# Patient Record
Sex: FEMALE | Race: WHITE | NOT HISPANIC OR LATINO | Employment: UNEMPLOYED | RURAL
[De-identification: names, ages, dates, MRNs, and addresses within clinical notes are randomized per-mention and may not be internally consistent; named-entity substitution may affect disease eponyms.]

---

## 2021-05-07 ENCOUNTER — HOSPITAL ENCOUNTER (EMERGENCY)
Facility: HOSPITAL | Age: 51
Discharge: HOME | End: 2021-05-07
Payer: COMMERCIAL

## 2021-05-07 VITALS
RESPIRATION RATE: 16 BRPM | TEMPERATURE: 98 F | BODY MASS INDEX: 28.52 KG/M2 | HEART RATE: 64 BPM | OXYGEN SATURATION: 97 % | WEIGHT: 155 LBS | HEIGHT: 62 IN | SYSTOLIC BLOOD PRESSURE: 120 MMHG | DIASTOLIC BLOOD PRESSURE: 87 MMHG

## 2021-05-07 DIAGNOSIS — K58.9 IRRITABLE BOWEL SYNDROME, UNSPECIFIED TYPE: Primary | ICD-10-CM

## 2021-05-07 LAB
BILIRUB UR QL STRIP: NEGATIVE
CLARITY UR: CLEAR
COLOR UR: YELLOW
GLUCOSE UR STRIP-MCNC: NEGATIVE MG/DL
KETONES UR STRIP-SCNC: NEGATIVE MG/DL
LEUKOCYTE ESTERASE UR QL STRIP: NEGATIVE
NITRITE UR QL STRIP: NEGATIVE
PH UR STRIP: 6 PH UNITS
PROT UR QL STRIP: NEGATIVE
RBC # UR STRIP: NEGATIVE /UL
SP GR UR STRIP: 1.02
UROBILINOGEN UR STRIP-ACNC: 0.2 MG/DL

## 2021-05-07 PROCEDURE — 99284 PR EMERGENCY DEPT VISIT,LEVEL IV: ICD-10-PCS | Mod: ,,, | Performed by: NURSE PRACTITIONER

## 2021-05-07 PROCEDURE — 99284 EMERGENCY DEPT VISIT MOD MDM: CPT | Mod: ,,, | Performed by: NURSE PRACTITIONER

## 2021-05-07 PROCEDURE — 81003 URINALYSIS AUTO W/O SCOPE: CPT | Performed by: NURSE PRACTITIONER

## 2021-05-07 PROCEDURE — 99284 EMERGENCY DEPT VISIT MOD MDM: CPT | Mod: 25

## 2021-05-07 RX ORDER — DICYCLOMINE HYDROCHLORIDE 20 MG/1
20 TABLET ORAL 2 TIMES DAILY
Qty: 20 TABLET | Refills: 0 | Status: SHIPPED | OUTPATIENT
Start: 2021-05-07 | End: 2021-06-06

## 2021-05-07 RX ORDER — FLUOXETINE HYDROCHLORIDE 40 MG/1
40 CAPSULE ORAL DAILY
COMMUNITY
End: 2021-06-02 | Stop reason: SDUPTHER

## 2021-05-07 RX ORDER — ALPRAZOLAM 1 MG/1
1 TABLET ORAL 3 TIMES DAILY PRN
COMMUNITY
End: 2021-08-05 | Stop reason: SDUPTHER

## 2021-06-02 ENCOUNTER — OFFICE VISIT (OUTPATIENT)
Dept: FAMILY MEDICINE | Facility: CLINIC | Age: 51
End: 2021-06-02
Payer: COMMERCIAL

## 2021-06-02 VITALS
HEIGHT: 62 IN | HEART RATE: 69 BPM | WEIGHT: 158 LBS | SYSTOLIC BLOOD PRESSURE: 132 MMHG | TEMPERATURE: 98 F | BODY MASS INDEX: 29.08 KG/M2 | DIASTOLIC BLOOD PRESSURE: 102 MMHG

## 2021-06-02 DIAGNOSIS — K59.04 CHRONIC IDIOPATHIC CONSTIPATION: Chronic | ICD-10-CM

## 2021-06-02 DIAGNOSIS — R19.5 POSITIVE COLORECTAL CANCER SCREENING USING COLOGUARD TEST: Primary | ICD-10-CM

## 2021-06-02 DIAGNOSIS — F33.9 RECURRENT MAJOR DEPRESSIVE DISORDER, REMISSION STATUS UNSPECIFIED: Chronic | ICD-10-CM

## 2021-06-02 PROCEDURE — 99214 OFFICE O/P EST MOD 30 MIN: CPT | Mod: ,,, | Performed by: FAMILY MEDICINE

## 2021-06-02 PROCEDURE — 99214 PR OFFICE/OUTPT VISIT, EST, LEVL IV, 30-39 MIN: ICD-10-PCS | Mod: ,,, | Performed by: FAMILY MEDICINE

## 2021-06-02 RX ORDER — FLUOXETINE HYDROCHLORIDE 40 MG/1
40 CAPSULE ORAL DAILY
Qty: 90 CAPSULE | Refills: 1 | Status: SHIPPED | OUTPATIENT
Start: 2021-06-02 | End: 2022-02-17 | Stop reason: SDUPTHER

## 2021-06-07 DIAGNOSIS — Z11.59 SPECIAL SCREENING EXAMINATION FOR VIRAL DISEASE: Primary | ICD-10-CM

## 2021-06-07 DIAGNOSIS — R19.5 POSITIVE COLORECTAL CANCER SCREENING USING COLOGUARD TEST: Primary | ICD-10-CM

## 2021-06-25 ENCOUNTER — HOSPITAL ENCOUNTER (OUTPATIENT)
Dept: GASTROENTEROLOGY | Facility: HOSPITAL | Age: 51
Discharge: HOME | End: 2021-06-25
Attending: INTERNAL MEDICINE
Payer: COMMERCIAL

## 2021-06-25 ENCOUNTER — ANESTHESIA EVENT (OUTPATIENT)
Dept: GASTROENTEROLOGY | Facility: HOSPITAL | Age: 51
End: 2021-06-25
Payer: COMMERCIAL

## 2021-06-25 ENCOUNTER — ANESTHESIA (OUTPATIENT)
Dept: GASTROENTEROLOGY | Facility: HOSPITAL | Age: 51
End: 2021-06-25
Payer: COMMERCIAL

## 2021-06-25 VITALS
DIASTOLIC BLOOD PRESSURE: 86 MMHG | BODY MASS INDEX: 27.46 KG/M2 | OXYGEN SATURATION: 98 % | TEMPERATURE: 97 F | RESPIRATION RATE: 19 BRPM | WEIGHT: 155 LBS | HEART RATE: 75 BPM | HEIGHT: 63 IN | SYSTOLIC BLOOD PRESSURE: 115 MMHG

## 2021-06-25 DIAGNOSIS — K63.5 POLYP OF DESCENDING COLON, UNSPECIFIED TYPE: ICD-10-CM

## 2021-06-25 DIAGNOSIS — K63.5 POLYP OF TRANSVERSE COLON, UNSPECIFIED TYPE: ICD-10-CM

## 2021-06-25 DIAGNOSIS — R19.5 POSITIVE COLORECTAL CANCER SCREENING USING COLOGUARD TEST: ICD-10-CM

## 2021-06-25 DIAGNOSIS — K57.30 DIVERTICULA, COLON: ICD-10-CM

## 2021-06-25 DIAGNOSIS — Z83.719 FAMILY HISTORY OF COLONIC POLYPS: ICD-10-CM

## 2021-06-25 PROCEDURE — 88305 TISSUE EXAM BY PATHOLOGIST: CPT | Mod: 26,,, | Performed by: PATHOLOGY

## 2021-06-25 PROCEDURE — D9220A PRA ANESTHESIA: Mod: ,,, | Performed by: NURSE ANESTHETIST, CERTIFIED REGISTERED

## 2021-06-25 PROCEDURE — 25000003 PHARM REV CODE 250: Performed by: NURSE ANESTHETIST, CERTIFIED REGISTERED

## 2021-06-25 PROCEDURE — 63600175 PHARM REV CODE 636 W HCPCS: Performed by: NURSE ANESTHETIST, CERTIFIED REGISTERED

## 2021-06-25 PROCEDURE — C1889 IMPLANT/INSERT DEVICE, NOC: HCPCS

## 2021-06-25 PROCEDURE — 37000009 HC ANESTHESIA EA ADD 15 MINS

## 2021-06-25 PROCEDURE — 27000284 HC CANNULA NASAL: Performed by: NURSE ANESTHETIST, CERTIFIED REGISTERED

## 2021-06-25 PROCEDURE — 37000008 HC ANESTHESIA 1ST 15 MINUTES

## 2021-06-25 PROCEDURE — D9220A PRA ANESTHESIA: ICD-10-PCS | Mod: ,,, | Performed by: NURSE ANESTHETIST, CERTIFIED REGISTERED

## 2021-06-25 PROCEDURE — 27201423 OPTIME MED/SURG SUP & DEVICES STERILE SUPPLY

## 2021-06-25 PROCEDURE — 45380 COLONOSCOPY AND BIOPSY: CPT

## 2021-06-25 PROCEDURE — 88305 SURGICAL PATHOLOGY: ICD-10-PCS | Mod: 26,,, | Performed by: PATHOLOGY

## 2021-06-25 PROCEDURE — 88305 TISSUE EXAM BY PATHOLOGIST: CPT | Mod: SUR | Performed by: INTERNAL MEDICINE

## 2021-06-25 RX ORDER — LIDOCAINE HYDROCHLORIDE 20 MG/ML
INJECTION, SOLUTION EPIDURAL; INFILTRATION; INTRACAUDAL; PERINEURAL
Status: DISCONTINUED | OUTPATIENT
Start: 2021-06-25 | End: 2021-06-25

## 2021-06-25 RX ORDER — GLYCOPYRROLATE 0.2 MG/ML
INJECTION INTRAMUSCULAR; INTRAVENOUS
Status: DISCONTINUED | OUTPATIENT
Start: 2021-06-25 | End: 2021-06-25

## 2021-06-25 RX ORDER — PHENYLEPHRINE HYDROCHLORIDE 10 MG/ML
INJECTION INTRAVENOUS
Status: DISCONTINUED | OUTPATIENT
Start: 2021-06-25 | End: 2021-06-25

## 2021-06-25 RX ORDER — PROPOFOL 10 MG/ML
INJECTION, EMULSION INTRAVENOUS
Status: DISCONTINUED | OUTPATIENT
Start: 2021-06-25 | End: 2021-06-25

## 2021-06-25 RX ORDER — SODIUM CHLORIDE 0.9 % (FLUSH) 0.9 %
10 SYRINGE (ML) INJECTION
Status: DISCONTINUED | OUTPATIENT
Start: 2021-06-25 | End: 2021-06-26 | Stop reason: HOSPADM

## 2021-06-25 RX ADMIN — SODIUM CHLORIDE: 9 INJECTION, SOLUTION INTRAVENOUS at 11:06

## 2021-06-25 RX ADMIN — GLYCOPYRROLATE 0.2 MG: 0.2 INJECTION INTRAMUSCULAR; INTRAVENOUS at 12:06

## 2021-06-25 RX ADMIN — PHENYLEPHRINE HYDROCHLORIDE 100 MCG: 10 INJECTION INTRAVENOUS at 12:06

## 2021-06-25 RX ADMIN — PROPOFOL 50 MG: 10 INJECTION, EMULSION INTRAVENOUS at 11:06

## 2021-06-25 RX ADMIN — LIDOCAINE HYDROCHLORIDE 100 MG: 20 INJECTION, SOLUTION INTRAVENOUS at 11:06

## 2021-06-28 LAB
ESTROGEN SERPL-MCNC: NORMAL PG/ML
LAB AP GROSS DESCRIPTION: NORMAL
LAB AP LABORATORY NOTES: NORMAL
T3RU NFR SERPL: NORMAL %

## 2021-06-29 ENCOUNTER — TELEPHONE (OUTPATIENT)
Dept: GASTROENTEROLOGY | Facility: CLINIC | Age: 51
End: 2021-06-29

## 2021-08-05 ENCOUNTER — OFFICE VISIT (OUTPATIENT)
Dept: FAMILY MEDICINE | Facility: CLINIC | Age: 51
End: 2021-08-05
Payer: COMMERCIAL

## 2021-08-05 VITALS
BODY MASS INDEX: 27.46 KG/M2 | DIASTOLIC BLOOD PRESSURE: 74 MMHG | SYSTOLIC BLOOD PRESSURE: 97 MMHG | WEIGHT: 155 LBS | HEART RATE: 74 BPM | HEIGHT: 63 IN

## 2021-08-05 DIAGNOSIS — Z76.0 ISSUE OF REPEAT PRESCRIPTION: ICD-10-CM

## 2021-08-05 DIAGNOSIS — Z28.21 COVID-19 VIRUS VACCINATION DECLINED: ICD-10-CM

## 2021-08-05 DIAGNOSIS — F41.1 GAD (GENERALIZED ANXIETY DISORDER): Primary | Chronic | ICD-10-CM

## 2021-08-05 DIAGNOSIS — F33.41 RECURRENT MAJOR DEPRESSIVE DISORDER, IN PARTIAL REMISSION: Chronic | ICD-10-CM

## 2021-08-05 PROCEDURE — 99214 OFFICE O/P EST MOD 30 MIN: CPT | Mod: ,,, | Performed by: FAMILY MEDICINE

## 2021-08-05 PROCEDURE — 99214 PR OFFICE/OUTPT VISIT, EST, LEVL IV, 30-39 MIN: ICD-10-PCS | Mod: ,,, | Performed by: FAMILY MEDICINE

## 2021-08-05 RX ORDER — ALPRAZOLAM 1 MG/1
1 TABLET ORAL 3 TIMES DAILY PRN
Qty: 90 TABLET | Refills: 2 | Status: SHIPPED | OUTPATIENT
Start: 2021-08-05 | End: 2021-10-05

## 2021-08-18 ENCOUNTER — OFFICE VISIT (OUTPATIENT)
Dept: FAMILY MEDICINE | Facility: CLINIC | Age: 51
End: 2021-08-18
Payer: COMMERCIAL

## 2021-08-18 DIAGNOSIS — Z20.822 CLOSE EXPOSURE TO COVID-19 VIRUS: ICD-10-CM

## 2021-08-18 DIAGNOSIS — R05.9 COUGH: ICD-10-CM

## 2021-08-18 DIAGNOSIS — U07.1 COVID-19: Primary | ICD-10-CM

## 2021-08-18 DIAGNOSIS — R53.83 FATIGUE, UNSPECIFIED TYPE: ICD-10-CM

## 2021-08-18 LAB
CTP QC/QA: YES
FLUAV AG NPH QL: NEGATIVE
FLUBV AG NPH QL: NEGATIVE
SARS-COV-2 AG RESP QL IA.RAPID: POSITIVE

## 2021-08-18 PROCEDURE — 99213 PR OFFICE/OUTPT VISIT, EST, LEVL III, 20-29 MIN: ICD-10-PCS | Mod: ,,, | Performed by: FAMILY MEDICINE

## 2021-08-18 PROCEDURE — 99213 OFFICE O/P EST LOW 20 MIN: CPT | Mod: ,,, | Performed by: FAMILY MEDICINE

## 2021-08-18 PROCEDURE — 87428 POCT SARS-COV2 (COVID) WITH FLU ANTIGEN: ICD-10-PCS | Mod: QW,,, | Performed by: FAMILY MEDICINE

## 2021-08-18 PROCEDURE — 87428 SARSCOV & INF VIR A&B AG IA: CPT | Mod: QW,,, | Performed by: FAMILY MEDICINE

## 2021-08-26 ENCOUNTER — HOSPITAL ENCOUNTER (INPATIENT)
Facility: HOSPITAL | Age: 51
LOS: 7 days | Discharge: HOME | DRG: 177 | End: 2021-09-03
Attending: EMERGENCY MEDICINE | Admitting: EMERGENCY MEDICINE
Payer: COMMERCIAL

## 2021-08-26 DIAGNOSIS — U07.1 COVID-19 VIRUS INFECTION: ICD-10-CM

## 2021-08-26 DIAGNOSIS — R09.02 HYPOXIA: ICD-10-CM

## 2021-08-26 DIAGNOSIS — Z20.822 SUSPECTED COVID-19 VIRUS INFECTION: Primary | ICD-10-CM

## 2021-08-26 DIAGNOSIS — R11.2 NAUSEA AND VOMITING, INTRACTABILITY OF VOMITING NOT SPECIFIED, UNSPECIFIED VOMITING TYPE: ICD-10-CM

## 2021-08-26 LAB
ALBUMIN SERPL BCP-MCNC: 3.9 G/DL (ref 3.5–5)
ALBUMIN/GLOB SERPL: 1.1 {RATIO}
ALP SERPL-CCNC: 98 U/L (ref 41–108)
ALT SERPL W P-5'-P-CCNC: 141 U/L (ref 13–56)
ANION GAP SERPL CALCULATED.3IONS-SCNC: 12 MMOL/L (ref 7–16)
AST SERPL W P-5'-P-CCNC: 151 U/L (ref 15–37)
BACTERIA #/AREA URNS HPF: ABNORMAL /HPF
BASOPHILS # BLD AUTO: 0 K/UL (ref 0–0.2)
BASOPHILS NFR BLD AUTO: 0 % (ref 0–1)
BILIRUB SERPL-MCNC: 0.3 MG/DL (ref 0–1.2)
BILIRUB UR QL STRIP: NEGATIVE
BUN SERPL-MCNC: 12 MG/DL (ref 7–18)
BUN/CREAT SERPL: 13 (ref 6–20)
CALCIUM SERPL-MCNC: 8.4 MG/DL (ref 8.5–10.1)
CHLORIDE SERPL-SCNC: 101 MMOL/L (ref 98–107)
CLARITY UR: CLEAR
CO2 SERPL-SCNC: 27 MMOL/L (ref 21–32)
COLOR UR: YELLOW
CREAT SERPL-MCNC: 0.96 MG/DL (ref 0.55–1.02)
D DIMER PPP FEU-MCNC: 0.64 ΜG/ML (ref 0–0.47)
DIFFERENTIAL METHOD BLD: ABNORMAL
EOSINOPHIL # BLD AUTO: 0.01 K/UL (ref 0–0.5)
EOSINOPHIL NFR BLD AUTO: 0.2 % (ref 1–4)
ERYTHROCYTE [DISTWIDTH] IN BLOOD BY AUTOMATED COUNT: 14.3 % (ref 11.5–14.5)
GLOBULIN SER-MCNC: 3.6 G/DL (ref 2–4)
GLUCOSE SERPL-MCNC: 108 MG/DL (ref 74–106)
GLUCOSE UR STRIP-MCNC: NEGATIVE MG/DL
HCT VFR BLD AUTO: 46.2 % (ref 38–47)
HGB BLD-MCNC: 15.5 G/DL (ref 12–16)
KETONES UR STRIP-SCNC: ABNORMAL MG/DL
LACTATE SERPL-SCNC: 0.8 MMOL/L (ref 0.4–2)
LEUKOCYTE ESTERASE UR QL STRIP: NEGATIVE
LYMPHOCYTES # BLD AUTO: 1 K/UL (ref 1–4.8)
LYMPHOCYTES NFR BLD AUTO: 24.6 % (ref 27–41)
MCH RBC QN AUTO: 29.9 PG (ref 27–31)
MCHC RBC AUTO-ENTMCNC: 33.5 G/DL (ref 32–36)
MCV RBC AUTO: 89 FL (ref 80–96)
MONOCYTES # BLD AUTO: 0.35 K/UL (ref 0–0.8)
MONOCYTES NFR BLD AUTO: 8.6 % (ref 2–6)
MPC BLD CALC-MCNC: 10.6 FL (ref 9.4–12.4)
MUCOUS THREADS #/AREA URNS HPF: ABNORMAL /HPF
NEUTROPHILS # BLD AUTO: 2.71 K/UL (ref 1.8–7.7)
NEUTROPHILS NFR BLD AUTO: 66.6 % (ref 53–65)
NITRITE UR QL STRIP: NEGATIVE
PH UR STRIP: 5.5 PH UNITS
PLATELET # BLD AUTO: 158 K/UL (ref 150–400)
POTASSIUM SERPL-SCNC: 3.5 MMOL/L (ref 3.5–5.1)
PROT SERPL-MCNC: 7.5 G/DL (ref 6.4–8.2)
PROT UR QL STRIP: 30
RBC # BLD AUTO: 5.19 M/UL (ref 4.2–5.4)
RBC # UR STRIP: ABNORMAL /UL
RBC #/AREA URNS HPF: ABNORMAL /HPF
SODIUM SERPL-SCNC: 136 MMOL/L (ref 136–145)
SP GR UR STRIP: 1.02
SQUAMOUS #/AREA URNS LPF: ABNORMAL /LPF
TROPONIN I SERPL-MCNC: <0.017 NG/ML
UROBILINOGEN UR STRIP-ACNC: 0.2 MG/DL
WBC # BLD AUTO: 4.07 K/UL (ref 4.5–11)
WBC #/AREA URNS HPF: ABNORMAL /HPF

## 2021-08-26 PROCEDURE — 96365 THER/PROPH/DIAG IV INF INIT: CPT

## 2021-08-26 PROCEDURE — 80053 COMPREHEN METABOLIC PANEL: CPT | Performed by: NURSE PRACTITIONER

## 2021-08-26 PROCEDURE — 81001 URINALYSIS AUTO W/SCOPE: CPT | Performed by: NURSE PRACTITIONER

## 2021-08-26 PROCEDURE — 96361 HYDRATE IV INFUSION ADD-ON: CPT

## 2021-08-26 PROCEDURE — 63600175 PHARM REV CODE 636 W HCPCS: Performed by: NURSE PRACTITIONER

## 2021-08-26 PROCEDURE — 87040 BLOOD CULTURE FOR BACTERIA: CPT | Performed by: NURSE PRACTITIONER

## 2021-08-26 PROCEDURE — 36415 COLL VENOUS BLD VENIPUNCTURE: CPT | Performed by: NURSE PRACTITIONER

## 2021-08-26 PROCEDURE — 85378 FIBRIN DEGRADE SEMIQUANT: CPT | Performed by: NURSE PRACTITIONER

## 2021-08-26 PROCEDURE — 25500020 PHARM REV CODE 255: Performed by: NURSE PRACTITIONER

## 2021-08-26 PROCEDURE — 99285 EMERGENCY DEPT VISIT HI MDM: CPT | Mod: 25

## 2021-08-26 PROCEDURE — 99284 PR EMERGENCY DEPT VISIT,LEVEL IV: ICD-10-PCS | Mod: ,,, | Performed by: NURSE PRACTITIONER

## 2021-08-26 PROCEDURE — 93005 ELECTROCARDIOGRAM TRACING: CPT

## 2021-08-26 PROCEDURE — 93010 ELECTROCARDIOGRAM REPORT: CPT | Mod: ,,, | Performed by: HOSPITALIST

## 2021-08-26 PROCEDURE — 25000003 PHARM REV CODE 250: Performed by: NURSE PRACTITIONER

## 2021-08-26 PROCEDURE — 99284 EMERGENCY DEPT VISIT MOD MDM: CPT | Mod: ,,, | Performed by: NURSE PRACTITIONER

## 2021-08-26 PROCEDURE — 96375 TX/PRO/DX INJ NEW DRUG ADDON: CPT

## 2021-08-26 PROCEDURE — 85025 COMPLETE CBC W/AUTO DIFF WBC: CPT | Performed by: NURSE PRACTITIONER

## 2021-08-26 PROCEDURE — 93010 EKG 12-LEAD: ICD-10-PCS | Mod: ,,, | Performed by: HOSPITALIST

## 2021-08-26 PROCEDURE — 84484 ASSAY OF TROPONIN QUANT: CPT | Performed by: NURSE PRACTITIONER

## 2021-08-26 PROCEDURE — 83605 ASSAY OF LACTIC ACID: CPT | Performed by: NURSE PRACTITIONER

## 2021-08-26 PROCEDURE — 81003 URINALYSIS AUTO W/O SCOPE: CPT | Performed by: NURSE PRACTITIONER

## 2021-08-26 RX ORDER — DIPHENHYDRAMINE HYDROCHLORIDE 50 MG/ML
50 INJECTION INTRAMUSCULAR; INTRAVENOUS
Status: COMPLETED | OUTPATIENT
Start: 2021-08-26 | End: 2021-08-26

## 2021-08-26 RX ORDER — ONDANSETRON 2 MG/ML
4 INJECTION INTRAMUSCULAR; INTRAVENOUS
Status: COMPLETED | OUTPATIENT
Start: 2021-08-26 | End: 2021-08-26

## 2021-08-26 RX ORDER — FAMOTIDINE 10 MG/ML
20 INJECTION INTRAVENOUS
Status: COMPLETED | OUTPATIENT
Start: 2021-08-26 | End: 2021-08-26

## 2021-08-26 RX ORDER — DEXAMETHASONE SODIUM PHOSPHATE 4 MG/ML
8 INJECTION, SOLUTION INTRA-ARTICULAR; INTRALESIONAL; INTRAMUSCULAR; INTRAVENOUS; SOFT TISSUE
Status: COMPLETED | OUTPATIENT
Start: 2021-08-26 | End: 2021-08-26

## 2021-08-26 RX ADMIN — SODIUM CHLORIDE 1000 ML: 9 INJECTION, SOLUTION INTRAVENOUS at 09:08

## 2021-08-26 RX ADMIN — IOPAMIDOL 100 ML: 755 INJECTION, SOLUTION INTRAVENOUS at 11:08

## 2021-08-26 RX ADMIN — ONDANSETRON 4 MG: 2 INJECTION INTRAMUSCULAR; INTRAVENOUS at 09:08

## 2021-08-26 RX ADMIN — CEFTRIAXONE 1 G: 1 INJECTION, POWDER, FOR SOLUTION INTRAMUSCULAR; INTRAVENOUS at 10:08

## 2021-08-26 RX ADMIN — FAMOTIDINE 20 MG: 10 INJECTION, SOLUTION INTRAVENOUS at 10:08

## 2021-08-26 RX ADMIN — DEXAMETHASONE SODIUM PHOSPHATE 8 MG: 4 INJECTION, SOLUTION INTRAMUSCULAR; INTRAVENOUS at 10:08

## 2021-08-26 RX ADMIN — DIPHENHYDRAMINE HYDROCHLORIDE 50 MG: 50 INJECTION, SOLUTION INTRAMUSCULAR; INTRAVENOUS at 10:08

## 2021-08-27 PROBLEM — F17.200 CURRENT EVERY DAY SMOKER: Status: ACTIVE | Noted: 2021-08-27

## 2021-08-27 PROBLEM — F41.9 ANXIETY: Status: ACTIVE | Noted: 2021-08-27

## 2021-08-27 PROBLEM — Z79.899 DVT PROPHYLAXIS: Status: ACTIVE | Noted: 2021-08-27

## 2021-08-27 PROBLEM — U07.1 COVID-19 VIRUS INFECTION: Status: ACTIVE | Noted: 2021-08-27

## 2021-08-27 LAB
ALBUMIN SERPL BCP-MCNC: 3.3 G/DL (ref 3.5–5)
ALBUMIN/GLOB SERPL: 1 {RATIO}
ALP SERPL-CCNC: 87 U/L (ref 41–108)
ALT SERPL W P-5'-P-CCNC: 120 U/L (ref 13–56)
ANION GAP SERPL CALCULATED.3IONS-SCNC: 9 MMOL/L (ref 7–16)
AST SERPL W P-5'-P-CCNC: 118 U/L (ref 15–37)
BASOPHILS # BLD AUTO: 0 K/UL (ref 0–0.2)
BASOPHILS NFR BLD AUTO: 0 % (ref 0–1)
BILIRUB SERPL-MCNC: 0.2 MG/DL (ref 0–1.2)
BUN SERPL-MCNC: 9 MG/DL (ref 7–18)
BUN/CREAT SERPL: 10 (ref 6–20)
CALCIUM SERPL-MCNC: 8.1 MG/DL (ref 8.5–10.1)
CHLORIDE SERPL-SCNC: 103 MMOL/L (ref 98–107)
CO2 SERPL-SCNC: 29 MMOL/L (ref 21–32)
CREAT SERPL-MCNC: 0.86 MG/DL (ref 0.55–1.02)
DIFFERENTIAL METHOD BLD: ABNORMAL
EOSINOPHIL # BLD AUTO: 0 K/UL (ref 0–0.5)
EOSINOPHIL NFR BLD AUTO: 0 % (ref 1–4)
ERYTHROCYTE [DISTWIDTH] IN BLOOD BY AUTOMATED COUNT: 14.3 % (ref 11.5–14.5)
FLUAV AG UPPER RESP QL IA.RAPID: NEGATIVE
FLUBV AG UPPER RESP QL IA.RAPID: NEGATIVE
GLOBULIN SER-MCNC: 3.4 G/DL (ref 2–4)
GLUCOSE SERPL-MCNC: 189 MG/DL (ref 74–106)
HCT VFR BLD AUTO: 43.1 % (ref 38–47)
HGB BLD-MCNC: 14.4 G/DL (ref 12–16)
LYMPHOCYTES # BLD AUTO: 0.46 K/UL (ref 1–4.8)
LYMPHOCYTES NFR BLD AUTO: 21.4 % (ref 27–41)
LYMPHOCYTES NFR BLD MANUAL: 20 % (ref 27–41)
MCH RBC QN AUTO: 30.2 PG (ref 27–31)
MCHC RBC AUTO-ENTMCNC: 33.4 G/DL (ref 32–36)
MCV RBC AUTO: 90.4 FL (ref 80–96)
MONOCYTES # BLD AUTO: 0.1 K/UL (ref 0–0.8)
MONOCYTES NFR BLD AUTO: 4.7 % (ref 2–6)
MONOCYTES NFR BLD MANUAL: 5 % (ref 2–6)
MPC BLD CALC-MCNC: 10.6 FL (ref 9.4–12.4)
NEUTROPHILS # BLD AUTO: 1.59 K/UL (ref 1.8–7.7)
NEUTROPHILS NFR BLD AUTO: 73.9 % (ref 53–65)
NEUTS BAND NFR BLD MANUAL: 2 % (ref 1–5)
NEUTS SEG NFR BLD MANUAL: 73 % (ref 50–62)
NRBC BLD MANUAL-RTO: ABNORMAL %
PLATELET # BLD AUTO: 143 K/UL (ref 150–400)
PLATELET MORPHOLOGY: ABNORMAL
POTASSIUM SERPL-SCNC: 3.9 MMOL/L (ref 3.5–5.1)
PROT SERPL-MCNC: 6.7 G/DL (ref 6.4–8.2)
RBC # BLD AUTO: 4.77 M/UL (ref 4.2–5.4)
RBC MORPH BLD: NORMAL
SARS-COV+SARS-COV-2 AG RESP QL IA.RAPID: POSITIVE
SODIUM SERPL-SCNC: 137 MMOL/L (ref 136–145)
WBC # BLD AUTO: 2.15 K/UL (ref 4.5–11)

## 2021-08-27 PROCEDURE — 99222 1ST HOSP IP/OBS MODERATE 55: CPT | Mod: ,,, | Performed by: NURSE PRACTITIONER

## 2021-08-27 PROCEDURE — 99900035 HC TECH TIME PER 15 MIN (STAT)

## 2021-08-27 PROCEDURE — 87506 IADNA-DNA/RNA PROBE TQ 6-11: CPT | Performed by: EMERGENCY MEDICINE

## 2021-08-27 PROCEDURE — 27000221 HC OXYGEN, UP TO 24 HOURS

## 2021-08-27 PROCEDURE — 87428 SARSCOV & INF VIR A&B AG IA: CPT | Performed by: NURSE PRACTITIONER

## 2021-08-27 PROCEDURE — 94761 N-INVAS EAR/PLS OXIMETRY MLT: CPT

## 2021-08-27 PROCEDURE — 63600175 PHARM REV CODE 636 W HCPCS: Performed by: NURSE PRACTITIONER

## 2021-08-27 PROCEDURE — 80053 COMPREHEN METABOLIC PANEL: CPT | Performed by: NURSE PRACTITIONER

## 2021-08-27 PROCEDURE — 99222 PR INITIAL HOSPITAL CARE,LEVL II: ICD-10-PCS | Mod: ,,, | Performed by: NURSE PRACTITIONER

## 2021-08-27 PROCEDURE — 87040 BLOOD CULTURE FOR BACTERIA: CPT | Performed by: NURSE PRACTITIONER

## 2021-08-27 PROCEDURE — 25000003 PHARM REV CODE 250: Performed by: EMERGENCY MEDICINE

## 2021-08-27 PROCEDURE — 87493 C DIFF AMPLIFIED PROBE: CPT | Performed by: EMERGENCY MEDICINE

## 2021-08-27 PROCEDURE — 25000003 PHARM REV CODE 250: Performed by: NURSE PRACTITIONER

## 2021-08-27 PROCEDURE — 94640 AIRWAY INHALATION TREATMENT: CPT

## 2021-08-27 PROCEDURE — 36415 COLL VENOUS BLD VENIPUNCTURE: CPT | Performed by: NURSE PRACTITIONER

## 2021-08-27 PROCEDURE — 85025 COMPLETE CBC W/AUTO DIFF WBC: CPT | Performed by: NURSE PRACTITIONER

## 2021-08-27 PROCEDURE — 25000242 PHARM REV CODE 250 ALT 637 W/ HCPCS: Performed by: NURSE PRACTITIONER

## 2021-08-27 PROCEDURE — 11000001 HC ACUTE MED/SURG PRIVATE ROOM

## 2021-08-27 PROCEDURE — 27100098 HC SPACER

## 2021-08-27 RX ORDER — ALBUTEROL SULFATE 90 UG/1
2 AEROSOL, METERED RESPIRATORY (INHALATION) EVERY 6 HOURS
Status: DISCONTINUED | OUTPATIENT
Start: 2021-08-27 | End: 2021-09-03 | Stop reason: HOSPADM

## 2021-08-27 RX ORDER — ONDANSETRON 2 MG/ML
4 INJECTION INTRAMUSCULAR; INTRAVENOUS EVERY 8 HOURS PRN
Status: DISCONTINUED | OUTPATIENT
Start: 2021-08-27 | End: 2021-09-03 | Stop reason: HOSPADM

## 2021-08-27 RX ORDER — FLUOXETINE 10 MG/1
40 CAPSULE ORAL DAILY
Status: DISCONTINUED | OUTPATIENT
Start: 2021-08-27 | End: 2021-09-03 | Stop reason: HOSPADM

## 2021-08-27 RX ORDER — ACETAMINOPHEN 325 MG/1
650 TABLET ORAL EVERY 6 HOURS PRN
Status: DISCONTINUED | OUTPATIENT
Start: 2021-08-27 | End: 2021-09-03 | Stop reason: HOSPADM

## 2021-08-27 RX ORDER — ASCORBIC ACID 500 MG
1000 TABLET ORAL DAILY
Status: DISCONTINUED | OUTPATIENT
Start: 2021-08-27 | End: 2021-09-03 | Stop reason: HOSPADM

## 2021-08-27 RX ORDER — POLYETHYLENE GLYCOL 3350 17 G/17G
17 POWDER, FOR SOLUTION ORAL DAILY PRN
Status: DISCONTINUED | OUTPATIENT
Start: 2021-08-27 | End: 2021-09-03 | Stop reason: HOSPADM

## 2021-08-27 RX ORDER — ZINC SULFATE 50(220)MG
220 CAPSULE ORAL DAILY
Status: DISCONTINUED | OUTPATIENT
Start: 2021-08-27 | End: 2021-09-03 | Stop reason: HOSPADM

## 2021-08-27 RX ORDER — TALC
6 POWDER (GRAM) TOPICAL NIGHTLY PRN
Status: DISCONTINUED | OUTPATIENT
Start: 2021-08-27 | End: 2021-09-03 | Stop reason: HOSPADM

## 2021-08-27 RX ORDER — CHOLECALCIFEROL (VITAMIN D3) 25 MCG
1000 TABLET ORAL DAILY
Status: DISCONTINUED | OUTPATIENT
Start: 2021-08-27 | End: 2021-09-03 | Stop reason: HOSPADM

## 2021-08-27 RX ORDER — SODIUM CHLORIDE 0.9 % (FLUSH) 0.9 %
10 SYRINGE (ML) INJECTION
Status: DISCONTINUED | OUTPATIENT
Start: 2021-08-27 | End: 2021-09-03 | Stop reason: HOSPADM

## 2021-08-27 RX ORDER — SODIUM CHLORIDE 9 MG/ML
INJECTION, SOLUTION INTRAVENOUS CONTINUOUS
Status: DISCONTINUED | OUTPATIENT
Start: 2021-08-27 | End: 2021-09-03 | Stop reason: HOSPADM

## 2021-08-27 RX ORDER — ALPRAZOLAM 0.25 MG/1
1 TABLET ORAL 3 TIMES DAILY PRN
Status: DISCONTINUED | OUTPATIENT
Start: 2021-08-27 | End: 2021-09-03 | Stop reason: HOSPADM

## 2021-08-27 RX ORDER — ALBUTEROL SULFATE 90 UG/1
2 AEROSOL, METERED RESPIRATORY (INHALATION) EVERY 6 HOURS
Status: DISCONTINUED | OUTPATIENT
Start: 2021-08-27 | End: 2021-08-27

## 2021-08-27 RX ORDER — FAMOTIDINE 20 MG/1
20 TABLET, FILM COATED ORAL 2 TIMES DAILY
Status: DISCONTINUED | OUTPATIENT
Start: 2021-08-27 | End: 2021-09-03 | Stop reason: HOSPADM

## 2021-08-27 RX ORDER — DEXAMETHASONE SODIUM PHOSPHATE 4 MG/ML
6 INJECTION, SOLUTION INTRA-ARTICULAR; INTRALESIONAL; INTRAMUSCULAR; INTRAVENOUS; SOFT TISSUE EVERY 24 HOURS
Status: DISCONTINUED | OUTPATIENT
Start: 2021-08-27 | End: 2021-09-03 | Stop reason: HOSPADM

## 2021-08-27 RX ADMIN — FAMOTIDINE 20 MG: 20 TABLET ORAL at 01:08

## 2021-08-27 RX ADMIN — FAMOTIDINE 20 MG: 20 TABLET ORAL at 08:08

## 2021-08-27 RX ADMIN — ALBUTEROL SULFATE 2 PUFF: 108 INHALANT RESPIRATORY (INHALATION) at 07:08

## 2021-08-27 RX ADMIN — ZINC SULFATE 220 MG (50 MG) CAPSULE 220 MG: CAPSULE at 08:08

## 2021-08-27 RX ADMIN — SODIUM CHLORIDE: 9 INJECTION, SOLUTION INTRAVENOUS at 01:08

## 2021-08-27 RX ADMIN — ACETAMINOPHEN 650 MG: 325 TABLET ORAL at 08:08

## 2021-08-27 RX ADMIN — SODIUM CHLORIDE: 9 INJECTION, SOLUTION INTRAVENOUS at 08:08

## 2021-08-27 RX ADMIN — APIXABAN 5 MG: 2.5 TABLET, FILM COATED ORAL at 08:08

## 2021-08-27 RX ADMIN — ALBUTEROL SULFATE 2 PUFF: 90 AEROSOL, METERED RESPIRATORY (INHALATION) at 06:08

## 2021-08-27 RX ADMIN — ALBUTEROL SULFATE 2 PUFF: 108 INHALANT RESPIRATORY (INHALATION) at 01:08

## 2021-08-27 RX ADMIN — Medication 1000 UNITS: at 08:08

## 2021-08-27 RX ADMIN — AZITHROMYCIN MONOHYDRATE 500 MG: 500 INJECTION, POWDER, LYOPHILIZED, FOR SOLUTION INTRAVENOUS at 01:08

## 2021-08-27 RX ADMIN — CEFTRIAXONE 1 G: 1 INJECTION, POWDER, FOR SOLUTION INTRAMUSCULAR; INTRAVENOUS at 09:08

## 2021-08-27 RX ADMIN — FLUOXETINE 40 MG: 10 CAPSULE ORAL at 08:08

## 2021-08-27 RX ADMIN — REMDESIVIR 200 MG: 100 INJECTION, POWDER, LYOPHILIZED, FOR SOLUTION INTRAVENOUS at 02:08

## 2021-08-27 RX ADMIN — DEXAMETHASONE SODIUM PHOSPHATE 6 MG: 4 INJECTION, SOLUTION INTRAMUSCULAR; INTRAVENOUS at 08:08

## 2021-08-27 RX ADMIN — ACETAMINOPHEN 650 MG: 325 TABLET ORAL at 09:08

## 2021-08-27 RX ADMIN — OXYCODONE HYDROCHLORIDE AND ACETAMINOPHEN 1000 MG: 500 TABLET ORAL at 08:08

## 2021-08-28 LAB
ALBUMIN SERPL BCP-MCNC: 3.6 G/DL (ref 3.5–5)
ALBUMIN/GLOB SERPL: 1.1 {RATIO}
ALP SERPL-CCNC: 86 U/L (ref 41–108)
ALT SERPL W P-5'-P-CCNC: 93 U/L (ref 13–56)
ANION GAP SERPL CALCULATED.3IONS-SCNC: 10 MMOL/L (ref 7–16)
AST SERPL W P-5'-P-CCNC: 66 U/L (ref 15–37)
BASOPHILS # BLD AUTO: 0 K/UL (ref 0–0.2)
BASOPHILS NFR BLD AUTO: 0 % (ref 0–1)
BILIRUB SERPL-MCNC: 0.2 MG/DL (ref 0–1.2)
BUN SERPL-MCNC: 8 MG/DL (ref 7–18)
BUN/CREAT SERPL: 9 (ref 6–20)
C COLI+JEJ+UPSA DNA STL QL NAA+NON-PROBE: NEGATIVE
C DIFF TOX A+B STL IA-ACNC: NEGATIVE
CALCIUM SERPL-MCNC: 8.3 MG/DL (ref 8.5–10.1)
CHLORIDE SERPL-SCNC: 107 MMOL/L (ref 98–107)
CO2 SERPL-SCNC: 30 MMOL/L (ref 21–32)
CREAT SERPL-MCNC: 0.91 MG/DL (ref 0.55–1.02)
DIFFERENTIAL METHOD BLD: ABNORMAL
E COLI SXT1 STL QL IA: NEGATIVE
E COLI SXT2 STL QL IA: NEGATIVE
EOSINOPHIL # BLD AUTO: 0 K/UL (ref 0–0.5)
EOSINOPHIL NFR BLD AUTO: 0 % (ref 1–4)
ERYTHROCYTE [DISTWIDTH] IN BLOOD BY AUTOMATED COUNT: 14.5 % (ref 11.5–14.5)
GLOBULIN SER-MCNC: 3.3 G/DL (ref 2–4)
GLUCOSE SERPL-MCNC: 99 MG/DL (ref 74–106)
HCT VFR BLD AUTO: 44.3 % (ref 38–47)
HGB BLD-MCNC: 14.7 G/DL (ref 12–16)
LYMPHOCYTES # BLD AUTO: 1.17 K/UL (ref 1–4.8)
LYMPHOCYTES NFR BLD AUTO: 15.9 % (ref 27–41)
MCH RBC QN AUTO: 29.9 PG (ref 27–31)
MCHC RBC AUTO-ENTMCNC: 33.2 G/DL (ref 32–36)
MCV RBC AUTO: 90.2 FL (ref 80–96)
MONOCYTES # BLD AUTO: 0.55 K/UL (ref 0–0.8)
MONOCYTES NFR BLD AUTO: 7.5 % (ref 2–6)
MPC BLD CALC-MCNC: 10.3 FL (ref 9.4–12.4)
NEUTROPHILS # BLD AUTO: 5.63 K/UL (ref 1.8–7.7)
NEUTROPHILS NFR BLD AUTO: 76.6 % (ref 53–65)
NOROVIRUS GI+II RNA STL QL NAA+NON-PROBE: NEGATIVE
PLATELET # BLD AUTO: 161 K/UL (ref 150–400)
POTASSIUM SERPL-SCNC: 3.3 MMOL/L (ref 3.5–5.1)
PROT SERPL-MCNC: 6.9 G/DL (ref 6.4–8.2)
RBC # BLD AUTO: 4.91 M/UL (ref 4.2–5.4)
RVA RNA STL QL NAA+NON-PROBE: NEGATIVE
S ENT+BONG DNA STL QL NAA+NON-PROBE: NEGATIVE
SHIGELLA SPECIES NAT: NEGATIVE
SODIUM SERPL-SCNC: 144 MMOL/L (ref 136–145)
V CHOL+PARA+VUL DNA STL QL NAA+NON-PROBE: NEGATIVE
WBC # BLD AUTO: 7.35 K/UL (ref 4.5–11)
Y ENTEROCOL DNA STL QL NAA+NON-PROBE: NEGATIVE

## 2021-08-28 PROCEDURE — 27000221 HC OXYGEN, UP TO 24 HOURS

## 2021-08-28 PROCEDURE — 99233 SBSQ HOSP IP/OBS HIGH 50: CPT | Mod: 95,,, | Performed by: EMERGENCY MEDICINE

## 2021-08-28 PROCEDURE — 63600175 PHARM REV CODE 636 W HCPCS: Performed by: NURSE PRACTITIONER

## 2021-08-28 PROCEDURE — 25000003 PHARM REV CODE 250: Performed by: EMERGENCY MEDICINE

## 2021-08-28 PROCEDURE — 99233 PR SUBSEQUENT HOSPITAL CARE,LEVL III: ICD-10-PCS | Mod: 95,,, | Performed by: EMERGENCY MEDICINE

## 2021-08-28 PROCEDURE — 11000001 HC ACUTE MED/SURG PRIVATE ROOM

## 2021-08-28 PROCEDURE — 36415 COLL VENOUS BLD VENIPUNCTURE: CPT | Performed by: EMERGENCY MEDICINE

## 2021-08-28 PROCEDURE — 80053 COMPREHEN METABOLIC PANEL: CPT | Performed by: EMERGENCY MEDICINE

## 2021-08-28 PROCEDURE — 27000944

## 2021-08-28 PROCEDURE — 94640 AIRWAY INHALATION TREATMENT: CPT

## 2021-08-28 PROCEDURE — 85025 COMPLETE CBC W/AUTO DIFF WBC: CPT | Performed by: NURSE PRACTITIONER

## 2021-08-28 PROCEDURE — 99900035 HC TECH TIME PER 15 MIN (STAT)

## 2021-08-28 PROCEDURE — 63600175 PHARM REV CODE 636 W HCPCS: Performed by: EMERGENCY MEDICINE

## 2021-08-28 PROCEDURE — 94761 N-INVAS EAR/PLS OXIMETRY MLT: CPT

## 2021-08-28 PROCEDURE — 25000003 PHARM REV CODE 250: Performed by: NURSE PRACTITIONER

## 2021-08-28 RX ADMIN — REMDESIVIR 100 MG: 100 INJECTION, POWDER, LYOPHILIZED, FOR SOLUTION INTRAVENOUS at 08:08

## 2021-08-28 RX ADMIN — APIXABAN 5 MG: 2.5 TABLET, FILM COATED ORAL at 08:08

## 2021-08-28 RX ADMIN — OXYCODONE HYDROCHLORIDE AND ACETAMINOPHEN 1000 MG: 500 TABLET ORAL at 08:08

## 2021-08-28 RX ADMIN — ALBUTEROL SULFATE 2 PUFF: 90 AEROSOL, METERED RESPIRATORY (INHALATION) at 12:08

## 2021-08-28 RX ADMIN — ZINC SULFATE 220 MG (50 MG) CAPSULE 220 MG: CAPSULE at 08:08

## 2021-08-28 RX ADMIN — FAMOTIDINE 20 MG: 20 TABLET ORAL at 08:08

## 2021-08-28 RX ADMIN — ALBUTEROL SULFATE 2 PUFF: 90 AEROSOL, METERED RESPIRATORY (INHALATION) at 07:08

## 2021-08-28 RX ADMIN — CEFTRIAXONE 1 G: 1 INJECTION, POWDER, FOR SOLUTION INTRAMUSCULAR; INTRAVENOUS at 09:08

## 2021-08-28 RX ADMIN — AZITHROMYCIN MONOHYDRATE 500 MG: 500 INJECTION, POWDER, LYOPHILIZED, FOR SOLUTION INTRAVENOUS at 12:08

## 2021-08-28 RX ADMIN — ACETAMINOPHEN 650 MG: 325 TABLET ORAL at 12:08

## 2021-08-28 RX ADMIN — FLUOXETINE 40 MG: 10 CAPSULE ORAL at 08:08

## 2021-08-28 RX ADMIN — Medication 1000 UNITS: at 08:08

## 2021-08-28 RX ADMIN — SODIUM CHLORIDE: 9 INJECTION, SOLUTION INTRAVENOUS at 12:08

## 2021-08-28 RX ADMIN — ACETAMINOPHEN 650 MG: 325 TABLET ORAL at 09:08

## 2021-08-28 RX ADMIN — ONDANSETRON 4 MG: 2 INJECTION INTRAMUSCULAR; INTRAVENOUS at 09:08

## 2021-08-28 RX ADMIN — DEXAMETHASONE SODIUM PHOSPHATE 6 MG: 4 INJECTION, SOLUTION INTRAMUSCULAR; INTRAVENOUS at 08:08

## 2021-08-29 LAB
ALBUMIN SERPL BCP-MCNC: 2.9 G/DL (ref 3.5–5)
ALBUMIN/GLOB SERPL: 1 {RATIO}
ALP SERPL-CCNC: 66 U/L (ref 41–108)
ALT SERPL W P-5'-P-CCNC: 57 U/L (ref 13–56)
ANION GAP SERPL CALCULATED.3IONS-SCNC: 9 MMOL/L (ref 7–16)
AST SERPL W P-5'-P-CCNC: 43 U/L (ref 15–37)
BASOPHILS # BLD AUTO: 0 K/UL (ref 0–0.2)
BASOPHILS NFR BLD AUTO: 0 % (ref 0–1)
BILIRUB SERPL-MCNC: 0.2 MG/DL (ref 0–1.2)
BUN SERPL-MCNC: 7 MG/DL (ref 7–18)
BUN/CREAT SERPL: 10 (ref 6–20)
CALCIUM SERPL-MCNC: 8.2 MG/DL (ref 8.5–10.1)
CHLORIDE SERPL-SCNC: 106 MMOL/L (ref 98–107)
CO2 SERPL-SCNC: 33 MMOL/L (ref 21–32)
CREAT SERPL-MCNC: 0.69 MG/DL (ref 0.55–1.02)
DIFFERENTIAL METHOD BLD: ABNORMAL
EOSINOPHIL # BLD AUTO: 0 K/UL (ref 0–0.5)
EOSINOPHIL NFR BLD AUTO: 0 % (ref 1–4)
ERYTHROCYTE [DISTWIDTH] IN BLOOD BY AUTOMATED COUNT: 14.4 % (ref 11.5–14.5)
GLOBULIN SER-MCNC: 2.9 G/DL (ref 2–4)
GLUCOSE SERPL-MCNC: 90 MG/DL (ref 74–106)
HCT VFR BLD AUTO: 38.3 % (ref 38–47)
HGB BLD-MCNC: 12.4 G/DL (ref 12–16)
LYMPHOCYTES # BLD AUTO: 1.11 K/UL (ref 1–4.8)
LYMPHOCYTES NFR BLD AUTO: 24.9 % (ref 27–41)
LYMPHOCYTES NFR BLD MANUAL: 26 % (ref 27–41)
MCH RBC QN AUTO: 29.6 PG (ref 27–31)
MCHC RBC AUTO-ENTMCNC: 32.4 G/DL (ref 32–36)
MCV RBC AUTO: 91.4 FL (ref 80–96)
MONOCYTES # BLD AUTO: 0.52 K/UL (ref 0–0.8)
MONOCYTES NFR BLD AUTO: 11.7 % (ref 2–6)
MONOCYTES NFR BLD MANUAL: 9 % (ref 2–6)
MPC BLD CALC-MCNC: 10.6 FL (ref 9.4–12.4)
NEUTROPHILS # BLD AUTO: 2.83 K/UL (ref 1.8–7.7)
NEUTROPHILS NFR BLD AUTO: 63.4 % (ref 53–65)
NEUTS BAND NFR BLD MANUAL: 2 % (ref 1–5)
NEUTS SEG NFR BLD MANUAL: 63 % (ref 50–62)
NRBC BLD MANUAL-RTO: ABNORMAL %
PLATELET # BLD AUTO: 157 K/UL (ref 150–400)
PLATELET MORPHOLOGY: NORMAL
POTASSIUM SERPL-SCNC: 3 MMOL/L (ref 3.5–5.1)
PROT SERPL-MCNC: 5.8 G/DL (ref 6.4–8.2)
RBC # BLD AUTO: 4.19 M/UL (ref 4.2–5.4)
RBC MORPH BLD: NORMAL
SODIUM SERPL-SCNC: 145 MMOL/L (ref 136–145)
WBC # BLD AUTO: 4.46 K/UL (ref 4.5–11)

## 2021-08-29 PROCEDURE — 80053 COMPREHEN METABOLIC PANEL: CPT | Performed by: EMERGENCY MEDICINE

## 2021-08-29 PROCEDURE — 27000944

## 2021-08-29 PROCEDURE — 63600175 PHARM REV CODE 636 W HCPCS: Performed by: NURSE PRACTITIONER

## 2021-08-29 PROCEDURE — 25000003 PHARM REV CODE 250: Performed by: NURSE PRACTITIONER

## 2021-08-29 PROCEDURE — 99233 PR SUBSEQUENT HOSPITAL CARE,LEVL III: ICD-10-PCS | Mod: 95,,, | Performed by: EMERGENCY MEDICINE

## 2021-08-29 PROCEDURE — 85025 COMPLETE CBC W/AUTO DIFF WBC: CPT | Performed by: NURSE PRACTITIONER

## 2021-08-29 PROCEDURE — 94761 N-INVAS EAR/PLS OXIMETRY MLT: CPT

## 2021-08-29 PROCEDURE — 99233 SBSQ HOSP IP/OBS HIGH 50: CPT | Mod: 95,,, | Performed by: EMERGENCY MEDICINE

## 2021-08-29 PROCEDURE — 36415 COLL VENOUS BLD VENIPUNCTURE: CPT | Performed by: EMERGENCY MEDICINE

## 2021-08-29 PROCEDURE — 94640 AIRWAY INHALATION TREATMENT: CPT

## 2021-08-29 PROCEDURE — 25000003 PHARM REV CODE 250: Performed by: EMERGENCY MEDICINE

## 2021-08-29 PROCEDURE — 11000001 HC ACUTE MED/SURG PRIVATE ROOM

## 2021-08-29 PROCEDURE — 99900035 HC TECH TIME PER 15 MIN (STAT)

## 2021-08-29 PROCEDURE — 27000221 HC OXYGEN, UP TO 24 HOURS

## 2021-08-29 PROCEDURE — 63600175 PHARM REV CODE 636 W HCPCS: Performed by: EMERGENCY MEDICINE

## 2021-08-29 RX ORDER — POTASSIUM CHLORIDE 20 MEQ/1
20 TABLET, EXTENDED RELEASE ORAL 2 TIMES DAILY
Status: DISCONTINUED | OUTPATIENT
Start: 2021-08-29 | End: 2021-09-03 | Stop reason: HOSPADM

## 2021-08-29 RX ADMIN — CEFTRIAXONE 1 G: 1 INJECTION, POWDER, FOR SOLUTION INTRAMUSCULAR; INTRAVENOUS at 09:08

## 2021-08-29 RX ADMIN — SODIUM CHLORIDE: 9 INJECTION, SOLUTION INTRAVENOUS at 06:08

## 2021-08-29 RX ADMIN — OXYCODONE HYDROCHLORIDE AND ACETAMINOPHEN 1000 MG: 500 TABLET ORAL at 08:08

## 2021-08-29 RX ADMIN — APIXABAN 5 MG: 2.5 TABLET, FILM COATED ORAL at 08:08

## 2021-08-29 RX ADMIN — POTASSIUM CHLORIDE 20 MEQ: 20 TABLET, EXTENDED RELEASE ORAL at 01:08

## 2021-08-29 RX ADMIN — AZITHROMYCIN MONOHYDRATE 500 MG: 500 INJECTION, POWDER, LYOPHILIZED, FOR SOLUTION INTRAVENOUS at 12:08

## 2021-08-29 RX ADMIN — ZINC SULFATE 220 MG (50 MG) CAPSULE 220 MG: CAPSULE at 08:08

## 2021-08-29 RX ADMIN — FAMOTIDINE 20 MG: 20 TABLET ORAL at 08:08

## 2021-08-29 RX ADMIN — ALBUTEROL SULFATE 2 PUFF: 90 AEROSOL, METERED RESPIRATORY (INHALATION) at 06:08

## 2021-08-29 RX ADMIN — FLUOXETINE 40 MG: 10 CAPSULE ORAL at 08:08

## 2021-08-29 RX ADMIN — ACETAMINOPHEN 650 MG: 325 TABLET ORAL at 08:08

## 2021-08-29 RX ADMIN — ALBUTEROL SULFATE 2 PUFF: 90 AEROSOL, METERED RESPIRATORY (INHALATION) at 07:08

## 2021-08-29 RX ADMIN — DEXAMETHASONE SODIUM PHOSPHATE 6 MG: 4 INJECTION, SOLUTION INTRAMUSCULAR; INTRAVENOUS at 08:08

## 2021-08-29 RX ADMIN — ALBUTEROL SULFATE 2 PUFF: 90 AEROSOL, METERED RESPIRATORY (INHALATION) at 12:08

## 2021-08-29 RX ADMIN — POTASSIUM CHLORIDE 20 MEQ: 20 TABLET, EXTENDED RELEASE ORAL at 08:08

## 2021-08-29 RX ADMIN — SODIUM CHLORIDE: 9 INJECTION, SOLUTION INTRAVENOUS at 03:08

## 2021-08-29 RX ADMIN — REMDESIVIR 100 MG: 100 INJECTION, POWDER, LYOPHILIZED, FOR SOLUTION INTRAVENOUS at 08:08

## 2021-08-29 RX ADMIN — Medication 1000 UNITS: at 08:08

## 2021-08-30 LAB
ALBUMIN SERPL BCP-MCNC: 3.1 G/DL (ref 3.5–5)
ALBUMIN/GLOB SERPL: 0.9 {RATIO}
ALP SERPL-CCNC: 81 U/L (ref 41–108)
ALT SERPL W P-5'-P-CCNC: 52 U/L (ref 13–56)
ANION GAP SERPL CALCULATED.3IONS-SCNC: 12 MMOL/L (ref 7–16)
AST SERPL W P-5'-P-CCNC: 37 U/L (ref 15–37)
BASOPHILS # BLD AUTO: 0 K/UL (ref 0–0.2)
BASOPHILS NFR BLD AUTO: 0 % (ref 0–1)
BILIRUB SERPL-MCNC: 0.2 MG/DL (ref 0–1.2)
BUN SERPL-MCNC: 7 MG/DL (ref 7–18)
BUN/CREAT SERPL: 11 (ref 6–20)
CALCIUM SERPL-MCNC: 8.4 MG/DL (ref 8.5–10.1)
CHLORIDE SERPL-SCNC: 104 MMOL/L (ref 98–107)
CO2 SERPL-SCNC: 30 MMOL/L (ref 21–32)
CREAT SERPL-MCNC: 0.66 MG/DL (ref 0.55–1.02)
DIFFERENTIAL METHOD BLD: ABNORMAL
EOSINOPHIL # BLD AUTO: 0 K/UL (ref 0–0.5)
EOSINOPHIL NFR BLD AUTO: 0 % (ref 1–4)
ERYTHROCYTE [DISTWIDTH] IN BLOOD BY AUTOMATED COUNT: 14.2 % (ref 11.5–14.5)
GLOBULIN SER-MCNC: 3.3 G/DL (ref 2–4)
GLUCOSE SERPL-MCNC: 99 MG/DL (ref 74–106)
HCT VFR BLD AUTO: 42 % (ref 38–47)
HGB BLD-MCNC: 13.7 G/DL (ref 12–16)
LYMPHOCYTES # BLD AUTO: 1.19 K/UL (ref 1–4.8)
LYMPHOCYTES NFR BLD AUTO: 16.7 % (ref 27–41)
LYMPHOCYTES NFR BLD MANUAL: 11 % (ref 27–41)
MCH RBC QN AUTO: 29.7 PG (ref 27–31)
MCHC RBC AUTO-ENTMCNC: 32.6 G/DL (ref 32–36)
MCV RBC AUTO: 90.9 FL (ref 80–96)
MONOCYTES # BLD AUTO: 0.63 K/UL (ref 0–0.8)
MONOCYTES NFR BLD AUTO: 8.8 % (ref 2–6)
MONOCYTES NFR BLD MANUAL: 4 % (ref 2–6)
MPC BLD CALC-MCNC: 10.3 FL (ref 9.4–12.4)
NEUTROPHILS # BLD AUTO: 5.3 K/UL (ref 1.8–7.7)
NEUTROPHILS NFR BLD AUTO: 74.5 % (ref 53–65)
NEUTS BAND NFR BLD MANUAL: 1 % (ref 1–5)
NEUTS SEG NFR BLD MANUAL: 84 % (ref 50–62)
NRBC BLD MANUAL-RTO: ABNORMAL %
PLATELET # BLD AUTO: 173 K/UL (ref 150–400)
PLATELET MORPHOLOGY: NORMAL
POTASSIUM SERPL-SCNC: 3.3 MMOL/L (ref 3.5–5.1)
PROT SERPL-MCNC: 6.4 G/DL (ref 6.4–8.2)
RBC # BLD AUTO: 4.62 M/UL (ref 4.2–5.4)
RBC MORPH BLD: NORMAL
SODIUM SERPL-SCNC: 143 MMOL/L (ref 136–145)
WBC # BLD AUTO: 7.12 K/UL (ref 4.5–11)

## 2021-08-30 PROCEDURE — 99900035 HC TECH TIME PER 15 MIN (STAT)

## 2021-08-30 PROCEDURE — 25000003 PHARM REV CODE 250: Performed by: NURSE PRACTITIONER

## 2021-08-30 PROCEDURE — 94761 N-INVAS EAR/PLS OXIMETRY MLT: CPT

## 2021-08-30 PROCEDURE — 63600175 PHARM REV CODE 636 W HCPCS: Performed by: EMERGENCY MEDICINE

## 2021-08-30 PROCEDURE — 63600175 PHARM REV CODE 636 W HCPCS: Performed by: NURSE PRACTITIONER

## 2021-08-30 PROCEDURE — 80053 COMPREHEN METABOLIC PANEL: CPT | Performed by: EMERGENCY MEDICINE

## 2021-08-30 PROCEDURE — 25000003 PHARM REV CODE 250: Performed by: EMERGENCY MEDICINE

## 2021-08-30 PROCEDURE — 36415 COLL VENOUS BLD VENIPUNCTURE: CPT | Performed by: EMERGENCY MEDICINE

## 2021-08-30 PROCEDURE — 99233 PR SUBSEQUENT HOSPITAL CARE,LEVL III: ICD-10-PCS | Mod: ,,, | Performed by: EMERGENCY MEDICINE

## 2021-08-30 PROCEDURE — 11000001 HC ACUTE MED/SURG PRIVATE ROOM

## 2021-08-30 PROCEDURE — 27000221 HC OXYGEN, UP TO 24 HOURS

## 2021-08-30 PROCEDURE — 85025 COMPLETE CBC W/AUTO DIFF WBC: CPT | Performed by: NURSE PRACTITIONER

## 2021-08-30 PROCEDURE — 94640 AIRWAY INHALATION TREATMENT: CPT

## 2021-08-30 PROCEDURE — 99233 SBSQ HOSP IP/OBS HIGH 50: CPT | Mod: ,,, | Performed by: EMERGENCY MEDICINE

## 2021-08-30 RX ORDER — AZITHROMYCIN 250 MG/1
500 TABLET, FILM COATED ORAL DAILY
Status: COMPLETED | OUTPATIENT
Start: 2021-08-31 | End: 2021-09-02

## 2021-08-30 RX ADMIN — POTASSIUM CHLORIDE 20 MEQ: 20 TABLET, EXTENDED RELEASE ORAL at 08:08

## 2021-08-30 RX ADMIN — AZITHROMYCIN MONOHYDRATE 500 MG: 500 INJECTION, POWDER, LYOPHILIZED, FOR SOLUTION INTRAVENOUS at 12:08

## 2021-08-30 RX ADMIN — DEXAMETHASONE SODIUM PHOSPHATE 6 MG: 4 INJECTION, SOLUTION INTRAMUSCULAR; INTRAVENOUS at 08:08

## 2021-08-30 RX ADMIN — Medication 1000 UNITS: at 08:08

## 2021-08-30 RX ADMIN — FAMOTIDINE 20 MG: 20 TABLET ORAL at 08:08

## 2021-08-30 RX ADMIN — OXYCODONE HYDROCHLORIDE AND ACETAMINOPHEN 1000 MG: 500 TABLET ORAL at 08:08

## 2021-08-30 RX ADMIN — ALBUTEROL SULFATE 2 PUFF: 90 AEROSOL, METERED RESPIRATORY (INHALATION) at 07:08

## 2021-08-30 RX ADMIN — ACETAMINOPHEN 650 MG: 325 TABLET ORAL at 02:08

## 2021-08-30 RX ADMIN — FLUOXETINE 40 MG: 10 CAPSULE ORAL at 08:08

## 2021-08-30 RX ADMIN — APIXABAN 5 MG: 2.5 TABLET, FILM COATED ORAL at 08:08

## 2021-08-30 RX ADMIN — CEFTRIAXONE 1 G: 1 INJECTION, POWDER, FOR SOLUTION INTRAMUSCULAR; INTRAVENOUS at 09:08

## 2021-08-30 RX ADMIN — ALBUTEROL SULFATE 2 PUFF: 90 AEROSOL, METERED RESPIRATORY (INHALATION) at 12:08

## 2021-08-30 RX ADMIN — SODIUM CHLORIDE: 9 INJECTION, SOLUTION INTRAVENOUS at 07:08

## 2021-08-30 RX ADMIN — ZINC SULFATE 220 MG (50 MG) CAPSULE 220 MG: CAPSULE at 08:08

## 2021-08-30 RX ADMIN — REMDESIVIR 100 MG: 100 INJECTION, POWDER, LYOPHILIZED, FOR SOLUTION INTRAVENOUS at 08:08

## 2021-08-31 LAB
ALBUMIN SERPL BCP-MCNC: 3 G/DL (ref 3.5–5)
ALBUMIN/GLOB SERPL: 0.9 {RATIO}
ALP SERPL-CCNC: 77 U/L (ref 41–108)
ALT SERPL W P-5'-P-CCNC: 43 U/L (ref 13–56)
ANION GAP SERPL CALCULATED.3IONS-SCNC: 11 MMOL/L (ref 7–16)
AST SERPL W P-5'-P-CCNC: 31 U/L (ref 15–37)
BASOPHILS # BLD AUTO: 0 K/UL (ref 0–0.2)
BASOPHILS NFR BLD AUTO: 0 % (ref 0–1)
BILIRUB SERPL-MCNC: 0.4 MG/DL (ref 0–1.2)
BUN SERPL-MCNC: 7 MG/DL (ref 7–18)
BUN/CREAT SERPL: 11 (ref 6–20)
CALCIUM SERPL-MCNC: 8.4 MG/DL (ref 8.5–10.1)
CHLORIDE SERPL-SCNC: 104 MMOL/L (ref 98–107)
CO2 SERPL-SCNC: 31 MMOL/L (ref 21–32)
CREAT SERPL-MCNC: 0.66 MG/DL (ref 0.55–1.02)
DIFFERENTIAL METHOD BLD: ABNORMAL
EOSINOPHIL # BLD AUTO: 0 K/UL (ref 0–0.5)
EOSINOPHIL NFR BLD AUTO: 0 % (ref 1–4)
ERYTHROCYTE [DISTWIDTH] IN BLOOD BY AUTOMATED COUNT: 13.8 % (ref 11.5–14.5)
GLOBULIN SER-MCNC: 3.3 G/DL (ref 2–4)
GLUCOSE SERPL-MCNC: 105 MG/DL (ref 74–106)
HCT VFR BLD AUTO: 40.7 % (ref 38–47)
HGB BLD-MCNC: 13.5 G/DL (ref 12–16)
LYMPHOCYTES # BLD AUTO: 1.22 K/UL (ref 1–4.8)
LYMPHOCYTES NFR BLD AUTO: 13.6 % (ref 27–41)
LYMPHOCYTES NFR BLD MANUAL: 14 % (ref 27–41)
MCH RBC QN AUTO: 29.5 PG (ref 27–31)
MCHC RBC AUTO-ENTMCNC: 33.2 G/DL (ref 32–36)
MCV RBC AUTO: 88.9 FL (ref 80–96)
MONOCYTES # BLD AUTO: 0.6 K/UL (ref 0–0.8)
MONOCYTES NFR BLD AUTO: 6.7 % (ref 2–6)
MONOCYTES NFR BLD MANUAL: 5 % (ref 2–6)
MPC BLD CALC-MCNC: 10.3 FL (ref 9.4–12.4)
NEUTROPHILS # BLD AUTO: 7.16 K/UL (ref 1.8–7.7)
NEUTROPHILS NFR BLD AUTO: 79.7 % (ref 53–65)
NEUTS BAND NFR BLD MANUAL: 1 % (ref 1–5)
NEUTS SEG NFR BLD MANUAL: 80 % (ref 50–62)
NRBC BLD MANUAL-RTO: ABNORMAL %
PLATELET # BLD AUTO: 159 K/UL (ref 150–400)
PLATELET MORPHOLOGY: NORMAL
POTASSIUM SERPL-SCNC: 3.4 MMOL/L (ref 3.5–5.1)
PROT SERPL-MCNC: 6.3 G/DL (ref 6.4–8.2)
RBC # BLD AUTO: 4.58 M/UL (ref 4.2–5.4)
RBC MORPH BLD: NORMAL
SODIUM SERPL-SCNC: 143 MMOL/L (ref 136–145)
WBC # BLD AUTO: 8.98 K/UL (ref 4.5–11)

## 2021-08-31 PROCEDURE — 36415 COLL VENOUS BLD VENIPUNCTURE: CPT | Performed by: EMERGENCY MEDICINE

## 2021-08-31 PROCEDURE — 94640 AIRWAY INHALATION TREATMENT: CPT

## 2021-08-31 PROCEDURE — 99222 PR INITIAL HOSPITAL CARE,LEVL II: ICD-10-PCS | Mod: ,,, | Performed by: INTERNAL MEDICINE

## 2021-08-31 PROCEDURE — 99233 PR SUBSEQUENT HOSPITAL CARE,LEVL III: ICD-10-PCS | Mod: ,,, | Performed by: NURSE PRACTITIONER

## 2021-08-31 PROCEDURE — 27000221 HC OXYGEN, UP TO 24 HOURS

## 2021-08-31 PROCEDURE — 63600175 PHARM REV CODE 636 W HCPCS: Performed by: EMERGENCY MEDICINE

## 2021-08-31 PROCEDURE — 85025 COMPLETE CBC W/AUTO DIFF WBC: CPT | Performed by: NURSE PRACTITIONER

## 2021-08-31 PROCEDURE — 99233 SBSQ HOSP IP/OBS HIGH 50: CPT | Mod: ,,, | Performed by: NURSE PRACTITIONER

## 2021-08-31 PROCEDURE — 80053 COMPREHEN METABOLIC PANEL: CPT | Performed by: EMERGENCY MEDICINE

## 2021-08-31 PROCEDURE — 94761 N-INVAS EAR/PLS OXIMETRY MLT: CPT

## 2021-08-31 PROCEDURE — 25000003 PHARM REV CODE 250: Performed by: EMERGENCY MEDICINE

## 2021-08-31 PROCEDURE — 25000003 PHARM REV CODE 250: Performed by: NURSE PRACTITIONER

## 2021-08-31 PROCEDURE — 99900035 HC TECH TIME PER 15 MIN (STAT)

## 2021-08-31 PROCEDURE — 63700000 PHARM REV CODE 250 ALT 637 W/O HCPCS: Performed by: EMERGENCY MEDICINE

## 2021-08-31 PROCEDURE — 11000001 HC ACUTE MED/SURG PRIVATE ROOM

## 2021-08-31 PROCEDURE — 63600175 PHARM REV CODE 636 W HCPCS: Performed by: NURSE PRACTITIONER

## 2021-08-31 PROCEDURE — 99222 1ST HOSP IP/OBS MODERATE 55: CPT | Mod: ,,, | Performed by: INTERNAL MEDICINE

## 2021-08-31 RX ORDER — HYDROCODONE BITARTRATE AND ACETAMINOPHEN 5; 325 MG/1; MG/1
1 TABLET ORAL EVERY 6 HOURS PRN
Status: DISCONTINUED | OUTPATIENT
Start: 2021-08-31 | End: 2021-09-01

## 2021-08-31 RX ADMIN — FAMOTIDINE 20 MG: 20 TABLET ORAL at 08:08

## 2021-08-31 RX ADMIN — DEXAMETHASONE SODIUM PHOSPHATE 6 MG: 4 INJECTION, SOLUTION INTRAMUSCULAR; INTRAVENOUS at 09:08

## 2021-08-31 RX ADMIN — APIXABAN 5 MG: 2.5 TABLET, FILM COATED ORAL at 09:08

## 2021-08-31 RX ADMIN — ALBUTEROL SULFATE 2 PUFF: 90 AEROSOL, METERED RESPIRATORY (INHALATION) at 07:08

## 2021-08-31 RX ADMIN — REMDESIVIR 100 MG: 100 INJECTION, POWDER, LYOPHILIZED, FOR SOLUTION INTRAVENOUS at 09:08

## 2021-08-31 RX ADMIN — ACETAMINOPHEN 650 MG: 325 TABLET ORAL at 04:08

## 2021-08-31 RX ADMIN — SODIUM CHLORIDE: 9 INJECTION, SOLUTION INTRAVENOUS at 01:08

## 2021-08-31 RX ADMIN — APIXABAN 5 MG: 2.5 TABLET, FILM COATED ORAL at 08:08

## 2021-08-31 RX ADMIN — OXYCODONE HYDROCHLORIDE AND ACETAMINOPHEN 1000 MG: 500 TABLET ORAL at 09:08

## 2021-08-31 RX ADMIN — ACETAMINOPHEN 650 MG: 325 TABLET ORAL at 10:08

## 2021-08-31 RX ADMIN — CEFTRIAXONE 1 G: 1 INJECTION, POWDER, FOR SOLUTION INTRAMUSCULAR; INTRAVENOUS at 10:08

## 2021-08-31 RX ADMIN — POTASSIUM CHLORIDE 20 MEQ: 20 TABLET, EXTENDED RELEASE ORAL at 09:08

## 2021-08-31 RX ADMIN — AZITHROMYCIN MONOHYDRATE 500 MG: 250 TABLET ORAL at 09:08

## 2021-08-31 RX ADMIN — FAMOTIDINE 20 MG: 20 TABLET ORAL at 09:08

## 2021-08-31 RX ADMIN — ALBUTEROL SULFATE 2 PUFF: 90 AEROSOL, METERED RESPIRATORY (INHALATION) at 02:08

## 2021-08-31 RX ADMIN — ZINC SULFATE 220 MG (50 MG) CAPSULE 220 MG: CAPSULE at 09:08

## 2021-08-31 RX ADMIN — SODIUM CHLORIDE: 9 INJECTION, SOLUTION INTRAVENOUS at 04:08

## 2021-08-31 RX ADMIN — POTASSIUM CHLORIDE 20 MEQ: 20 TABLET, EXTENDED RELEASE ORAL at 08:08

## 2021-08-31 RX ADMIN — FLUOXETINE 40 MG: 10 CAPSULE ORAL at 09:08

## 2021-08-31 RX ADMIN — Medication 1000 UNITS: at 09:08

## 2021-08-31 RX ADMIN — ALBUTEROL SULFATE 2 PUFF: 90 AEROSOL, METERED RESPIRATORY (INHALATION) at 12:08

## 2021-09-01 LAB
ALBUMIN SERPL BCP-MCNC: 3.3 G/DL (ref 3.5–5)
ALBUMIN/GLOB SERPL: 0.8 {RATIO}
ALP SERPL-CCNC: 88 U/L (ref 41–108)
ALT SERPL W P-5'-P-CCNC: 44 U/L (ref 13–56)
ANION GAP SERPL CALCULATED.3IONS-SCNC: 8 MMOL/L (ref 7–16)
AST SERPL W P-5'-P-CCNC: 32 U/L (ref 15–37)
BILIRUB SERPL-MCNC: 0.5 MG/DL (ref 0–1.2)
BUN SERPL-MCNC: 10 MG/DL (ref 7–18)
BUN/CREAT SERPL: 11 (ref 6–20)
CALCIUM SERPL-MCNC: 9.2 MG/DL (ref 8.5–10.1)
CHLORIDE SERPL-SCNC: 104 MMOL/L (ref 98–107)
CO2 SERPL-SCNC: 35 MMOL/L (ref 21–32)
CREAT SERPL-MCNC: 0.88 MG/DL (ref 0.55–1.02)
GLOBULIN SER-MCNC: 3.9 G/DL (ref 2–4)
GLUCOSE SERPL-MCNC: 120 MG/DL (ref 74–106)
POTASSIUM SERPL-SCNC: 3.6 MMOL/L (ref 3.5–5.1)
PROT SERPL-MCNC: 7.2 G/DL (ref 6.4–8.2)
SODIUM SERPL-SCNC: 143 MMOL/L (ref 136–145)

## 2021-09-01 PROCEDURE — 11000001 HC ACUTE MED/SURG PRIVATE ROOM

## 2021-09-01 PROCEDURE — 27000221 HC OXYGEN, UP TO 24 HOURS

## 2021-09-01 PROCEDURE — 94640 AIRWAY INHALATION TREATMENT: CPT

## 2021-09-01 PROCEDURE — 25000003 PHARM REV CODE 250

## 2021-09-01 PROCEDURE — 25000003 PHARM REV CODE 250: Performed by: NURSE PRACTITIONER

## 2021-09-01 PROCEDURE — 63700000 PHARM REV CODE 250 ALT 637 W/O HCPCS: Performed by: EMERGENCY MEDICINE

## 2021-09-01 PROCEDURE — 63600175 PHARM REV CODE 636 W HCPCS: Performed by: NURSE PRACTITIONER

## 2021-09-01 PROCEDURE — 36415 COLL VENOUS BLD VENIPUNCTURE: CPT | Performed by: EMERGENCY MEDICINE

## 2021-09-01 PROCEDURE — 99233 PR SUBSEQUENT HOSPITAL CARE,LEVL III: ICD-10-PCS | Mod: ,,, | Performed by: NURSE PRACTITIONER

## 2021-09-01 PROCEDURE — 63600175 PHARM REV CODE 636 W HCPCS: Performed by: EMERGENCY MEDICINE

## 2021-09-01 PROCEDURE — 80053 COMPREHEN METABOLIC PANEL: CPT | Performed by: EMERGENCY MEDICINE

## 2021-09-01 PROCEDURE — 94761 N-INVAS EAR/PLS OXIMETRY MLT: CPT

## 2021-09-01 PROCEDURE — 99233 SBSQ HOSP IP/OBS HIGH 50: CPT | Mod: ,,, | Performed by: NURSE PRACTITIONER

## 2021-09-01 PROCEDURE — 99900035 HC TECH TIME PER 15 MIN (STAT)

## 2021-09-01 RX ORDER — ASCORBIC ACID 500 MG
TABLET ORAL
Status: COMPLETED
Start: 2021-09-01 | End: 2021-09-01

## 2021-09-01 RX ADMIN — Medication 1000 UNITS: at 09:09

## 2021-09-01 RX ADMIN — OXYCODONE HYDROCHLORIDE AND ACETAMINOPHEN 1000 MG: 500 TABLET ORAL at 09:09

## 2021-09-01 RX ADMIN — SODIUM CHLORIDE: 9 INJECTION, SOLUTION INTRAVENOUS at 05:09

## 2021-09-01 RX ADMIN — FLUOXETINE 40 MG: 10 CAPSULE ORAL at 09:09

## 2021-09-01 RX ADMIN — ALBUTEROL SULFATE 2 PUFF: 90 AEROSOL, METERED RESPIRATORY (INHALATION) at 08:09

## 2021-09-01 RX ADMIN — AZITHROMYCIN MONOHYDRATE 500 MG: 250 TABLET ORAL at 09:09

## 2021-09-01 RX ADMIN — DEXAMETHASONE SODIUM PHOSPHATE 6 MG: 4 INJECTION, SOLUTION INTRAMUSCULAR; INTRAVENOUS at 09:09

## 2021-09-01 RX ADMIN — APIXABAN 5 MG: 2.5 TABLET, FILM COATED ORAL at 09:09

## 2021-09-01 RX ADMIN — FAMOTIDINE 20 MG: 20 TABLET ORAL at 08:09

## 2021-09-01 RX ADMIN — ALBUTEROL SULFATE 2 PUFF: 90 AEROSOL, METERED RESPIRATORY (INHALATION) at 06:09

## 2021-09-01 RX ADMIN — FAMOTIDINE 20 MG: 20 TABLET ORAL at 09:09

## 2021-09-01 RX ADMIN — POTASSIUM CHLORIDE 20 MEQ: 20 TABLET, EXTENDED RELEASE ORAL at 08:09

## 2021-09-01 RX ADMIN — ALBUTEROL SULFATE 2 PUFF: 90 AEROSOL, METERED RESPIRATORY (INHALATION) at 01:09

## 2021-09-01 RX ADMIN — APIXABAN 5 MG: 2.5 TABLET, FILM COATED ORAL at 08:09

## 2021-09-01 RX ADMIN — ACETAMINOPHEN 650 MG: 325 TABLET ORAL at 03:09

## 2021-09-01 RX ADMIN — ALBUTEROL SULFATE 2 PUFF: 90 AEROSOL, METERED RESPIRATORY (INHALATION) at 12:09

## 2021-09-01 RX ADMIN — POTASSIUM CHLORIDE 20 MEQ: 20 TABLET, EXTENDED RELEASE ORAL at 09:09

## 2021-09-01 RX ADMIN — SODIUM CHLORIDE: 9 INJECTION, SOLUTION INTRAVENOUS at 08:09

## 2021-09-01 RX ADMIN — ZINC SULFATE 220 MG (50 MG) CAPSULE 220 MG: CAPSULE at 09:09

## 2021-09-01 RX ADMIN — CEFTRIAXONE 1 G: 1 INJECTION, POWDER, FOR SOLUTION INTRAMUSCULAR; INTRAVENOUS at 10:09

## 2021-09-02 LAB
BACTERIA BLD CULT: NORMAL
BACTERIA BLD CULT: NORMAL

## 2021-09-02 PROCEDURE — 99233 SBSQ HOSP IP/OBS HIGH 50: CPT | Mod: ,,, | Performed by: EMERGENCY MEDICINE

## 2021-09-02 PROCEDURE — 94761 N-INVAS EAR/PLS OXIMETRY MLT: CPT

## 2021-09-02 PROCEDURE — 63700000 PHARM REV CODE 250 ALT 637 W/O HCPCS: Performed by: EMERGENCY MEDICINE

## 2021-09-02 PROCEDURE — 94640 AIRWAY INHALATION TREATMENT: CPT

## 2021-09-02 PROCEDURE — 25000003 PHARM REV CODE 250: Performed by: NURSE PRACTITIONER

## 2021-09-02 PROCEDURE — 63600175 PHARM REV CODE 636 W HCPCS: Performed by: EMERGENCY MEDICINE

## 2021-09-02 PROCEDURE — 11000001 HC ACUTE MED/SURG PRIVATE ROOM

## 2021-09-02 PROCEDURE — 63600175 PHARM REV CODE 636 W HCPCS: Performed by: NURSE PRACTITIONER

## 2021-09-02 PROCEDURE — 27000221 HC OXYGEN, UP TO 24 HOURS

## 2021-09-02 PROCEDURE — 99900035 HC TECH TIME PER 15 MIN (STAT)

## 2021-09-02 PROCEDURE — 99233 PR SUBSEQUENT HOSPITAL CARE,LEVL III: ICD-10-PCS | Mod: ,,, | Performed by: EMERGENCY MEDICINE

## 2021-09-02 RX ADMIN — FLUOXETINE 40 MG: 10 CAPSULE ORAL at 08:09

## 2021-09-02 RX ADMIN — ZINC SULFATE 220 MG (50 MG) CAPSULE 220 MG: CAPSULE at 08:09

## 2021-09-02 RX ADMIN — AZITHROMYCIN MONOHYDRATE 500 MG: 250 TABLET ORAL at 08:09

## 2021-09-02 RX ADMIN — APIXABAN 5 MG: 2.5 TABLET, FILM COATED ORAL at 08:09

## 2021-09-02 RX ADMIN — ALBUTEROL SULFATE 2 PUFF: 90 AEROSOL, METERED RESPIRATORY (INHALATION) at 06:09

## 2021-09-02 RX ADMIN — POTASSIUM CHLORIDE 20 MEQ: 20 TABLET, EXTENDED RELEASE ORAL at 08:09

## 2021-09-02 RX ADMIN — Medication 1000 UNITS: at 08:09

## 2021-09-02 RX ADMIN — SODIUM CHLORIDE: 9 INJECTION, SOLUTION INTRAVENOUS at 11:09

## 2021-09-02 RX ADMIN — ALBUTEROL SULFATE 2 PUFF: 90 AEROSOL, METERED RESPIRATORY (INHALATION) at 12:09

## 2021-09-02 RX ADMIN — FAMOTIDINE 20 MG: 20 TABLET ORAL at 08:09

## 2021-09-02 RX ADMIN — OXYCODONE HYDROCHLORIDE AND ACETAMINOPHEN 1000 MG: 500 TABLET ORAL at 08:09

## 2021-09-02 RX ADMIN — ALBUTEROL SULFATE 2 PUFF: 90 AEROSOL, METERED RESPIRATORY (INHALATION) at 01:09

## 2021-09-02 RX ADMIN — DEXAMETHASONE SODIUM PHOSPHATE 6 MG: 4 INJECTION, SOLUTION INTRAMUSCULAR; INTRAVENOUS at 08:09

## 2021-09-02 RX ADMIN — ONDANSETRON 4 MG: 2 INJECTION INTRAMUSCULAR; INTRAVENOUS at 07:09

## 2021-09-02 RX ADMIN — ACETAMINOPHEN 650 MG: 325 TABLET ORAL at 05:09

## 2021-09-03 VITALS
HEART RATE: 73 BPM | DIASTOLIC BLOOD PRESSURE: 82 MMHG | OXYGEN SATURATION: 95 % | RESPIRATION RATE: 18 BRPM | SYSTOLIC BLOOD PRESSURE: 133 MMHG | WEIGHT: 145 LBS | BODY MASS INDEX: 25.69 KG/M2 | TEMPERATURE: 98 F | HEIGHT: 63 IN

## 2021-09-03 PROCEDURE — 94640 AIRWAY INHALATION TREATMENT: CPT

## 2021-09-03 PROCEDURE — 99900035 HC TECH TIME PER 15 MIN (STAT)

## 2021-09-03 PROCEDURE — 27000221 HC OXYGEN, UP TO 24 HOURS

## 2021-09-03 PROCEDURE — 99239 HOSP IP/OBS DSCHRG MGMT >30: CPT | Mod: ,,, | Performed by: NURSE PRACTITIONER

## 2021-09-03 PROCEDURE — 63600175 PHARM REV CODE 636 W HCPCS: Performed by: EMERGENCY MEDICINE

## 2021-09-03 PROCEDURE — 25000003 PHARM REV CODE 250: Performed by: NURSE PRACTITIONER

## 2021-09-03 PROCEDURE — 94761 N-INVAS EAR/PLS OXIMETRY MLT: CPT

## 2021-09-03 PROCEDURE — 99239 PR HOSPITAL DISCHARGE DAY,>30 MIN: ICD-10-PCS | Mod: ,,, | Performed by: NURSE PRACTITIONER

## 2021-09-03 RX ORDER — ONDANSETRON 4 MG/1
4 TABLET, FILM COATED ORAL EVERY 8 HOURS PRN
Qty: 21 TABLET | Refills: 0 | Status: SHIPPED | OUTPATIENT
Start: 2021-09-03 | End: 2022-02-17 | Stop reason: SDUPTHER

## 2021-09-03 RX ORDER — ALBUTEROL SULFATE 90 UG/1
2 AEROSOL, METERED RESPIRATORY (INHALATION) EVERY 6 HOURS
Qty: 18 G | Refills: 0 | Status: SHIPPED | OUTPATIENT
Start: 2021-09-03 | End: 2021-11-03 | Stop reason: SDUPTHER

## 2021-09-03 RX ORDER — DEXAMETHASONE 6 MG/1
6 TABLET ORAL
Qty: 10 TABLET | Refills: 0 | Status: SHIPPED | OUTPATIENT
Start: 2021-09-03 | End: 2021-09-13

## 2021-09-03 RX ORDER — FAMOTIDINE 20 MG/1
20 TABLET, FILM COATED ORAL 2 TIMES DAILY
Qty: 60 TABLET | Refills: 11 | Status: SHIPPED | OUTPATIENT
Start: 2021-09-03 | End: 2022-11-30 | Stop reason: SDUPTHER

## 2021-09-03 RX ADMIN — ALBUTEROL SULFATE 2 PUFF: 90 AEROSOL, METERED RESPIRATORY (INHALATION) at 06:09

## 2021-09-03 RX ADMIN — ALBUTEROL SULFATE 2 PUFF: 90 AEROSOL, METERED RESPIRATORY (INHALATION) at 12:09

## 2021-09-03 RX ADMIN — ALBUTEROL SULFATE 2 PUFF: 90 AEROSOL, METERED RESPIRATORY (INHALATION) at 01:09

## 2021-09-03 RX ADMIN — OXYCODONE HYDROCHLORIDE AND ACETAMINOPHEN 1000 MG: 500 TABLET ORAL at 08:09

## 2021-09-03 RX ADMIN — DEXAMETHASONE SODIUM PHOSPHATE 6 MG: 4 INJECTION, SOLUTION INTRAMUSCULAR; INTRAVENOUS at 08:09

## 2021-09-03 RX ADMIN — POTASSIUM CHLORIDE 20 MEQ: 20 TABLET, EXTENDED RELEASE ORAL at 08:09

## 2021-09-03 RX ADMIN — Medication 1000 UNITS: at 08:09

## 2021-09-03 RX ADMIN — APIXABAN 5 MG: 2.5 TABLET, FILM COATED ORAL at 08:09

## 2021-09-03 RX ADMIN — FLUOXETINE 40 MG: 10 CAPSULE ORAL at 08:09

## 2021-09-03 RX ADMIN — SODIUM CHLORIDE: 9 INJECTION, SOLUTION INTRAVENOUS at 02:09

## 2021-09-03 RX ADMIN — FAMOTIDINE 20 MG: 20 TABLET ORAL at 08:09

## 2021-09-03 RX ADMIN — ZINC SULFATE 220 MG (50 MG) CAPSULE 220 MG: CAPSULE at 08:09

## 2021-11-02 LAB
CHOLEST SERPL-MSCNC: 202 MG/DL (ref 0–200)
HDLC SERPL-MCNC: 49 MG/DL (ref 35–70)
HIV: NEGATIVE
LDLC SERPL CALC-MCNC: 133 MG/DL (ref 0–160)
TRIGL SERPL-MCNC: 101 MG/DL (ref 40–160)

## 2021-11-03 ENCOUNTER — OFFICE VISIT (OUTPATIENT)
Dept: FAMILY MEDICINE | Facility: CLINIC | Age: 51
End: 2021-11-03
Payer: COMMERCIAL

## 2021-11-03 VITALS — DIASTOLIC BLOOD PRESSURE: 96 MMHG | SYSTOLIC BLOOD PRESSURE: 146 MMHG | HEART RATE: 81 BPM

## 2021-11-03 DIAGNOSIS — Z86.16 HISTORY OF 2019 NOVEL CORONAVIRUS DISEASE (COVID-19): ICD-10-CM

## 2021-11-03 DIAGNOSIS — R74.8 ELEVATED LIVER ENZYMES: ICD-10-CM

## 2021-11-03 DIAGNOSIS — F41.1 GAD (GENERALIZED ANXIETY DISORDER): Primary | Chronic | ICD-10-CM

## 2021-11-03 DIAGNOSIS — E87.6 HYPOKALEMIA: ICD-10-CM

## 2021-11-03 DIAGNOSIS — R06.02 SOB (SHORTNESS OF BREATH) ON EXERTION: ICD-10-CM

## 2021-11-03 PROCEDURE — 1159F MED LIST DOCD IN RCRD: CPT | Mod: ,,, | Performed by: FAMILY MEDICINE

## 2021-11-03 PROCEDURE — 80053 COMPREHENSIVE METABOLIC PANEL: ICD-10-PCS | Mod: ,,, | Performed by: CLINICAL MEDICAL LABORATORY

## 2021-11-03 PROCEDURE — 3077F PR MOST RECENT SYSTOLIC BLOOD PRESSURE >= 140 MM HG: ICD-10-PCS | Mod: ,,, | Performed by: FAMILY MEDICINE

## 2021-11-03 PROCEDURE — 1160F RVW MEDS BY RX/DR IN RCRD: CPT | Mod: ,,, | Performed by: FAMILY MEDICINE

## 2021-11-03 PROCEDURE — 3080F PR MOST RECENT DIASTOLIC BLOOD PRESSURE >= 90 MM HG: ICD-10-PCS | Mod: ,,, | Performed by: FAMILY MEDICINE

## 2021-11-03 PROCEDURE — 85025 CBC WITH DIFFERENTIAL: ICD-10-PCS | Mod: ,,, | Performed by: CLINICAL MEDICAL LABORATORY

## 2021-11-03 PROCEDURE — 3080F DIAST BP >= 90 MM HG: CPT | Mod: ,,, | Performed by: FAMILY MEDICINE

## 2021-11-03 PROCEDURE — 99214 OFFICE O/P EST MOD 30 MIN: CPT | Mod: ,,, | Performed by: FAMILY MEDICINE

## 2021-11-03 PROCEDURE — 1160F PR REVIEW ALL MEDS BY PRESCRIBER/CLIN PHARMACIST DOCUMENTED: ICD-10-PCS | Mod: ,,, | Performed by: FAMILY MEDICINE

## 2021-11-03 PROCEDURE — 85025 COMPLETE CBC W/AUTO DIFF WBC: CPT | Mod: ,,, | Performed by: CLINICAL MEDICAL LABORATORY

## 2021-11-03 PROCEDURE — 3077F SYST BP >= 140 MM HG: CPT | Mod: ,,, | Performed by: FAMILY MEDICINE

## 2021-11-03 PROCEDURE — 99214 PR OFFICE/OUTPT VISIT, EST, LEVL IV, 30-39 MIN: ICD-10-PCS | Mod: ,,, | Performed by: FAMILY MEDICINE

## 2021-11-03 PROCEDURE — 80053 COMPREHEN METABOLIC PANEL: CPT | Mod: ,,, | Performed by: CLINICAL MEDICAL LABORATORY

## 2021-11-03 PROCEDURE — 1159F PR MEDICATION LIST DOCUMENTED IN MEDICAL RECORD: ICD-10-PCS | Mod: ,,, | Performed by: FAMILY MEDICINE

## 2021-11-03 RX ORDER — ALBUTEROL SULFATE 90 UG/1
2 AEROSOL, METERED RESPIRATORY (INHALATION) EVERY 6 HOURS
Qty: 8 G | Refills: 5 | Status: SHIPPED | OUTPATIENT
Start: 2021-11-03 | End: 2022-02-17 | Stop reason: SDUPTHER

## 2021-11-03 RX ORDER — ALPRAZOLAM 1 MG/1
TABLET ORAL
Qty: 90 TABLET | Refills: 2 | Status: SHIPPED | OUTPATIENT
Start: 2021-11-03 | End: 2022-02-17 | Stop reason: SDUPTHER

## 2021-11-04 LAB
ALBUMIN SERPL BCP-MCNC: 3.4 G/DL (ref 3.5–5)
ALBUMIN/GLOB SERPL: 1 {RATIO}
ALP SERPL-CCNC: 74 U/L (ref 41–108)
ALT SERPL W P-5'-P-CCNC: 19 U/L (ref 13–56)
ANION GAP SERPL CALCULATED.3IONS-SCNC: 8 MMOL/L (ref 7–16)
AST SERPL W P-5'-P-CCNC: 23 U/L (ref 15–37)
BASOPHILS # BLD AUTO: 0.04 K/UL (ref 0–0.2)
BASOPHILS NFR BLD AUTO: 0.6 % (ref 0–1)
BILIRUB SERPL-MCNC: 0.2 MG/DL (ref 0–1.2)
BUN SERPL-MCNC: 12 MG/DL (ref 7–18)
BUN/CREAT SERPL: 14 (ref 6–20)
CALCIUM SERPL-MCNC: 9.2 MG/DL (ref 8.5–10.1)
CHLORIDE SERPL-SCNC: 106 MMOL/L (ref 98–107)
CO2 SERPL-SCNC: 30 MMOL/L (ref 21–32)
CREAT SERPL-MCNC: 0.87 MG/DL (ref 0.55–1.02)
DIFFERENTIAL METHOD BLD: ABNORMAL
EOSINOPHIL # BLD AUTO: 0.7 K/UL (ref 0–0.5)
EOSINOPHIL NFR BLD AUTO: 10.5 % (ref 1–4)
ERYTHROCYTE [DISTWIDTH] IN BLOOD BY AUTOMATED COUNT: 13.5 % (ref 11.5–14.5)
GLOBULIN SER-MCNC: 3.3 G/DL (ref 2–4)
GLUCOSE SERPL-MCNC: 85 MG/DL (ref 74–106)
HCT VFR BLD AUTO: 42 % (ref 38–47)
HGB BLD-MCNC: 13.9 G/DL (ref 12–16)
IMM GRANULOCYTES # BLD AUTO: 0.02 K/UL (ref 0–0.04)
IMM GRANULOCYTES NFR BLD: 0.3 % (ref 0–0.4)
LYMPHOCYTES # BLD AUTO: 1.84 K/UL (ref 1–4.8)
LYMPHOCYTES NFR BLD AUTO: 27.6 % (ref 27–41)
MCH RBC QN AUTO: 30.3 PG (ref 27–31)
MCHC RBC AUTO-ENTMCNC: 33.1 G/DL (ref 32–36)
MCV RBC AUTO: 91.5 FL (ref 80–96)
MONOCYTES # BLD AUTO: 0.48 K/UL (ref 0–0.8)
MONOCYTES NFR BLD AUTO: 7.2 % (ref 2–6)
MPC BLD CALC-MCNC: 11.7 FL (ref 9.4–12.4)
NEUTROPHILS # BLD AUTO: 3.58 K/UL (ref 1.8–7.7)
NEUTROPHILS NFR BLD AUTO: 53.8 % (ref 53–65)
NRBC # BLD AUTO: 0 X10E3/UL
NRBC, AUTO (.00): 0 %
PLATELET # BLD AUTO: 244 K/UL (ref 150–400)
POTASSIUM SERPL-SCNC: 4.2 MMOL/L (ref 3.5–5.1)
PROT SERPL-MCNC: 6.7 G/DL (ref 6.4–8.2)
RBC # BLD AUTO: 4.59 M/UL (ref 4.2–5.4)
SODIUM SERPL-SCNC: 140 MMOL/L (ref 136–145)
WBC # BLD AUTO: 6.66 K/UL (ref 4.5–11)

## 2022-02-17 ENCOUNTER — OFFICE VISIT (OUTPATIENT)
Dept: FAMILY MEDICINE | Facility: CLINIC | Age: 52
End: 2022-02-17
Payer: COMMERCIAL

## 2022-02-17 VITALS
HEART RATE: 76 BPM | HEIGHT: 63 IN | BODY MASS INDEX: 25.69 KG/M2 | SYSTOLIC BLOOD PRESSURE: 132 MMHG | DIASTOLIC BLOOD PRESSURE: 97 MMHG | WEIGHT: 145 LBS

## 2022-02-17 DIAGNOSIS — Z86.16 HISTORY OF 2019 NOVEL CORONAVIRUS DISEASE (COVID-19): ICD-10-CM

## 2022-02-17 DIAGNOSIS — R06.02 SOB (SHORTNESS OF BREATH) ON EXERTION: ICD-10-CM

## 2022-02-17 DIAGNOSIS — F33.9 RECURRENT MAJOR DEPRESSIVE DISORDER, REMISSION STATUS UNSPECIFIED: Primary | Chronic | ICD-10-CM

## 2022-02-17 DIAGNOSIS — U09.9 COVID-19 LONG HAULER: Chronic | ICD-10-CM

## 2022-02-17 DIAGNOSIS — F41.1 GAD (GENERALIZED ANXIETY DISORDER): Chronic | ICD-10-CM

## 2022-02-17 PROCEDURE — 3080F DIAST BP >= 90 MM HG: CPT | Mod: ,,, | Performed by: FAMILY MEDICINE

## 2022-02-17 PROCEDURE — 3080F PR MOST RECENT DIASTOLIC BLOOD PRESSURE >= 90 MM HG: ICD-10-PCS | Mod: ,,, | Performed by: FAMILY MEDICINE

## 2022-02-17 PROCEDURE — 1159F MED LIST DOCD IN RCRD: CPT | Mod: ,,, | Performed by: FAMILY MEDICINE

## 2022-02-17 PROCEDURE — 99214 PR OFFICE/OUTPT VISIT, EST, LEVL IV, 30-39 MIN: ICD-10-PCS | Mod: ,,, | Performed by: FAMILY MEDICINE

## 2022-02-17 PROCEDURE — 3075F PR MOST RECENT SYSTOLIC BLOOD PRESS GE 130-139MM HG: ICD-10-PCS | Mod: ,,, | Performed by: FAMILY MEDICINE

## 2022-02-17 PROCEDURE — 1160F PR REVIEW ALL MEDS BY PRESCRIBER/CLIN PHARMACIST DOCUMENTED: ICD-10-PCS | Mod: ,,, | Performed by: FAMILY MEDICINE

## 2022-02-17 PROCEDURE — 3075F SYST BP GE 130 - 139MM HG: CPT | Mod: ,,, | Performed by: FAMILY MEDICINE

## 2022-02-17 PROCEDURE — 99214 OFFICE O/P EST MOD 30 MIN: CPT | Mod: ,,, | Performed by: FAMILY MEDICINE

## 2022-02-17 PROCEDURE — 1159F PR MEDICATION LIST DOCUMENTED IN MEDICAL RECORD: ICD-10-PCS | Mod: ,,, | Performed by: FAMILY MEDICINE

## 2022-02-17 PROCEDURE — 3008F PR BODY MASS INDEX (BMI) DOCUMENTED: ICD-10-PCS | Mod: ,,, | Performed by: FAMILY MEDICINE

## 2022-02-17 PROCEDURE — 3008F BODY MASS INDEX DOCD: CPT | Mod: ,,, | Performed by: FAMILY MEDICINE

## 2022-02-17 PROCEDURE — 1160F RVW MEDS BY RX/DR IN RCRD: CPT | Mod: ,,, | Performed by: FAMILY MEDICINE

## 2022-02-17 RX ORDER — FLUOXETINE HYDROCHLORIDE 40 MG/1
40 CAPSULE ORAL DAILY
Qty: 90 CAPSULE | Refills: 1 | Status: SHIPPED | OUTPATIENT
Start: 2022-02-17 | End: 2022-02-17

## 2022-02-17 RX ORDER — ALPRAZOLAM 1 MG/1
TABLET ORAL
Qty: 90 TABLET | Refills: 2 | Status: SHIPPED | OUTPATIENT
Start: 2022-02-17 | End: 2022-04-25

## 2022-02-17 RX ORDER — ONDANSETRON 4 MG/1
4 TABLET, FILM COATED ORAL EVERY 8 HOURS PRN
Qty: 21 TABLET | Refills: 1 | Status: SHIPPED | OUTPATIENT
Start: 2022-02-17 | End: 2022-05-20 | Stop reason: SDUPTHER

## 2022-02-17 RX ORDER — ALBUTEROL SULFATE 90 UG/1
2 AEROSOL, METERED RESPIRATORY (INHALATION) EVERY 6 HOURS
Qty: 8 G | Refills: 5 | Status: SHIPPED | OUTPATIENT
Start: 2022-02-17 | End: 2022-05-20 | Stop reason: SDUPTHER

## 2022-02-17 RX ORDER — FLUOXETINE HYDROCHLORIDE 40 MG/1
40 CAPSULE ORAL DAILY
Qty: 90 CAPSULE | Refills: 1 | Status: SHIPPED | OUTPATIENT
Start: 2022-02-17 | End: 2022-05-20 | Stop reason: SDUPTHER

## 2022-02-17 NOTE — PROGRESS NOTES
Heath Doshi MD   [facility]  MEDICAL GROUP [facility]  [address] [place] [address]  [phone]      PATIENT NAME: Merissa Ochoa  : 1970  DATE: 22  MRN: [id number]      Billing Provider: Heath Doshi MD  Level of Service:   Patient PCP Information       Provider PCP Type    Heath Doshi MD General            Reason for Visit / Chief Complaint: Medication Refill       Update PCP  Update Chief Complaint         History of Present Illness / Problem Focused Workflow     Merissa Ochoa presents to the clinic with Medication Refill       52 yo WF here for follow up MDD and EAMON.  Needs meds refilled.  Says that she has chronic SOB and cough since having had COVID-19.  Uses inhaler prn.      Review of Systems     Review of Systems   Constitutional: Positive for fatigue. Negative for activity change, chills and fever.   HENT: Negative for sore throat.    Eyes: Negative for pain.   Respiratory: Positive for cough and shortness of breath. Negative for chest tightness.    Cardiovascular: Negative for chest pain and palpitations.   Gastrointestinal: Negative for abdominal pain.   Neurological: Negative for dizziness, syncope and weakness.   Psychiatric/Behavioral: Negative for confusion.        Medical / Social / Family History     Past Medical History:   Diagnosis Date    Anxiety attack     Diverticula, colon 2021    Family history of colonic polyps 2021    Polyp of descending colon 2021    Polyp of transverse colon 2021    Positive colorectal cancer screening using Cologuard test 2021       Past Surgical History:   Procedure Laterality Date    CHOLECYSTECTOMY      HYSTERECTOMY      TUBAL LIGATION         Social History    reports that she has been smoking cigarettes. She has never used smokeless tobacco. She reports previous alcohol use. She reports previous drug use.   Social History     Tobacco Use    Smoking  status: Current Every Day Smoker     Types: Cigarettes    Smokeless tobacco: Never Used   Substance Use Topics    Alcohol use: Not Currently    Drug use: Not Currently       Family History  Family History   Problem Relation Age of Onset    Diabetes Mellitus Other     Heart disease Other     Hypertension Other     Mental illness Other     Ovarian cancer Other        Medications and Allergies     Medications  No outpatient medications have been marked as taking for the 2/17/22 encounter (Office Visit) with Heath Doshi MD.       Allergies  Review of patient's allergies indicates:   Allergen Reactions    Betadine [povidone-iodine]     Propranolol     Lexapro [escitalopram] Nausea And Vomiting       Physical Examination     Vitals:    02/17/22 1044   BP: (!) 132/97   Pulse: 76     Physical Exam  Vitals reviewed.   Constitutional:       General: She is not in acute distress.     Appearance: Normal appearance. She is not ill-appearing or toxic-appearing.   HENT:      Head: Normocephalic and atraumatic.   Eyes:      Extraocular Movements: Extraocular movements intact.      Conjunctiva/sclera: Conjunctivae normal.      Pupils: Pupils are equal, round, and reactive to light.   Cardiovascular:      Rate and Rhythm: Normal rate and regular rhythm.      Heart sounds: Normal heart sounds.   Pulmonary:      Effort: Pulmonary effort is normal. No respiratory distress.      Breath sounds: Normal breath sounds. No wheezing, rhonchi or rales.   Musculoskeletal:         General: Normal range of motion.      Cervical back: Normal range of motion.   Skin:     General: Skin is warm and dry.   Neurological:      General: No focal deficit present.      Mental Status: She is alert and oriented to person, place, and time.   Psychiatric:         Mood and Affect: Mood normal.         Behavior: Behavior normal.        Office Visit on 11/03/2021   Component Date Value Ref Range Status    Sodium 11/03/2021 140  136 - 145 mmol/L  Final    Potassium 11/03/2021 4.2  3.5 - 5.1 mmol/L Final    Chloride 11/03/2021 106  98 - 107 mmol/L Final    CO2 11/03/2021 30  21 - 32 mmol/L Final    Anion Gap 11/03/2021 8  7 - 16 mmol/L Final    Glucose 11/03/2021 85  74 - 106 mg/dL Final    BUN 11/03/2021 12  7 - 18 mg/dL Final    Creatinine 11/03/2021 0.87  0.55 - 1.02 mg/dL Final    BUN/Creatinine Ratio 11/03/2021 14  6 - 20 Final    Calcium 11/03/2021 9.2  8.5 - 10.1 mg/dL Final    Total Protein 11/03/2021 6.7  6.4 - 8.2 g/dL Final    Albumin 11/03/2021 3.4* 3.5 - 5.0 g/dL Final    Globulin 11/03/2021 3.3  2.0 - 4.0 g/dL Final    A/G Ratio 11/03/2021 1.0   Final    Bilirubin, Total 11/03/2021 0.2  0.0 - 1.2 mg/dL Final    Alk Phos 11/03/2021 74  41 - 108 U/L Final    ALT 11/03/2021 19  13 - 56 U/L Final    AST 11/03/2021 23  15 - 37 U/L Final    eGFR 11/03/2021 73  >=60 mL/min/1.73m² Final    WBC 11/03/2021 6.66  4.50 - 11.00 K/uL Final    RBC 11/03/2021 4.59  4.20 - 5.40 M/uL Final    Hemoglobin 11/03/2021 13.9  12.0 - 16.0 g/dL Final    Hematocrit 11/03/2021 42.0  38.0 - 47.0 % Final    MCV 11/03/2021 91.5  80.0 - 96.0 fL Final    MCH 11/03/2021 30.3  27.0 - 31.0 pg Final    MCHC 11/03/2021 33.1  32.0 - 36.0 g/dL Final    RDW 11/03/2021 13.5  11.5 - 14.5 % Final    Platelet Count 11/03/2021 244  150 - 400 K/uL Final    MPV 11/03/2021 11.7  9.4 - 12.4 fL Final    Neutrophils % 11/03/2021 53.8  53.0 - 65.0 % Final    Lymphocytes % 11/03/2021 27.6  27.0 - 41.0 % Final    Monocytes % 11/03/2021 7.2* 2.0 - 6.0 % Final    Eosinophils % 11/03/2021 10.5* 1.0 - 4.0 % Final    Basophils % 11/03/2021 0.6  0.0 - 1.0 % Final    Immature Granulocytes % 11/03/2021 0.3  0.0 - 0.4 % Final    nRBC, Auto 11/03/2021 0.0  <=0.0 % Final    Neutrophils, Abs 11/03/2021 3.58  1.80 - 7.70 K/uL Final    Lymphocytes, Absolute 11/03/2021 1.84  1.00 - 4.80 K/uL Final    Monocytes, Absolute 11/03/2021 0.48  0.00 - 0.80 K/uL Final    Eosinophils,  Absolute 11/03/2021 0.70* 0.00 - 0.50 K/uL Final    Basophils, Absolute 11/03/2021 0.04  0.00 - 0.20 K/uL Final    Immature Granulocytes, Absolute 11/03/2021 0.02  0.00 - 0.04 K/uL Final    nRBC, Absolute 11/03/2021 0.00  <=0.00 x10e3/uL Final    Diff Type 11/03/2021 Auto   Final   Abstract on 11/02/2021   Component Date Value Ref Range Status    Cholesterol 09/04/2019 202* 0 - 200 mg/dL Final    Triglycerides 09/04/2019 101  40 - 160 mg/dL Final    HDL 09/04/2019 49  35 - 70 mg/dL Final    LDL Calculated 09/04/2019 133  0 - 160 MG/DL Final    HIV 09/11/2012 Negative   Final   No results displayed because visit has over 200 results.      Office Visit on 08/18/2021   Component Date Value Ref Range Status    SARS Coronavirus 2 Antigen 08/18/2021 Positive* Negative Final    Rapid Influenza A Ag 08/18/2021 Negative  Negative Final    Rapid Influenza B Ag 08/18/2021 Negative  Negative Final     Acceptable 08/18/2021 Yes   Final       Assessment and Plan (including Health Maintenance)      Problem List  Smart Sets  Document Outside HM   :    Plan: continue current care        Health Maintenance Due   Topic Date Due    Hepatitis C Screening  Never done    Mammogram  Never done       Problem List Items Addressed This Visit        Psychiatric    EAMON (generalized anxiety disorder) (Chronic)    Relevant Medications    ALPRAZolam (XANAX) 1 MG tablet    Recurrent major depressive disorder - Primary (Chronic)    Relevant Medications    FLUoxetine 40 MG capsule       Other    SOB (shortness of breath) on exertion    Relevant Medications    albuterol (PROVENTIL/VENTOLIN HFA) 90 mcg/actuation inhaler    COVID-19 long hauler      Other Visit Diagnoses     History of 2019 novel coronavirus disease (COVID-19)        Relevant Medications    albuterol (PROVENTIL/VENTOLIN HFA) 90 mcg/actuation inhaler          Health Maintenance Topics with due status: Not Due       Topic Last Completion Date    Lipid Panel  09/04/2019    Colorectal Cancer Screening 06/25/2021       No future appointments.         Signature:  MD PALOMO [facility]  MEDICAL GROUP [address] [address]  [phone]    Date of encounter: 2/17/22

## 2022-02-18 PROBLEM — R06.02 SOB (SHORTNESS OF BREATH) ON EXERTION: Status: ACTIVE | Noted: 2022-02-18

## 2022-02-18 PROBLEM — U09.9 COVID-19 LONG HAULER: Status: ACTIVE | Noted: 2022-02-18

## 2022-02-18 PROBLEM — F41.1 GAD (GENERALIZED ANXIETY DISORDER): Chronic | Status: ACTIVE | Noted: 2022-02-18

## 2022-02-18 PROBLEM — F33.9 RECURRENT MAJOR DEPRESSIVE DISORDER: Chronic | Status: ACTIVE | Noted: 2022-02-18

## 2022-03-11 ENCOUNTER — OFFICE VISIT (OUTPATIENT)
Dept: FAMILY MEDICINE | Facility: CLINIC | Age: 52
End: 2022-03-11
Payer: COMMERCIAL

## 2022-03-11 VITALS
WEIGHT: 145 LBS | DIASTOLIC BLOOD PRESSURE: 101 MMHG | SYSTOLIC BLOOD PRESSURE: 129 MMHG | HEIGHT: 63 IN | BODY MASS INDEX: 25.69 KG/M2 | HEART RATE: 88 BPM

## 2022-03-11 DIAGNOSIS — Z00.00 ROUTINE MEDICAL EXAM: ICD-10-CM

## 2022-03-11 DIAGNOSIS — Z13.220 SCREENING FOR HYPERLIPIDEMIA: ICD-10-CM

## 2022-03-11 DIAGNOSIS — Z13.1 SCREENING FOR DIABETES MELLITUS: Primary | ICD-10-CM

## 2022-03-11 LAB
CHOLEST SERPL-MCNC: 225 MG/DL (ref 0–200)
CHOLEST/HDLC SERPL: 5.1 {RATIO}
GLUCOSE SERPL-MCNC: 101 MG/DL (ref 74–106)
HDLC SERPL-MCNC: 44 MG/DL (ref 40–60)
LDLC SERPL CALC-MCNC: 142 MG/DL
LDLC/HDLC SERPL: 3.2 {RATIO}
NONHDLC SERPL-MCNC: 181 MG/DL
TRIGL SERPL-MCNC: 196 MG/DL (ref 35–150)
VLDLC SERPL-MCNC: 39 MG/DL

## 2022-03-11 PROCEDURE — 99396 PREV VISIT EST AGE 40-64: CPT | Mod: ,,, | Performed by: FAMILY MEDICINE

## 2022-03-11 PROCEDURE — 1159F MED LIST DOCD IN RCRD: CPT | Mod: ,,, | Performed by: FAMILY MEDICINE

## 2022-03-11 PROCEDURE — 1159F PR MEDICATION LIST DOCUMENTED IN MEDICAL RECORD: ICD-10-PCS | Mod: ,,, | Performed by: FAMILY MEDICINE

## 2022-03-11 PROCEDURE — 99396 PR PREVENTIVE VISIT,EST,40-64: ICD-10-PCS | Mod: ,,, | Performed by: FAMILY MEDICINE

## 2022-03-11 PROCEDURE — 3080F DIAST BP >= 90 MM HG: CPT | Mod: ,,, | Performed by: FAMILY MEDICINE

## 2022-03-11 PROCEDURE — 3008F PR BODY MASS INDEX (BMI) DOCUMENTED: ICD-10-PCS | Mod: ,,, | Performed by: FAMILY MEDICINE

## 2022-03-11 PROCEDURE — 80061 LIPID PANEL: CPT | Mod: ,,, | Performed by: CLINICAL MEDICAL LABORATORY

## 2022-03-11 PROCEDURE — 80061 LIPID PANEL: ICD-10-PCS | Mod: ,,, | Performed by: CLINICAL MEDICAL LABORATORY

## 2022-03-11 PROCEDURE — 82947 GLUCOSE, RANDOM: ICD-10-PCS | Mod: ,,, | Performed by: CLINICAL MEDICAL LABORATORY

## 2022-03-11 PROCEDURE — 3008F BODY MASS INDEX DOCD: CPT | Mod: ,,, | Performed by: FAMILY MEDICINE

## 2022-03-11 PROCEDURE — 1160F RVW MEDS BY RX/DR IN RCRD: CPT | Mod: ,,, | Performed by: FAMILY MEDICINE

## 2022-03-11 PROCEDURE — 3080F PR MOST RECENT DIASTOLIC BLOOD PRESSURE >= 90 MM HG: ICD-10-PCS | Mod: ,,, | Performed by: FAMILY MEDICINE

## 2022-03-11 PROCEDURE — 3074F PR MOST RECENT SYSTOLIC BLOOD PRESSURE < 130 MM HG: ICD-10-PCS | Mod: ,,, | Performed by: FAMILY MEDICINE

## 2022-03-11 PROCEDURE — 3074F SYST BP LT 130 MM HG: CPT | Mod: ,,, | Performed by: FAMILY MEDICINE

## 2022-03-11 PROCEDURE — 1160F PR REVIEW ALL MEDS BY PRESCRIBER/CLIN PHARMACIST DOCUMENTED: ICD-10-PCS | Mod: ,,, | Performed by: FAMILY MEDICINE

## 2022-03-11 PROCEDURE — 82947 ASSAY GLUCOSE BLOOD QUANT: CPT | Mod: ,,, | Performed by: CLINICAL MEDICAL LABORATORY

## 2022-03-11 RX ORDER — DEXLANSOPRAZOLE 60 MG/1
60 CAPSULE, DELAYED RELEASE ORAL DAILY
Qty: 30 CAPSULE | Refills: 5 | Status: SHIPPED | OUTPATIENT
Start: 2022-03-11 | End: 2022-05-20

## 2022-03-11 NOTE — PROGRESS NOTES
Heath Doshi MD   [facility]  MEDICAL GROUP [facility] - FAMILY MEDIC[facility]  [address] [place] [id number]  [phone]      PATIENT NAME: Merissa Ochoa  : 1970  DATE: 3/11/22  MRN: [id number]      Billing Provider: Heath Doshi MD  Level of Service:   Patient PCP Information       Provider PCP Type    Heath Doshi MD General            Reason for Visit / Chief Complaint: Other ([employer] healthy you)       Update PCP  Update Chief Complaint         History of Present Illness / Problem Focused Workflow     Merissa Ochoa presents to the clinic with Other ([employer] healthy you)       50 yo WF.  Is UTD on colonoscopy.  Needs mammogram done.  Has had hysterectomy.  Smokes 1/2 PPD.  Takes medication for MDD, EAMON and GERD.      Review of Systems     Review of Systems   Constitutional: Negative for activity change, chills and fever.   HENT: Negative for sore throat.    Eyes: Negative for pain.   Respiratory: Negative for cough, chest tightness and shortness of breath.    Cardiovascular: Negative for chest pain and palpitations.   Gastrointestinal: Positive for reflux. Negative for abdominal pain.   Neurological: Negative for dizziness, syncope and weakness.   Psychiatric/Behavioral: Negative for confusion.        Medical / Social / Family History     Past Medical History:   Diagnosis Date    Anxiety attack     Diverticula, colon 2021    Family history of colonic polyps 2021    Polyp of descending colon 2021    Polyp of transverse colon 2021    Positive colorectal cancer screening using Cologuard test 2021       Past Surgical History:   Procedure Laterality Date    CHOLECYSTECTOMY      HYSTERECTOMY      TUBAL LIGATION         Social History    reports that she has been smoking cigarettes. She has never used smokeless tobacco. She reports previous alcohol use. She reports previous drug use.   Social History     Tobacco Use    Smoking status:  Current Every Day Smoker     Types: Cigarettes    Smokeless tobacco: Never Used   Substance Use Topics    Alcohol use: Not Currently    Drug use: Not Currently       Family History  Family History   Problem Relation Age of Onset    Diabetes Mellitus Other     Heart disease Other     Hypertension Other     Mental illness Other     Ovarian cancer Other        Medications and Allergies     Medications  No outpatient medications have been marked as taking for the 3/11/22 encounter (Office Visit) with Heath Doshi MD.       Allergies  Review of patient's allergies indicates:   Allergen Reactions    Betadine [povidone-iodine]     Propranolol     Iodinated contrast media Nausea And Vomiting    Lexapro [escitalopram] Nausea And Vomiting       Physical Examination     Vitals:    03/11/22 0927   BP: (!) 129/101   Pulse: 88     Physical Exam  Vitals reviewed.   Constitutional:       Appearance: Normal appearance.   HENT:      Head: Normocephalic and atraumatic.   Eyes:      Extraocular Movements: Extraocular movements intact.      Conjunctiva/sclera: Conjunctivae normal.      Pupils: Pupils are equal, round, and reactive to light.   Cardiovascular:      Rate and Rhythm: Normal rate and regular rhythm.      Heart sounds: Normal heart sounds.   Pulmonary:      Effort: Pulmonary effort is normal.      Breath sounds: Normal breath sounds.   Abdominal:      General: Abdomen is flat. Bowel sounds are normal. There is no distension.      Palpations: Abdomen is soft. There is no mass.      Tenderness: There is no guarding or rebound.   Musculoskeletal:         General: Normal range of motion.      Cervical back: Normal range of motion.   Skin:     General: Skin is warm and dry.   Neurological:      General: No focal deficit present.      Mental Status: She is alert and oriented to person, place, and time.   Psychiatric:         Mood and Affect: Mood normal.         Behavior: Behavior normal.          Assessment and  Plan (including Health Maintenance)      Problem List  Smart Sets  Document Outside HM   :    Plan: Recommend healthy eating with low sodium, low saturated/trans fats, limit refined carbs and processed foods.  Get regular exercise and maintain healthy weight.  Stop smoking.  Compliance with all prescribed meds.   Attend all scheduled medical appointments.  Stay UTD with preventive procedures and vaccinations.    Pt louann info for walk-in mammogram clinic and she says that she will get procedure done.          Health Maintenance Due   Topic Date Due    Hepatitis C Screening  Never done    COVID-19 Vaccine (1) Never done    Mammogram  Never done    Pap Smear  Never done       Problem List Items Addressed This Visit    None     Visit Diagnoses     Screening for diabetes mellitus    -  Primary    Relevant Orders    Glucose, Random    Screening for hyperlipidemia        Relevant Orders    Lipid Panel    Routine medical exam        Relevant Orders    Lipid Panel    Glucose, Random          Health Maintenance Topics with due status: Not Due       Topic Last Completion Date    Lipid Panel 09/04/2019    Colorectal Cancer Screening 06/25/2021       Future Appointments   Date Time Provider Department Center   3/17/2023 10:00 AM Heath Doshi MD [facility]            Signature:  MD PALOMO [facility]  MEDICAL GROUP [facility] - [facility]ILY MEDIC[facility]  [address] [place] [address]  [phone]    Date of encounter: 3/11/22

## 2022-04-15 ENCOUNTER — HOSPITAL ENCOUNTER (EMERGENCY)
Facility: HOSPITAL | Age: 52
Discharge: HOME | End: 2022-04-15
Payer: COMMERCIAL

## 2022-04-15 VITALS
TEMPERATURE: 98 F | SYSTOLIC BLOOD PRESSURE: 118 MMHG | HEART RATE: 84 BPM | BODY MASS INDEX: 25.69 KG/M2 | RESPIRATION RATE: 16 BRPM | DIASTOLIC BLOOD PRESSURE: 74 MMHG | WEIGHT: 145 LBS | OXYGEN SATURATION: 96 %

## 2022-04-15 DIAGNOSIS — K57.30 DIVERTICULA, COLON: ICD-10-CM

## 2022-04-15 DIAGNOSIS — R06.02 SOB (SHORTNESS OF BREATH) ON EXERTION: ICD-10-CM

## 2022-04-15 DIAGNOSIS — Z83.719 FAMILY HISTORY OF COLONIC POLYPS: ICD-10-CM

## 2022-04-15 DIAGNOSIS — K63.5 POLYP OF TRANSVERSE COLON: ICD-10-CM

## 2022-04-15 DIAGNOSIS — R19.5 POSITIVE COLORECTAL CANCER SCREENING USING COLOGUARD TEST: ICD-10-CM

## 2022-04-15 DIAGNOSIS — J06.9 URI WITH COUGH AND CONGESTION: Primary | ICD-10-CM

## 2022-04-15 DIAGNOSIS — F41.9 ANXIETY: ICD-10-CM

## 2022-04-15 DIAGNOSIS — J32.9 SINUSITIS, UNSPECIFIED CHRONICITY, UNSPECIFIED LOCATION: ICD-10-CM

## 2022-04-15 DIAGNOSIS — F41.1 GAD (GENERALIZED ANXIETY DISORDER): Chronic | ICD-10-CM

## 2022-04-15 DIAGNOSIS — F33.9 RECURRENT MAJOR DEPRESSIVE DISORDER: Chronic | ICD-10-CM

## 2022-04-15 DIAGNOSIS — K63.5 POLYP OF DESCENDING COLON: ICD-10-CM

## 2022-04-15 DIAGNOSIS — F17.200 CURRENT EVERY DAY SMOKER: ICD-10-CM

## 2022-04-15 DIAGNOSIS — U07.1 COVID-19 VIRUS INFECTION: ICD-10-CM

## 2022-04-15 DIAGNOSIS — Z79.899 DVT PROPHYLAXIS: ICD-10-CM

## 2022-04-15 DIAGNOSIS — U09.9 COVID-19 LONG HAULER: ICD-10-CM

## 2022-04-15 PROCEDURE — 99284 EMERGENCY DEPT VISIT MOD MDM: CPT

## 2022-04-15 PROCEDURE — 96372 THER/PROPH/DIAG INJ SC/IM: CPT

## 2022-04-15 PROCEDURE — 99283 EMERGENCY DEPT VISIT LOW MDM: CPT | Mod: ,,, | Performed by: NURSE PRACTITIONER

## 2022-04-15 PROCEDURE — 99283 PR EMERGENCY DEPT VISIT,LEVEL III: ICD-10-PCS | Mod: ,,, | Performed by: NURSE PRACTITIONER

## 2022-04-15 PROCEDURE — 63600175 PHARM REV CODE 636 W HCPCS: Performed by: NURSE PRACTITIONER

## 2022-04-15 RX ORDER — DEXAMETHASONE SODIUM PHOSPHATE 4 MG/ML
8 INJECTION, SOLUTION INTRA-ARTICULAR; INTRALESIONAL; INTRAMUSCULAR; INTRAVENOUS; SOFT TISSUE
Status: COMPLETED | OUTPATIENT
Start: 2022-04-15 | End: 2022-04-15

## 2022-04-15 RX ORDER — AZITHROMYCIN 500 MG/1
500 TABLET, FILM COATED ORAL DAILY
Qty: 3 TABLET | Refills: 0 | Status: SHIPPED | OUTPATIENT
Start: 2022-04-15 | End: 2022-05-20 | Stop reason: ALTCHOICE

## 2022-04-15 RX ADMIN — DEXAMETHASONE SODIUM PHOSPHATE 8 MG: 4 INJECTION, SOLUTION INTRAMUSCULAR; INTRAVENOUS at 09:04

## 2022-04-16 NOTE — DISCHARGE INSTRUCTIONS
Followup with primary care in 2 days  Return to the emergency department for any new or worsening symptoms

## 2022-04-16 NOTE — ED PROVIDER NOTES
Encounter Date: 4/15/2022       History     Chief Complaint   Patient presents with    URI     Cold s/s x 2 days      52 y/o WF presents to the ED for c/o sinus congestion, more on the right which she reports has been ongoing for 2 weeks.  Denies fever, is able to drink fluids.  Dry cough, but not productive.  She is taking OTC zyrtec, but no decongestant.  Has had nausea and upset stomach with symptoms too.        Review of patient's allergies indicates:   Allergen Reactions    Betadine [povidone-iodine]     Propranolol     Iodinated contrast media Nausea And Vomiting    Lexapro [escitalopram] Nausea And Vomiting     Past Medical History:   Diagnosis Date    Anxiety attack     Diverticula, colon 6/25/2021    Family history of colonic polyps 6/25/2021    Polyp of descending colon 6/25/2021    Polyp of transverse colon 6/25/2021    Positive colorectal cancer screening using Cologuard test 6/25/2021     Past Surgical History:   Procedure Laterality Date    CHOLECYSTECTOMY      HYSTERECTOMY      TUBAL LIGATION       Family History   Problem Relation Age of Onset    Diabetes Mellitus Other     Heart disease Other     Hypertension Other     Mental illness Other     Ovarian cancer Other      Social History     Tobacco Use    Smoking status: Current Every Day Smoker     Types: Cigarettes    Smokeless tobacco: Never Used   Substance Use Topics    Alcohol use: Not Currently    Drug use: Not Currently     Review of Systems   Constitutional: Negative for fever.   HENT: Positive for congestion, facial swelling, postnasal drip, rhinorrhea, sinus pressure and sinus pain. Negative for ear pain, nosebleeds and sore throat.    Respiratory: Negative for shortness of breath.    Cardiovascular: Negative for chest pain.   Gastrointestinal: Negative for nausea.   Genitourinary: Negative for dysuria.   Musculoskeletal: Negative for back pain.   Skin: Negative for rash.   Neurological: Negative for weakness.    Hematological: Does not bruise/bleed easily.   All other systems reviewed and are negative.      Physical Exam     Initial Vitals [04/15/22 2112]   BP Pulse Resp Temp SpO2   118/74 84 16 98.1 °F (36.7 °C) 96 %      MAP       --         Physical Exam    Nursing note and vitals reviewed.  Constitutional: She appears well-developed and well-nourished.   HENT:   Head: Normocephalic.   Nose: Rhinorrhea and sinus tenderness present. No mucosal edema, nose lacerations, nasal deformity, septal deviation or nasal septal hematoma. No epistaxis.  No foreign bodies. Right sinus exhibits maxillary sinus tenderness and frontal sinus tenderness.   Eyes: EOM are normal. Pupils are equal, round, and reactive to light.   Neck: Neck supple.   Cardiovascular: Normal rate, regular rhythm, normal heart sounds and intact distal pulses.   Pulmonary/Chest: Breath sounds normal. She has no wheezes.   Abdominal: Abdomen is soft. Bowel sounds are normal. She exhibits no mass. There is no abdominal tenderness (generalized). There is no rebound and no guarding.   Musculoskeletal:         General: No tenderness. Normal range of motion.      Cervical back: Neck supple.     Neurological: She is alert and oriented to person, place, and time. She has normal strength and normal reflexes. GCS score is 15. GCS eye subscore is 4. GCS verbal subscore is 5. GCS motor subscore is 6.   Skin: Skin is warm and dry.   Psychiatric: She has a normal mood and affect.         Medical Screening Exam   See Full Note    ED Course   Procedures  Labs Reviewed - No data to display       Imaging Results    None          Medications   dexamethasone injection 8 mg (has no administration in time range)                       Clinical Impression:   Final diagnoses:  [J06.9] URI with cough and congestion (Primary)  [J32.9] Sinusitis, unspecified chronicity, unspecified location          ED Disposition Condition    Discharge Stable        ED Prescriptions     Medication Sig  Dispense Start Date End Date Auth. Provider    azithromycin (ZITHROMAX) 500 MG tablet Take 1 tablet (500 mg total) by mouth once daily. 3 tablet 4/15/2022  ROSALIE Juan        Follow-up Information    None          ROSALIE Juan  04/15/22 2121

## 2022-04-18 ENCOUNTER — HOSPITAL ENCOUNTER (EMERGENCY)
Facility: HOSPITAL | Age: 52
Discharge: HOME | End: 2022-04-18
Attending: EMERGENCY MEDICINE
Payer: COMMERCIAL

## 2022-04-18 VITALS
WEIGHT: 145 LBS | BODY MASS INDEX: 24.16 KG/M2 | DIASTOLIC BLOOD PRESSURE: 96 MMHG | OXYGEN SATURATION: 97 % | SYSTOLIC BLOOD PRESSURE: 123 MMHG | HEIGHT: 65 IN | TEMPERATURE: 98 F | HEART RATE: 77 BPM | RESPIRATION RATE: 18 BRPM

## 2022-04-18 DIAGNOSIS — F17.200 CURRENT EVERY DAY SMOKER: ICD-10-CM

## 2022-04-18 DIAGNOSIS — F33.9 RECURRENT MAJOR DEPRESSIVE DISORDER: Chronic | ICD-10-CM

## 2022-04-18 DIAGNOSIS — Z83.719 FAMILY HISTORY OF COLONIC POLYPS: ICD-10-CM

## 2022-04-18 DIAGNOSIS — K57.30 DIVERTICULA, COLON: ICD-10-CM

## 2022-04-18 DIAGNOSIS — R19.5 POSITIVE COLORECTAL CANCER SCREENING USING COLOGUARD TEST: ICD-10-CM

## 2022-04-18 DIAGNOSIS — K63.5 POLYP OF DESCENDING COLON: ICD-10-CM

## 2022-04-18 DIAGNOSIS — K04.7 DENTAL ABSCESS: Primary | ICD-10-CM

## 2022-04-18 DIAGNOSIS — U09.9 COVID-19 LONG HAULER: ICD-10-CM

## 2022-04-18 DIAGNOSIS — R06.02 SOB (SHORTNESS OF BREATH) ON EXERTION: ICD-10-CM

## 2022-04-18 DIAGNOSIS — F41.9 ANXIETY: ICD-10-CM

## 2022-04-18 DIAGNOSIS — U07.1 COVID-19 VIRUS INFECTION: ICD-10-CM

## 2022-04-18 DIAGNOSIS — Z79.899 DVT PROPHYLAXIS: ICD-10-CM

## 2022-04-18 DIAGNOSIS — K63.5 POLYP OF TRANSVERSE COLON: ICD-10-CM

## 2022-04-18 DIAGNOSIS — F41.1 GAD (GENERALIZED ANXIETY DISORDER): Chronic | ICD-10-CM

## 2022-04-18 PROCEDURE — 99284 EMERGENCY DEPT VISIT MOD MDM: CPT | Mod: 25

## 2022-04-18 PROCEDURE — 96365 THER/PROPH/DIAG IV INF INIT: CPT

## 2022-04-18 PROCEDURE — 99283 PR EMERGENCY DEPT VISIT,LEVEL III: ICD-10-PCS | Mod: ,,, | Performed by: EMERGENCY MEDICINE

## 2022-04-18 PROCEDURE — 63600175 PHARM REV CODE 636 W HCPCS: Performed by: EMERGENCY MEDICINE

## 2022-04-18 PROCEDURE — 96372 THER/PROPH/DIAG INJ SC/IM: CPT | Mod: 59

## 2022-04-18 PROCEDURE — 99283 EMERGENCY DEPT VISIT LOW MDM: CPT | Mod: ,,, | Performed by: EMERGENCY MEDICINE

## 2022-04-18 PROCEDURE — 25000003 PHARM REV CODE 250: Performed by: EMERGENCY MEDICINE

## 2022-04-18 RX ORDER — KETOROLAC TROMETHAMINE 30 MG/ML
30 INJECTION, SOLUTION INTRAMUSCULAR; INTRAVENOUS
Status: COMPLETED | OUTPATIENT
Start: 2022-04-18 | End: 2022-04-18

## 2022-04-18 RX ORDER — CLINDAMYCIN PHOSPHATE 600 MG/50ML
600 INJECTION, SOLUTION INTRAVENOUS
Status: COMPLETED | OUTPATIENT
Start: 2022-04-18 | End: 2022-04-18

## 2022-04-18 RX ADMIN — KETOROLAC TROMETHAMINE 30 MG: 30 INJECTION, SOLUTION INTRAMUSCULAR at 04:04

## 2022-04-18 RX ADMIN — CLINDAMYCIN IN 5 PERCENT DEXTROSE 600 MG: 12 INJECTION, SOLUTION INTRAVENOUS at 04:04

## 2022-04-18 NOTE — ED PROVIDER NOTES
Encounter Date: 4/18/2022       History     Chief Complaint   Patient presents with    Dental Pain     Facial and teeth pain for 4 days     52 y/o female with right facial swelling due to dental abscess.  Pain started 2 days ago.  Pain is moderate.  Pain is worse with palpation.          Review of patient's allergies indicates:   Allergen Reactions    Betadine [povidone-iodine]     Propranolol     Iodinated contrast media Nausea And Vomiting    Lexapro [escitalopram] Nausea And Vomiting     Past Medical History:   Diagnosis Date    Anxiety attack     Diverticula, colon 6/25/2021    Family history of colonic polyps 6/25/2021    Polyp of descending colon 6/25/2021    Polyp of transverse colon 6/25/2021    Positive colorectal cancer screening using Cologuard test 6/25/2021     Past Surgical History:   Procedure Laterality Date    CHOLECYSTECTOMY      HYSTERECTOMY      TUBAL LIGATION       Family History   Problem Relation Age of Onset    Diabetes Mellitus Other     Heart disease Other     Hypertension Other     Mental illness Other     Ovarian cancer Other      Social History     Tobacco Use    Smoking status: Current Every Day Smoker     Types: Cigarettes    Smokeless tobacco: Never Used   Substance Use Topics    Alcohol use: Not Currently    Drug use: Not Currently     Review of Systems   All other systems reviewed and are negative.      Physical Exam     Initial Vitals [04/18/22 0312]   BP Pulse Resp Temp SpO2   (!) 123/96 77 18 98.3 °F (36.8 °C) 97 %      MAP       --         Physical Exam    Nursing note and vitals reviewed.  Constitutional: She appears well-developed and well-nourished.   HENT:   Head: Normocephalic and atraumatic.   Nose: Nose normal.   Mouth/Throat: Oropharynx is clear and moist.   Right upper gingival swelling and erythema.  Right facial swelling.     Eyes: Conjunctivae and EOM are normal. Pupils are equal, round, and reactive to light.   Neck: Neck supple.   Normal  range of motion.  Cardiovascular: Normal rate, regular rhythm, normal heart sounds and intact distal pulses.   Pulmonary/Chest: Breath sounds normal.   Abdominal: Abdomen is soft. Bowel sounds are normal.   Musculoskeletal:         General: Normal range of motion.      Cervical back: Normal range of motion and neck supple.     Neurological: She is alert and oriented to person, place, and time. She has normal strength. GCS score is 15. GCS eye subscore is 4. GCS verbal subscore is 5. GCS motor subscore is 6.   Skin: Skin is warm and dry. Capillary refill takes less than 2 seconds.         Medical Screening Exam   See Full Note    ED Course   Procedures  Labs Reviewed - No data to display       Imaging Results    None          Medications   clindamycin in D5W 600 mg/50 mL IVPB 600 mg (has no administration in time range)                       Clinical Impression:   Final diagnoses:  [K04.7] Dental abscess (Primary)          ED Disposition Condition    Discharge Stable        ED Prescriptions     None        Follow-up Information     Follow up With Specialties Details Why Contact Info    Heath Doshi MD Family Medicine  As needed [address] [facility] [address]  [phone]             Andrea Saucedo MD  04/18/22 8228

## 2022-04-19 ENCOUNTER — OFFICE VISIT (OUTPATIENT)
Dept: FAMILY MEDICINE | Facility: CLINIC | Age: 52
End: 2022-04-19
Payer: COMMERCIAL

## 2022-04-19 VITALS
OXYGEN SATURATION: 99 % | TEMPERATURE: 98 F | HEIGHT: 63 IN | SYSTOLIC BLOOD PRESSURE: 114 MMHG | HEART RATE: 84 BPM | RESPIRATION RATE: 20 BRPM | WEIGHT: 158 LBS | BODY MASS INDEX: 28 KG/M2 | DIASTOLIC BLOOD PRESSURE: 97 MMHG

## 2022-04-19 DIAGNOSIS — K04.7 DENTAL ABSCESS: Primary | ICD-10-CM

## 2022-04-19 PROCEDURE — 96372 THER/PROPH/DIAG INJ SC/IM: CPT | Mod: ,,, | Performed by: FAMILY MEDICINE

## 2022-04-19 PROCEDURE — 3008F PR BODY MASS INDEX (BMI) DOCUMENTED: ICD-10-PCS | Mod: ,,, | Performed by: FAMILY MEDICINE

## 2022-04-19 PROCEDURE — 99213 PR OFFICE/OUTPT VISIT, EST, LEVL III, 20-29 MIN: ICD-10-PCS | Mod: 25,,, | Performed by: FAMILY MEDICINE

## 2022-04-19 PROCEDURE — 1159F PR MEDICATION LIST DOCUMENTED IN MEDICAL RECORD: ICD-10-PCS | Mod: ,,, | Performed by: FAMILY MEDICINE

## 2022-04-19 PROCEDURE — 3080F PR MOST RECENT DIASTOLIC BLOOD PRESSURE >= 90 MM HG: ICD-10-PCS | Mod: ,,, | Performed by: FAMILY MEDICINE

## 2022-04-19 PROCEDURE — 3080F DIAST BP >= 90 MM HG: CPT | Mod: ,,, | Performed by: FAMILY MEDICINE

## 2022-04-19 PROCEDURE — 1159F MED LIST DOCD IN RCRD: CPT | Mod: ,,, | Performed by: FAMILY MEDICINE

## 2022-04-19 PROCEDURE — 96372 PR INJECTION,THERAP/PROPH/DIAG2ST, IM OR SUBCUT: ICD-10-PCS | Mod: ,,, | Performed by: FAMILY MEDICINE

## 2022-04-19 PROCEDURE — 3074F PR MOST RECENT SYSTOLIC BLOOD PRESSURE < 130 MM HG: ICD-10-PCS | Mod: ,,, | Performed by: FAMILY MEDICINE

## 2022-04-19 PROCEDURE — 3074F SYST BP LT 130 MM HG: CPT | Mod: ,,, | Performed by: FAMILY MEDICINE

## 2022-04-19 PROCEDURE — 99213 OFFICE O/P EST LOW 20 MIN: CPT | Mod: 25,,, | Performed by: FAMILY MEDICINE

## 2022-04-19 PROCEDURE — 3008F BODY MASS INDEX DOCD: CPT | Mod: ,,, | Performed by: FAMILY MEDICINE

## 2022-04-19 RX ORDER — CEFTRIAXONE 500 MG/1
500 INJECTION, POWDER, FOR SOLUTION INTRAMUSCULAR; INTRAVENOUS
Status: COMPLETED | OUTPATIENT
Start: 2022-04-19 | End: 2022-04-19

## 2022-04-19 RX ADMIN — CEFTRIAXONE 500 MG: 500 INJECTION, POWDER, FOR SOLUTION INTRAMUSCULAR; INTRAVENOUS at 11:04

## 2022-04-19 NOTE — PROGRESS NOTES
Subjective:       Patient ID: Merissa Ochoa is a 51 y.o. female.    Chief Complaint: Requests Rocephin injection per dentist referral    Patient just saw her dentist who referred her here for a Rocephin injection.  She is scheduled to see an oral surgeon tomorrow.  She has severe caries of right upper canine and.  And has an abscess.    Review of Systems      Objective:      Physical Exam  Constitutional:       General: She is in acute distress.      Appearance: She is not ill-appearing.   HENT:      Mouth/Throat:      Dentition: Dental caries and dental abscesses present.        Comments: Severe caries  Cardiovascular:      Rate and Rhythm: Normal rate and regular rhythm.   Pulmonary:      Effort: Pulmonary effort is normal.      Breath sounds: Normal breath sounds.   Skin:     General: Skin is warm and dry.      Findings: No lesion or rash.   Neurological:      Mental Status: She is alert.         Assessment:       Problem List Items Addressed This Visit    None     Visit Diagnoses     Dental abscess    -  Primary          Plan:     Rocephin 500 mg IM.  Follow-up with oral surgeon as already scheduled

## 2022-05-20 ENCOUNTER — OFFICE VISIT (OUTPATIENT)
Dept: FAMILY MEDICINE | Facility: CLINIC | Age: 52
End: 2022-05-20
Payer: COMMERCIAL

## 2022-05-20 VITALS
BODY MASS INDEX: 28 KG/M2 | WEIGHT: 158 LBS | DIASTOLIC BLOOD PRESSURE: 72 MMHG | SYSTOLIC BLOOD PRESSURE: 122 MMHG | HEIGHT: 63 IN

## 2022-05-20 DIAGNOSIS — F41.1 GAD (GENERALIZED ANXIETY DISORDER): Chronic | ICD-10-CM

## 2022-05-20 DIAGNOSIS — R11.0 NAUSEA: Primary | ICD-10-CM

## 2022-05-20 DIAGNOSIS — F33.9 RECURRENT MAJOR DEPRESSIVE DISORDER, REMISSION STATUS UNSPECIFIED: Chronic | ICD-10-CM

## 2022-05-20 DIAGNOSIS — Z86.16 HISTORY OF 2019 NOVEL CORONAVIRUS DISEASE (COVID-19): ICD-10-CM

## 2022-05-20 DIAGNOSIS — R06.02 SOB (SHORTNESS OF BREATH) ON EXERTION: ICD-10-CM

## 2022-05-20 PROCEDURE — 3078F DIAST BP <80 MM HG: CPT | Mod: ,,, | Performed by: FAMILY MEDICINE

## 2022-05-20 PROCEDURE — 3008F PR BODY MASS INDEX (BMI) DOCUMENTED: ICD-10-PCS | Mod: ,,, | Performed by: FAMILY MEDICINE

## 2022-05-20 PROCEDURE — 3008F BODY MASS INDEX DOCD: CPT | Mod: ,,, | Performed by: FAMILY MEDICINE

## 2022-05-20 PROCEDURE — 99214 OFFICE O/P EST MOD 30 MIN: CPT | Mod: ,,, | Performed by: FAMILY MEDICINE

## 2022-05-20 PROCEDURE — 3074F PR MOST RECENT SYSTOLIC BLOOD PRESSURE < 130 MM HG: ICD-10-PCS | Mod: ,,, | Performed by: FAMILY MEDICINE

## 2022-05-20 PROCEDURE — 3078F PR MOST RECENT DIASTOLIC BLOOD PRESSURE < 80 MM HG: ICD-10-PCS | Mod: ,,, | Performed by: FAMILY MEDICINE

## 2022-05-20 PROCEDURE — 3074F SYST BP LT 130 MM HG: CPT | Mod: ,,, | Performed by: FAMILY MEDICINE

## 2022-05-20 PROCEDURE — 99214 PR OFFICE/OUTPT VISIT, EST, LEVL IV, 30-39 MIN: ICD-10-PCS | Mod: ,,, | Performed by: FAMILY MEDICINE

## 2022-05-20 PROCEDURE — 1159F PR MEDICATION LIST DOCUMENTED IN MEDICAL RECORD: ICD-10-PCS | Mod: ,,, | Performed by: FAMILY MEDICINE

## 2022-05-20 PROCEDURE — 1159F MED LIST DOCD IN RCRD: CPT | Mod: ,,, | Performed by: FAMILY MEDICINE

## 2022-05-20 PROCEDURE — 1160F RVW MEDS BY RX/DR IN RCRD: CPT | Mod: ,,, | Performed by: FAMILY MEDICINE

## 2022-05-20 PROCEDURE — 1160F PR REVIEW ALL MEDS BY PRESCRIBER/CLIN PHARMACIST DOCUMENTED: ICD-10-PCS | Mod: ,,, | Performed by: FAMILY MEDICINE

## 2022-05-20 RX ORDER — FLUOXETINE HYDROCHLORIDE 40 MG/1
40 CAPSULE ORAL DAILY
Qty: 90 CAPSULE | Refills: 1 | Status: SHIPPED | OUTPATIENT
Start: 2022-05-20 | End: 2022-08-18 | Stop reason: SDUPTHER

## 2022-05-20 RX ORDER — ALBUTEROL SULFATE 90 UG/1
2 AEROSOL, METERED RESPIRATORY (INHALATION) EVERY 6 HOURS
Qty: 8 G | Refills: 5 | Status: SHIPPED | OUTPATIENT
Start: 2022-05-20 | End: 2022-09-27 | Stop reason: SDUPTHER

## 2022-05-20 RX ORDER — ONDANSETRON 4 MG/1
4 TABLET, FILM COATED ORAL EVERY 8 HOURS PRN
Qty: 21 TABLET | Refills: 1 | Status: SHIPPED | OUTPATIENT
Start: 2022-05-20 | End: 2022-09-27 | Stop reason: ALTCHOICE

## 2022-05-20 RX ORDER — ALPRAZOLAM 1 MG/1
1 TABLET ORAL 3 TIMES DAILY
Qty: 90 TABLET | Refills: 2 | Status: SHIPPED | OUTPATIENT
Start: 2022-05-20 | End: 2022-07-22

## 2022-05-20 NOTE — PROGRESS NOTES
Heath Doshi MD   [facility]  MEDICAL GROUP [facility]  [address] [place] [id number]  [phone]      PATIENT NAME: Merissa Ochoa  : 1970  DATE: 22  MRN: [id number]      Billing Provider: Heath Doshi MD  Level of Service:   Patient PCP Information       Provider PCP Type    Heath Doshi MD General            Reason for Visit / Chief Complaint: Follow-up (Follow-up. Medication refills. )       Update PCP  Update Chief Complaint         History of Present Illness / Problem Focused Workflow     Merissa Ochoa presents to the clinic with Follow-up (Follow-up. Medication refills. )       52 yo WF here for med refills.  Is treated for MDD, EAMON, HTN, long COVID-19 c SOB.  Needs medication for nausea.    Follow-up  Pertinent negatives include no abdominal pain, chest pain, chills, coughing, fever, sore throat or weakness.       Review of Systems     Review of Systems   Constitutional: Negative for activity change, chills and fever.   HENT: Negative for sore throat.    Eyes: Negative for pain.   Respiratory: Positive for shortness of breath. Negative for cough and chest tightness.    Cardiovascular: Negative for chest pain and palpitations.   Gastrointestinal: Negative for abdominal pain.   Neurological: Negative for dizziness, syncope and weakness.   Psychiatric/Behavioral: Negative for confusion.        Medical / Social / Family History     Past Medical History:   Diagnosis Date    Anxiety attack     Diverticula, colon 2021    Family history of colonic polyps 2021    Polyp of descending colon 2021    Polyp of transverse colon 2021    Positive colorectal cancer screening using Cologuard test 2021       Past Surgical History:   Procedure Laterality Date    CHOLECYSTECTOMY      HYSTERECTOMY      TUBAL LIGATION         Social History    reports that she has been smoking cigarettes. She has never used smokeless  tobacco. She reports previous alcohol use. She reports previous drug use.   Social History     Tobacco Use    Smoking status: Current Every Day Smoker     Types: Cigarettes    Smokeless tobacco: Never Used   Substance Use Topics    Alcohol use: Not Currently    Drug use: Not Currently       Family History  Family History   Problem Relation Age of Onset    Diabetes Mellitus Other     Heart disease Other     Hypertension Other     Mental illness Other     Ovarian cancer Other        Medications and Allergies     Medications  Outpatient Medications Marked as Taking for the 5/20/22 encounter (Office Visit) with Heath Doshi MD   Medication Sig Dispense Refill    famotidine (PEPCID) 20 MG tablet Take 1 tablet (20 mg total) by mouth 2 (two) times daily. 60 tablet 11    [DISCONTINUED] albuterol (PROVENTIL/VENTOLIN HFA) 90 mcg/actuation inhaler Inhale 2 puffs into the lungs every 6 (six) hours. Rescue 8 g 5    [DISCONTINUED] ALPRAZolam (XANAX) 1 MG tablet TAKE 1 TABLET BY MOUTH 3 TIMES DAILY AS NEEDED FOR ANXIETY *CAUSES DROWSINESS-AVOID ALCOHOL!! 90 tablet 2    [DISCONTINUED] FLUoxetine 40 MG capsule Take 1 capsule (40 mg total) by mouth once daily. 90 capsule 1    [DISCONTINUED] ondansetron (ZOFRAN) 4 MG tablet Take 1 tablet (4 mg total) by mouth every 8 (eight) hours as needed for Nausea. 21 tablet 1       Allergies  Review of patient's allergies indicates:   Allergen Reactions    Betadine [povidone-iodine]     Propranolol     Iodinated contrast media Nausea And Vomiting    Lexapro [escitalopram] Nausea And Vomiting       Physical Examination     Vitals:    05/20/22 1416   BP: 122/72     Physical Exam  Vitals reviewed.   Constitutional:       Appearance: Normal appearance.   HENT:      Head: Normocephalic and atraumatic.   Eyes:      Extraocular Movements: Extraocular movements intact.      Conjunctiva/sclera: Conjunctivae normal.      Pupils: Pupils are equal, round, and reactive to light.    Cardiovascular:      Rate and Rhythm: Normal rate and regular rhythm.      Heart sounds: Normal heart sounds.   Pulmonary:      Effort: Pulmonary effort is normal.      Breath sounds: Normal breath sounds. No wheezing, rhonchi or rales.   Musculoskeletal:         General: Normal range of motion.      Cervical back: Normal range of motion.   Skin:     General: Skin is warm and dry.   Neurological:      General: No focal deficit present.      Mental Status: She is alert and oriented to person, place, and time.   Psychiatric:         Mood and Affect: Mood normal.         Behavior: Behavior normal.          Assessment and Plan (including Health Maintenance)      Problem List  Smart Sets  Document Outside HM   :    Plan:         Health Maintenance Due   Topic Date Due    Hepatitis C Screening  Never done    COVID-19 Vaccine (1) Never done    Mammogram  Never done       Problem List Items Addressed This Visit        Psychiatric    EAMON (generalized anxiety disorder) (Chronic)    Relevant Medications    ALPRAZolam (XANAX) 1 MG tablet    Recurrent major depressive disorder (Chronic)    Relevant Medications    FLUoxetine 40 MG capsule       Cardiac/Vascular    SOB (shortness of breath) on exertion    Relevant Medications    albuterol (PROVENTIL/VENTOLIN HFA) 90 mcg/actuation inhaler      Other Visit Diagnoses     Nausea    -  Primary    Relevant Medications    ondansetron (ZOFRAN) 4 MG tablet    History of 2019 novel coronavirus disease (COVID-19)        Relevant Medications    albuterol (PROVENTIL/VENTOLIN HFA) 90 mcg/actuation inhaler          Health Maintenance Topics with due status: Not Due       Topic Last Completion Date    Colorectal Cancer Screening 06/25/2021    Lipid Panel 03/11/2022    Influenza Vaccine Not Due       Future Appointments   Date Time Provider Department Center   7/14/2022  9:00 AM Heath Doshi MD [facility]   11/17/2022  9:00 AM Heath Doshi MD [facility]    3/17/2023 10:00 AM Heath Doshi MD [facility]            Signature:  MD PALOMO [facility]  MEDICAL GROUP [facility]  [address] [place] [address] [place] [address]  [phone]    Date of encounter: 5/20/22

## 2022-08-03 ENCOUNTER — OFFICE VISIT (OUTPATIENT)
Dept: FAMILY MEDICINE | Facility: CLINIC | Age: 52
End: 2022-08-03
Payer: COMMERCIAL

## 2022-08-03 VITALS
DIASTOLIC BLOOD PRESSURE: 94 MMHG | RESPIRATION RATE: 18 BRPM | WEIGHT: 158 LBS | TEMPERATURE: 98 F | BODY MASS INDEX: 28 KG/M2 | OXYGEN SATURATION: 98 % | HEIGHT: 63 IN | HEART RATE: 71 BPM | SYSTOLIC BLOOD PRESSURE: 129 MMHG

## 2022-08-03 DIAGNOSIS — J40 BRONCHITIS: Primary | ICD-10-CM

## 2022-08-03 DIAGNOSIS — J32.9 SINUSITIS, UNSPECIFIED CHRONICITY, UNSPECIFIED LOCATION: ICD-10-CM

## 2022-08-03 DIAGNOSIS — Z11.52 ENCOUNTER FOR SCREENING LABORATORY TESTING FOR COVID-19 VIRUS: ICD-10-CM

## 2022-08-03 LAB
CTP QC/QA: YES
SARS-COV-2 AG RESP QL IA.RAPID: NEGATIVE

## 2022-08-03 PROCEDURE — 3074F PR MOST RECENT SYSTOLIC BLOOD PRESSURE < 130 MM HG: ICD-10-PCS | Mod: ,,, | Performed by: FAMILY MEDICINE

## 2022-08-03 PROCEDURE — 3008F BODY MASS INDEX DOCD: CPT | Mod: ,,, | Performed by: FAMILY MEDICINE

## 2022-08-03 PROCEDURE — 3080F DIAST BP >= 90 MM HG: CPT | Mod: ,,, | Performed by: FAMILY MEDICINE

## 2022-08-03 PROCEDURE — 99214 OFFICE O/P EST MOD 30 MIN: CPT | Mod: 25,,, | Performed by: FAMILY MEDICINE

## 2022-08-03 PROCEDURE — 96372 PR INJECTION,THERAP/PROPH/DIAG2ST, IM OR SUBCUT: ICD-10-PCS | Mod: ,,, | Performed by: FAMILY MEDICINE

## 2022-08-03 PROCEDURE — 1159F MED LIST DOCD IN RCRD: CPT | Mod: ,,, | Performed by: FAMILY MEDICINE

## 2022-08-03 PROCEDURE — 1159F PR MEDICATION LIST DOCUMENTED IN MEDICAL RECORD: ICD-10-PCS | Mod: ,,, | Performed by: FAMILY MEDICINE

## 2022-08-03 PROCEDURE — 96372 THER/PROPH/DIAG INJ SC/IM: CPT | Mod: ,,, | Performed by: FAMILY MEDICINE

## 2022-08-03 PROCEDURE — 87426 SARS CORONAVIRUS 2 ANTIGEN POCT: ICD-10-PCS | Mod: QW,,, | Performed by: FAMILY MEDICINE

## 2022-08-03 PROCEDURE — 87426 SARSCOV CORONAVIRUS AG IA: CPT | Mod: QW,,, | Performed by: FAMILY MEDICINE

## 2022-08-03 PROCEDURE — 1160F PR REVIEW ALL MEDS BY PRESCRIBER/CLIN PHARMACIST DOCUMENTED: ICD-10-PCS | Mod: ,,, | Performed by: FAMILY MEDICINE

## 2022-08-03 PROCEDURE — 99214 PR OFFICE/OUTPT VISIT, EST, LEVL IV, 30-39 MIN: ICD-10-PCS | Mod: 25,,, | Performed by: FAMILY MEDICINE

## 2022-08-03 PROCEDURE — 1160F RVW MEDS BY RX/DR IN RCRD: CPT | Mod: ,,, | Performed by: FAMILY MEDICINE

## 2022-08-03 PROCEDURE — 3074F SYST BP LT 130 MM HG: CPT | Mod: ,,, | Performed by: FAMILY MEDICINE

## 2022-08-03 PROCEDURE — 3080F PR MOST RECENT DIASTOLIC BLOOD PRESSURE >= 90 MM HG: ICD-10-PCS | Mod: ,,, | Performed by: FAMILY MEDICINE

## 2022-08-03 PROCEDURE — 3008F PR BODY MASS INDEX (BMI) DOCUMENTED: ICD-10-PCS | Mod: ,,, | Performed by: FAMILY MEDICINE

## 2022-08-03 RX ORDER — BENZONATATE 100 MG/1
100 CAPSULE ORAL 3 TIMES DAILY PRN
Qty: 20 CAPSULE | Refills: 0 | Status: SHIPPED | OUTPATIENT
Start: 2022-08-03 | End: 2022-09-27 | Stop reason: ALTCHOICE

## 2022-08-03 RX ORDER — AMOXICILLIN AND CLAVULANATE POTASSIUM 875; 125 MG/1; MG/1
1 TABLET, FILM COATED ORAL 2 TIMES DAILY
Qty: 14 TABLET | Refills: 0 | Status: SHIPPED | OUTPATIENT
Start: 2022-08-03 | End: 2022-08-10

## 2022-08-03 RX ORDER — PREDNISONE 20 MG/1
20 TABLET ORAL DAILY
Qty: 5 TABLET | Refills: 0 | Status: SHIPPED | OUTPATIENT
Start: 2022-08-03 | End: 2022-08-08

## 2022-08-03 RX ORDER — ALBUTEROL SULFATE 90 UG/1
2 AEROSOL, METERED RESPIRATORY (INHALATION) EVERY 6 HOURS PRN
Qty: 8 G | Refills: 0 | Status: SHIPPED | OUTPATIENT
Start: 2022-08-03 | End: 2023-03-10 | Stop reason: SDUPTHER

## 2022-08-03 RX ORDER — CEFTRIAXONE 1 G/1
1 INJECTION, POWDER, FOR SOLUTION INTRAMUSCULAR; INTRAVENOUS
Status: COMPLETED | OUTPATIENT
Start: 2022-08-03 | End: 2022-08-03

## 2022-08-03 RX ADMIN — CEFTRIAXONE 1 G: 1 INJECTION, POWDER, FOR SOLUTION INTRAMUSCULAR; INTRAVENOUS at 04:08

## 2022-08-03 NOTE — PROGRESS NOTES
Subjective:       Patient ID: Merissa Ochoa is a 51 y.o. female.    Chief Complaint: COVID-19 Concerns (Chills, headache. Daughter and granddaughter positive for covid)    HPI  Review of Systems   Constitutional: Negative for activity change, appetite change, chills, diaphoresis, fatigue, fever and unexpected weight change.   HENT: Positive for nasal congestion, postnasal drip, rhinorrhea, sinus pressure/congestion and sore throat. Negative for dental problem, drooling, ear discharge, ear pain, facial swelling, hearing loss, mouth sores, nosebleeds, sneezing, tinnitus, trouble swallowing, voice change and goiter.    Eyes: Negative for photophobia, discharge, itching and visual disturbance.   Respiratory: Positive for cough and wheezing. Negative for apnea, choking, chest tightness, shortness of breath and stridor.    Cardiovascular: Negative for chest pain, palpitations, leg swelling and claudication.   Gastrointestinal: Negative for abdominal distention, abdominal pain, anal bleeding, blood in stool, change in bowel habit, constipation, diarrhea, nausea, vomiting and change in bowel habit.   Endocrine: Negative for cold intolerance, heat intolerance, polydipsia, polyphagia and polyuria.   Genitourinary: Negative for bladder incontinence, decreased urine volume, difficulty urinating, dysuria, enuresis, flank pain, frequency, hematuria, nocturia, pelvic pain and urgency.   Musculoskeletal: Negative for arthralgias, back pain, gait problem, joint swelling, leg pain, myalgias, neck pain, neck stiffness and joint deformity.   Integumentary:  Negative for pallor, rash, wound, breast mass and breast tenderness.   Allergic/Immunologic: Negative for environmental allergies, food allergies and immunocompromised state.   Neurological: Negative for dizziness, vertigo, tremors, seizures, syncope, facial asymmetry, speech difficulty, weakness, light-headedness, numbness, headaches, disturbances in coordination, memory loss and  coordination difficulties.   Hematological: Negative for adenopathy. Does not bruise/bleed easily.   Psychiatric/Behavioral: Negative for agitation, behavioral problems, confusion, decreased concentration, dysphoric mood, hallucinations, self-injury, sleep disturbance and suicidal ideas. The patient is not nervous/anxious and is not hyperactive.    Breast: Negative for mass and tenderness        Objective:      Physical Exam  Vitals reviewed.   Constitutional:       Appearance: Normal appearance.   HENT:      Head: Normocephalic and atraumatic.      Right Ear: Tympanic membrane, ear canal and external ear normal.      Left Ear: Tympanic membrane, ear canal and external ear normal.      Nose: Congestion and rhinorrhea present.      Mouth/Throat:      Mouth: Mucous membranes are moist.      Pharynx: Oropharynx is clear. Posterior oropharyngeal erythema present.   Eyes:      Extraocular Movements: Extraocular movements intact.      Conjunctiva/sclera: Conjunctivae normal.      Pupils: Pupils are equal, round, and reactive to light.   Cardiovascular:      Rate and Rhythm: Normal rate and regular rhythm.      Pulses: Normal pulses.      Heart sounds: Normal heart sounds.   Pulmonary:      Effort: Pulmonary effort is normal.      Breath sounds: Rhonchi present.   Abdominal:      General: Bowel sounds are normal.      Palpations: Abdomen is soft.   Musculoskeletal:         General: Normal range of motion.      Cervical back: Normal range of motion and neck supple.   Skin:     General: Skin is warm and dry.   Neurological:      General: No focal deficit present.      Mental Status: She is alert. Mental status is at baseline.   Psychiatric:         Mood and Affect: Mood normal.         Behavior: Behavior normal.         Thought Content: Thought content normal.         Judgment: Judgment normal.         Assessment:       1. Bronchitis    2. Encounter for screening laboratory testing for COVID-19 virus    3. Sinusitis,  unspecified chronicity, unspecified location        Plan:     Bronchitis    Encounter for screening laboratory testing for COVID-19 virus  -     SARS Coronavirus 2 Antigen, POCT    Sinusitis, unspecified chronicity, unspecified location  -     cefTRIAXone injection 1 g    Other orders  -     amoxicillin-clavulanate 875-125mg (AUGMENTIN) 875-125 mg per tablet; Take 1 tablet by mouth 2 (two) times a day. for 7 days  Dispense: 14 tablet; Refill: 0  -     predniSONE (DELTASONE) 20 MG tablet; Take 1 tablet (20 mg total) by mouth once daily. for 5 days  Dispense: 5 tablet; Refill: 0  -     benzonatate (TESSALON) 100 MG capsule; Take 1 capsule (100 mg total) by mouth 3 (three) times daily as needed for Cough.  Dispense: 20 capsule; Refill: 0  -     albuterol (VENTOLIN HFA) 90 mcg/actuation inhaler; Inhale 2 puffs into the lungs every 6 (six) hours as needed for Wheezing. Rescue  Dispense: 8 g; Refill: 0     Covid negative.

## 2022-08-03 NOTE — LETTER
August 3, 2022      [facility]  [address] [facility] [place] [address]  Phone: [phone]  Fax: [phone]       Patient: Merissa Ochoa   YOB: 1970  Date of Visit: 08/03/2022    To Whom It May Concern:    Marek Ochoa  was at [facility] on 08/03/2022. The patient may return to work/school on 08/08/2022 with no restrictions. If you have any questions or concerns, or if I can be of further assistance, please do not hesitate to contact me.    Sincerely,    Dr. Noe Hernandez II

## 2022-08-18 ENCOUNTER — OFFICE VISIT (OUTPATIENT)
Dept: FAMILY MEDICINE | Facility: CLINIC | Age: 52
End: 2022-08-18
Payer: COMMERCIAL

## 2022-08-18 VITALS
WEIGHT: 158 LBS | BODY MASS INDEX: 28 KG/M2 | DIASTOLIC BLOOD PRESSURE: 82 MMHG | HEIGHT: 63 IN | SYSTOLIC BLOOD PRESSURE: 122 MMHG

## 2022-08-18 DIAGNOSIS — F41.1 GAD (GENERALIZED ANXIETY DISORDER): Chronic | ICD-10-CM

## 2022-08-18 DIAGNOSIS — F33.9 RECURRENT MAJOR DEPRESSIVE DISORDER, REMISSION STATUS UNSPECIFIED: Chronic | ICD-10-CM

## 2022-08-18 DIAGNOSIS — Z20.822 ENCOUNTER FOR LABORATORY TESTING FOR COVID-19 VIRUS: Primary | ICD-10-CM

## 2022-08-18 DIAGNOSIS — K21.9 GASTROESOPHAGEAL REFLUX DISEASE WITHOUT ESOPHAGITIS: ICD-10-CM

## 2022-08-18 DIAGNOSIS — J32.1 FRONTAL SINUSITIS, UNSPECIFIED CHRONICITY: ICD-10-CM

## 2022-08-18 LAB
CTP QC/QA: YES
FLUAV AG NPH QL: NEGATIVE
FLUBV AG NPH QL: NEGATIVE
SARS-COV-2 AG RESP QL IA.RAPID: NEGATIVE

## 2022-08-18 PROCEDURE — 99214 OFFICE O/P EST MOD 30 MIN: CPT | Mod: ,,, | Performed by: FAMILY MEDICINE

## 2022-08-18 PROCEDURE — 87428 SARSCOV & INF VIR A&B AG IA: CPT | Mod: QW,,, | Performed by: FAMILY MEDICINE

## 2022-08-18 PROCEDURE — 3008F BODY MASS INDEX DOCD: CPT | Mod: ,,, | Performed by: FAMILY MEDICINE

## 2022-08-18 PROCEDURE — 87428 POCT SARS-COV2 (COVID) WITH FLU ANTIGEN: ICD-10-PCS | Mod: QW,,, | Performed by: FAMILY MEDICINE

## 2022-08-18 PROCEDURE — 3074F PR MOST RECENT SYSTOLIC BLOOD PRESSURE < 130 MM HG: ICD-10-PCS | Mod: ,,, | Performed by: FAMILY MEDICINE

## 2022-08-18 PROCEDURE — 1160F PR REVIEW ALL MEDS BY PRESCRIBER/CLIN PHARMACIST DOCUMENTED: ICD-10-PCS | Mod: ,,, | Performed by: FAMILY MEDICINE

## 2022-08-18 PROCEDURE — 3008F PR BODY MASS INDEX (BMI) DOCUMENTED: ICD-10-PCS | Mod: ,,, | Performed by: FAMILY MEDICINE

## 2022-08-18 PROCEDURE — 1160F RVW MEDS BY RX/DR IN RCRD: CPT | Mod: ,,, | Performed by: FAMILY MEDICINE

## 2022-08-18 PROCEDURE — 3074F SYST BP LT 130 MM HG: CPT | Mod: ,,, | Performed by: FAMILY MEDICINE

## 2022-08-18 PROCEDURE — 99214 PR OFFICE/OUTPT VISIT, EST, LEVL IV, 30-39 MIN: ICD-10-PCS | Mod: ,,, | Performed by: FAMILY MEDICINE

## 2022-08-18 PROCEDURE — 3079F PR MOST RECENT DIASTOLIC BLOOD PRESSURE 80-89 MM HG: ICD-10-PCS | Mod: ,,, | Performed by: FAMILY MEDICINE

## 2022-08-18 PROCEDURE — 1159F PR MEDICATION LIST DOCUMENTED IN MEDICAL RECORD: ICD-10-PCS | Mod: ,,, | Performed by: FAMILY MEDICINE

## 2022-08-18 PROCEDURE — 3079F DIAST BP 80-89 MM HG: CPT | Mod: ,,, | Performed by: FAMILY MEDICINE

## 2022-08-18 PROCEDURE — 1159F MED LIST DOCD IN RCRD: CPT | Mod: ,,, | Performed by: FAMILY MEDICINE

## 2022-08-18 RX ORDER — ALPRAZOLAM 1 MG/1
TABLET ORAL
Qty: 90 TABLET | Refills: 2 | Status: SHIPPED | OUTPATIENT
Start: 2022-08-18 | End: 2022-09-27 | Stop reason: SDUPTHER

## 2022-08-18 RX ORDER — FLUOXETINE HYDROCHLORIDE 40 MG/1
40 CAPSULE ORAL DAILY
Qty: 90 CAPSULE | Refills: 1 | Status: SHIPPED | OUTPATIENT
Start: 2022-08-18 | End: 2022-09-27 | Stop reason: SDUPTHER

## 2022-08-18 RX ORDER — DEXAMETHASONE 0.75 MG/1
TABLET ORAL
Qty: 21 TABLET | Refills: 0 | Status: SHIPPED | OUTPATIENT
Start: 2022-08-18 | End: 2022-09-27 | Stop reason: ALTCHOICE

## 2022-08-18 NOTE — PROGRESS NOTES
Heath Doshi MD   [facility]  [facility]  [address] [place] [address] [place] [address]  [phone]      PATIENT NAME: Merissa Ochoa  : 1970  DATE: 22  MRN: [id number]      Billing Provider: Heath Doshi MD  Level of Service:   Patient PCP Information       Provider PCP Type    Heath Doshi MD General            Reason for Visit / Chief Complaint: Other       Update PCP  Update Chief Complaint         History of Present Illness / Problem Focused Workflow     Merissa Ochoa presents to the clinic with Other       52 yo WF who says that she has not felt well for the past 2 weeks.  She had been exposed to multiple people (family and coworkers) positive for COVID-19 prior to onset of symptoms.  Symptoms have varied in intensity since then.  She was seen at another clinic on 22 and tested negative for COVID-19 at that time.  Has done home tests, as well, which were negative.  Has completed a course of augmentin and 5 days of steroids.    She had COVID-19 a year ago (2021) and has had chronic fatigue and SOB since that time.  She also reports that prior to COVID-19 she did not have GERD but has had acid reflux ever since.  She takes pepcid, which she states helps some, but otherwise will have reflux every day. Has associated nausea.     Also has daily headaches ever since having COVID-19.  Has had colonoscopy done in past couple of years.  She would like a referral for an EGD.    Wants to go ahead and get daily meds for MDD/EAMON refilled while here.      Review of Systems     Review of Systems   Constitutional: Positive for chills and fatigue. Negative for fever.   HENT: Positive for sinus pressure/congestion and tinnitus.    Respiratory: Positive for cough and shortness of breath.    Cardiovascular: Negative for chest pain, palpitations and leg swelling.   Gastrointestinal: Positive for abdominal pain, diarrhea, nausea and reflux.  Negative for anal bleeding and blood in stool.   Neurological: Positive for headaches. Negative for seizures and syncope.        Medical / Social / Family History     Past Medical History:   Diagnosis Date    Anxiety attack     Diverticula, colon 6/25/2021    Family history of colonic polyps 6/25/2021    Polyp of descending colon 6/25/2021    Polyp of transverse colon 6/25/2021    Positive colorectal cancer screening using Cologuard test 6/25/2021       Past Surgical History:   Procedure Laterality Date    CHOLECYSTECTOMY      HYSTERECTOMY      TUBAL LIGATION         Social History    reports that she has been smoking cigarettes. She has never used smokeless tobacco. She reports previous alcohol use. She reports previous drug use.   Social History     Tobacco Use    Smoking status: Current Every Day Smoker     Types: Cigarettes    Smokeless tobacco: Never Used   Substance Use Topics    Alcohol use: Not Currently    Drug use: Not Currently       Family History  Family History   Problem Relation Age of Onset    Diabetes Mellitus Other     Heart disease Other     Hypertension Other     Mental illness Other     Ovarian cancer Other        Medications and Allergies     Medications  Outpatient Medications Marked as Taking for the 8/18/22 encounter (Office Visit) with Heath Doshi MD   Medication Sig Dispense Refill    albuterol (PROVENTIL/VENTOLIN HFA) 90 mcg/actuation inhaler Inhale 2 puffs into the lungs every 6 (six) hours. Rescue 8 g 5    albuterol (VENTOLIN HFA) 90 mcg/actuation inhaler Inhale 2 puffs into the lungs every 6 (six) hours as needed for Wheezing. Rescue 8 g 0    ALPRAZolam (XANAX) 1 MG tablet TAKE 1 TABLET BY MOUTH 3 TIMES DAILY *CAUSES DROWSINESS-AVOID ALCOHOL!! 90 tablet 0    benzonatate (TESSALON) 100 MG capsule Take 1 capsule (100 mg total) by mouth 3 (three) times daily as needed for Cough. 20 capsule 0    famotidine (PEPCID) 20 MG tablet Take 1 tablet (20 mg total) by  mouth 2 (two) times daily. 60 tablet 11    FLUoxetine 40 MG capsule Take 1 capsule (40 mg total) by mouth once daily. 90 capsule 1    ondansetron (ZOFRAN) 4 MG tablet Take 1 tablet (4 mg total) by mouth every 8 (eight) hours as needed for Nausea. 21 tablet 1       Allergies  Review of patient's allergies indicates:   Allergen Reactions    Betadine [povidone-iodine]     Propranolol     Iodinated contrast media Nausea And Vomiting    Lexapro [escitalopram] Nausea And Vomiting       Physical Examination     Vitals:    08/18/22 1434   BP: 122/82     Physical Exam  Vitals reviewed.   Constitutional:       General: She is not in acute distress.     Appearance: Normal appearance. She is not toxic-appearing.   HENT:      Head: Normocephalic and atraumatic.   Eyes:      Extraocular Movements: Extraocular movements intact.      Conjunctiva/sclera: Conjunctivae normal.      Pupils: Pupils are equal, round, and reactive to light.   Cardiovascular:      Rate and Rhythm: Normal rate and regular rhythm.      Heart sounds: Normal heart sounds.   Pulmonary:      Effort: Pulmonary effort is normal. No respiratory distress.      Breath sounds: Normal breath sounds.   Abdominal:      General: There is no distension.      Tenderness: There is no abdominal tenderness.   Musculoskeletal:         General: Normal range of motion.      Cervical back: Normal range of motion.   Skin:     General: Skin is warm and dry.   Neurological:      General: No focal deficit present.      Mental Status: She is alert and oriented to person, place, and time.   Psychiatric:         Mood and Affect: Mood normal.         Behavior: Behavior normal.       Office Visit on 08/18/2022   Component Date Value Ref Range Status    SARS Coronavirus 2 Antigen 08/18/2022 Negative  Negative Final    Rapid Influenza A Ag 08/18/2022 Negative  Negative Final    Rapid Influenza B Ag 08/18/2022 Negative  Negative Final     Acceptable 08/18/2022 Yes    Final           Assessment and Plan (including Health Maintenance)      Problem List  Smart Sets  Document Outside HM   :    Plan: refer for EGD        Health Maintenance Due   Topic Date Due    Hepatitis C Screening  Never done    COVID-19 Vaccine (1) Never done    Mammogram  Never done       Problem List Items Addressed This Visit    None     Visit Diagnoses     Encounter for laboratory testing for COVID-19 virus    -  Primary    Relevant Orders    POCT SARS-COV2 (COVID) with Flu Antigen (Completed)          Health Maintenance Topics with due status: Not Due       Topic Last Completion Date    Colorectal Cancer Screening 06/25/2021    Lipid Panel 03/11/2022    Influenza Vaccine Not Due       Future Appointments   Date Time Provider Department Center   11/17/2022  9:00 AM Heath Doshi MD [facility]   3/17/2023 10:00 AM Heath Doshi MD [facility]            Signature:  Heath Doshi MD  [facility]  [facility]  [address] [place] [address]  [phone]    Date of encounter: 8/18/22

## 2022-09-27 DIAGNOSIS — R06.02 SOB (SHORTNESS OF BREATH) ON EXERTION: ICD-10-CM

## 2022-09-27 DIAGNOSIS — F33.9 RECURRENT MAJOR DEPRESSIVE DISORDER, REMISSION STATUS UNSPECIFIED: Chronic | ICD-10-CM

## 2022-09-27 DIAGNOSIS — Z86.16 HISTORY OF 2019 NOVEL CORONAVIRUS DISEASE (COVID-19): ICD-10-CM

## 2022-09-27 DIAGNOSIS — F41.1 GAD (GENERALIZED ANXIETY DISORDER): Chronic | ICD-10-CM

## 2022-09-27 RX ORDER — ALBUTEROL SULFATE 90 UG/1
2 AEROSOL, METERED RESPIRATORY (INHALATION) EVERY 6 HOURS PRN
Qty: 8 G | Refills: 0 | Status: CANCELLED | OUTPATIENT
Start: 2022-09-27

## 2022-09-27 RX ORDER — FLUOXETINE HYDROCHLORIDE 40 MG/1
40 CAPSULE ORAL DAILY
Qty: 90 CAPSULE | Refills: 1 | Status: SHIPPED | OUTPATIENT
Start: 2022-09-27 | End: 2023-03-10 | Stop reason: SDUPTHER

## 2022-09-27 RX ORDER — ALBUTEROL SULFATE 90 UG/1
2 AEROSOL, METERED RESPIRATORY (INHALATION) EVERY 6 HOURS
Qty: 8 G | Refills: 5 | Status: SHIPPED | OUTPATIENT
Start: 2022-09-27 | End: 2023-09-12

## 2022-09-27 RX ORDER — ALPRAZOLAM 1 MG/1
TABLET ORAL
Qty: 90 TABLET | Refills: 2 | Status: SHIPPED | OUTPATIENT
Start: 2022-09-27 | End: 2023-01-03 | Stop reason: SDUPTHER

## 2022-11-03 ENCOUNTER — OFFICE VISIT (OUTPATIENT)
Dept: FAMILY MEDICINE | Facility: CLINIC | Age: 52
End: 2022-11-03
Payer: COMMERCIAL

## 2022-11-03 VITALS
WEIGHT: 158 LBS | HEART RATE: 74 BPM | SYSTOLIC BLOOD PRESSURE: 120 MMHG | HEIGHT: 63 IN | DIASTOLIC BLOOD PRESSURE: 86 MMHG | BODY MASS INDEX: 28 KG/M2

## 2022-11-03 DIAGNOSIS — E55.9 VITAMIN D DEFICIENCY: ICD-10-CM

## 2022-11-03 DIAGNOSIS — R06.02 SOB (SHORTNESS OF BREATH): Chronic | ICD-10-CM

## 2022-11-03 DIAGNOSIS — R53.82 CHRONIC FATIGUE: Primary | Chronic | ICD-10-CM

## 2022-11-03 DIAGNOSIS — Z86.16 PERSONAL HISTORY OF COVID-19: ICD-10-CM

## 2022-11-03 LAB
25(OH)D3 SERPL-MCNC: 22.5 NG/ML
ALBUMIN SERPL BCP-MCNC: 3.6 G/DL (ref 3.5–5)
ALBUMIN/GLOB SERPL: 1.2 {RATIO}
ALP SERPL-CCNC: 90 U/L (ref 41–108)
ALT SERPL W P-5'-P-CCNC: 21 U/L (ref 13–56)
ANION GAP SERPL CALCULATED.3IONS-SCNC: 10 MMOL/L (ref 7–16)
AST SERPL W P-5'-P-CCNC: 15 U/L (ref 15–37)
BASOPHILS # BLD AUTO: 0.03 K/UL (ref 0–0.2)
BASOPHILS NFR BLD AUTO: 0.6 % (ref 0–1)
BILIRUB SERPL-MCNC: 0.3 MG/DL (ref ?–1.2)
BUN SERPL-MCNC: 11 MG/DL (ref 7–18)
BUN/CREAT SERPL: 9 (ref 6–20)
CALCIUM SERPL-MCNC: 9.2 MG/DL (ref 8.5–10.1)
CHLORIDE SERPL-SCNC: 107 MMOL/L (ref 98–107)
CO2 SERPL-SCNC: 28 MMOL/L (ref 21–32)
CREAT SERPL-MCNC: 1.17 MG/DL (ref 0.55–1.02)
DIFFERENTIAL METHOD BLD: ABNORMAL
EGFR (NO RACE VARIABLE) (RUSH/TITUS): 56 ML/MIN/1.73M²
EOSINOPHIL # BLD AUTO: 0.31 K/UL (ref 0–0.5)
EOSINOPHIL NFR BLD AUTO: 6.3 % (ref 1–4)
ERYTHROCYTE [DISTWIDTH] IN BLOOD BY AUTOMATED COUNT: 14 % (ref 11.5–14.5)
GLOBULIN SER-MCNC: 3.1 G/DL (ref 2–4)
GLUCOSE SERPL-MCNC: 85 MG/DL (ref 74–106)
HCT VFR BLD AUTO: 46.1 % (ref 38–47)
HGB BLD-MCNC: 15.3 G/DL (ref 12–16)
IMM GRANULOCYTES # BLD AUTO: 0.01 K/UL (ref 0–0.04)
IMM GRANULOCYTES NFR BLD: 0.2 % (ref 0–0.4)
LYMPHOCYTES # BLD AUTO: 1.24 K/UL (ref 1–4.8)
LYMPHOCYTES NFR BLD AUTO: 25.4 % (ref 27–41)
MCH RBC QN AUTO: 30.5 PG (ref 27–31)
MCHC RBC AUTO-ENTMCNC: 33.2 G/DL (ref 32–36)
MCV RBC AUTO: 91.8 FL (ref 80–96)
MONOCYTES # BLD AUTO: 0.4 K/UL (ref 0–0.8)
MONOCYTES NFR BLD AUTO: 8.2 % (ref 2–6)
MPC BLD CALC-MCNC: 10.8 FL (ref 9.4–12.4)
NEUTROPHILS # BLD AUTO: 2.9 K/UL (ref 1.8–7.7)
NEUTROPHILS NFR BLD AUTO: 59.3 % (ref 53–65)
NRBC # BLD AUTO: 0 X10E3/UL
NRBC, AUTO (.00): 0 %
PLATELET # BLD AUTO: 283 K/UL (ref 150–400)
POTASSIUM SERPL-SCNC: 4.4 MMOL/L (ref 3.5–5.1)
PROT SERPL-MCNC: 6.7 G/DL (ref 6.4–8.2)
RBC # BLD AUTO: 5.02 M/UL (ref 4.2–5.4)
SODIUM SERPL-SCNC: 141 MMOL/L (ref 136–145)
TSH SERPL DL<=0.005 MIU/L-ACNC: 0.76 UIU/ML (ref 0.36–3.74)
VIT B12 SERPL-MCNC: 289 PG/ML (ref 193–986)
WBC # BLD AUTO: 4.89 K/UL (ref 4.5–11)

## 2022-11-03 PROCEDURE — 99214 OFFICE O/P EST MOD 30 MIN: CPT | Mod: ,,, | Performed by: FAMILY MEDICINE

## 2022-11-03 PROCEDURE — 1160F PR REVIEW ALL MEDS BY PRESCRIBER/CLIN PHARMACIST DOCUMENTED: ICD-10-PCS | Mod: ,,, | Performed by: FAMILY MEDICINE

## 2022-11-03 PROCEDURE — 99214 PR OFFICE/OUTPT VISIT, EST, LEVL IV, 30-39 MIN: ICD-10-PCS | Mod: ,,, | Performed by: FAMILY MEDICINE

## 2022-11-03 PROCEDURE — 80050 GENERAL HEALTH PANEL: CPT | Mod: ,,, | Performed by: CLINICAL MEDICAL LABORATORY

## 2022-11-03 PROCEDURE — 3008F PR BODY MASS INDEX (BMI) DOCUMENTED: ICD-10-PCS | Mod: ,,, | Performed by: FAMILY MEDICINE

## 2022-11-03 PROCEDURE — 82306 VITAMIN D 25 HYDROXY: CPT | Mod: ,,, | Performed by: CLINICAL MEDICAL LABORATORY

## 2022-11-03 PROCEDURE — 3074F SYST BP LT 130 MM HG: CPT | Mod: ,,, | Performed by: FAMILY MEDICINE

## 2022-11-03 PROCEDURE — 82607 VITAMIN B12: ICD-10-PCS | Mod: ,,, | Performed by: CLINICAL MEDICAL LABORATORY

## 2022-11-03 PROCEDURE — 82607 VITAMIN B-12: CPT | Mod: ,,, | Performed by: CLINICAL MEDICAL LABORATORY

## 2022-11-03 PROCEDURE — 1159F MED LIST DOCD IN RCRD: CPT | Mod: ,,, | Performed by: FAMILY MEDICINE

## 2022-11-03 PROCEDURE — 3079F DIAST BP 80-89 MM HG: CPT | Mod: ,,, | Performed by: FAMILY MEDICINE

## 2022-11-03 PROCEDURE — 3008F BODY MASS INDEX DOCD: CPT | Mod: ,,, | Performed by: FAMILY MEDICINE

## 2022-11-03 PROCEDURE — 1160F RVW MEDS BY RX/DR IN RCRD: CPT | Mod: ,,, | Performed by: FAMILY MEDICINE

## 2022-11-03 PROCEDURE — 82306 VITAMIN D: ICD-10-PCS | Mod: ,,, | Performed by: CLINICAL MEDICAL LABORATORY

## 2022-11-03 PROCEDURE — 1159F PR MEDICATION LIST DOCUMENTED IN MEDICAL RECORD: ICD-10-PCS | Mod: ,,, | Performed by: FAMILY MEDICINE

## 2022-11-03 PROCEDURE — 3074F PR MOST RECENT SYSTOLIC BLOOD PRESSURE < 130 MM HG: ICD-10-PCS | Mod: ,,, | Performed by: FAMILY MEDICINE

## 2022-11-03 PROCEDURE — 80050 PR  GENERAL HEALTH PANEL: ICD-10-PCS | Mod: ,,, | Performed by: CLINICAL MEDICAL LABORATORY

## 2022-11-03 PROCEDURE — 3079F PR MOST RECENT DIASTOLIC BLOOD PRESSURE 80-89 MM HG: ICD-10-PCS | Mod: ,,, | Performed by: FAMILY MEDICINE

## 2022-11-03 NOTE — PROGRESS NOTES
"   Heath Doshi MD   [facility]  MEDICAL GROUP [address] [address]  [phone]      PATIENT NAME: Merissa Ochoa  : 1970  DATE: 11/3/22  MRN: [id number]      Billing Provider: Heath Doshi MD  Level of Service:   Patient PCP Information       Provider PCP Type    Heath Doshi MD General            Reason for Visit / Chief Complaint: Fatigue (Pt c/o tiredness, headache,fatigue)       Update PCP  Update Chief Complaint         History of Present Illness / Problem Focused Workflow     Merissa Ochoa presents to the clinic with Fatigue (Pt c/o tiredness, headache,fatigue)       53 yo WF who had COVID-19 infection > 1 year ago and says that she never has fully recovered.  Has chronic fatigue, brain fog, cough and SOB.  She says that she wants to sleep all day and has "absolutely no energy."  She reports that she gets worn out doing simple tasks such as washing dishes.  She says that she is to the point where she says "I just really don't feel like doing anything."      Fatigue  Associated symptoms include coughing, fatigue, headaches and weakness.     Review of Systems     Review of Systems   Constitutional:  Positive for activity change and fatigue.   Respiratory:  Positive for cough, chest tightness and shortness of breath.    Neurological:  Positive for weakness, light-headedness and headaches.   Psychiatric/Behavioral:  Positive for decreased concentration and sleep disturbance.       Medical / Social / Family History     Past Medical History:   Diagnosis Date    Anxiety attack     Diverticula, colon 2021    Family history of colonic polyps 2021    Polyp of descending colon 2021    Polyp of transverse colon 2021    Positive colorectal cancer screening using Cologuard test 2021       Past Surgical History:   Procedure Laterality Date    CHOLECYSTECTOMY      HYSTERECTOMY      TUBAL LIGATION   "       Social History    reports that she has been smoking cigarettes. She has never used smokeless tobacco. She reports that she does not currently use alcohol. She reports that she does not currently use drugs.   Social History     Tobacco Use    Smoking status: Every Day     Types: Cigarettes    Smokeless tobacco: Never   Substance Use Topics    Alcohol use: Not Currently    Drug use: Not Currently       Family History  Family History   Problem Relation Age of Onset    Diabetes Mellitus Other     Heart disease Other     Hypertension Other     Mental illness Other     Ovarian cancer Other        Medications and Allergies     Medications  No outpatient medications have been marked as taking for the 11/3/22 encounter (Office Visit) with Heath Doshi MD.       Allergies  Review of patient's allergies indicates:   Allergen Reactions    Betadine [povidone-iodine]     Propranolol     Iodinated contrast media Nausea And Vomiting    Lexapro [escitalopram] Nausea And Vomiting       Physical Examination     Vitals:    11/03/22 1024   BP: 120/86   Pulse: 74     Physical Exam  Vitals reviewed.   Constitutional:       Appearance: Normal appearance.   HENT:      Head: Normocephalic and atraumatic.   Eyes:      Extraocular Movements: Extraocular movements intact.      Conjunctiva/sclera: Conjunctivae normal.      Pupils: Pupils are equal, round, and reactive to light.   Cardiovascular:      Rate and Rhythm: Normal rate and regular rhythm.      Heart sounds: Normal heart sounds.   Pulmonary:      Effort: Pulmonary effort is normal. No respiratory distress.      Breath sounds: Normal breath sounds. No wheezing, rhonchi or rales.   Musculoskeletal:         General: Normal range of motion.      Cervical back: Normal range of motion.   Skin:     General: Skin is warm and dry.   Neurological:      General: No focal deficit present.      Mental Status: She is alert and oriented to person, place, and time.   Psychiatric:          Mood and Affect: Mood normal.         Behavior: Behavior normal.    X-Ray Chest PA And Lateral  Narrative: EXAMINATION:  XR CHEST PA AND LATERAL    CLINICAL HISTORY:  Shortness of breath    COMPARISON:  Chest x-ray August 26, 2021    TECHNIQUE:  Frontal and lateral views of the chest.    FINDINGS:  Heart size appears within normal limits.  Chronic change of the lungs without focal consolidation, pleural effusion, or pneumothorax.  Visualized osseous and surrounding soft tissue structures demonstrate no acute abnormality.  Impression: No acute cardiopulmonary process demonstrated.    Point of Service: [facility]    Electronically signed by: Aneesh Buckner  Date:    11/03/2022  Time:    11:21      Assessment and Plan (including Health Maintenance)      Problem List  Smart Sets  Document Outside HM   :    Plan: CXR and labs today.  Follow up in one week.        There are no preventive care reminders to display for this patient.    Problem List Items Addressed This Visit    None  Visit Diagnoses       Chronic fatigue    -  Primary    Relevant Orders    TSH    CBC Auto Differential    Comprehensive Metabolic Panel    Vitamin B12    Personal history of COVID-19        Vitamin D deficiency        Relevant Orders    Vitamin D    SOB (shortness of breath)                Health Maintenance Topics with due status: Not Due       Topic Last Completion Date    Colorectal Cancer Screening 06/25/2021    Lipid Panel 03/11/2022       Future Appointments   Date Time Provider Department Center   11/10/2022 10:45 AM Heath Doshi MD [facility]   11/17/2022  9:00 AM Heath Doshi MD [facility]   3/17/2023 10:00 AM Heath Doshi MD [facility]            Signature:  Heath Doshi MD  [facility]  MEDICAL GROUP [facility]  [address] [place] [id number]  [phone]    Date of encounter: 11/3/22

## 2022-11-03 NOTE — LETTER
November 3, 2022      [facility]  [address] [place] [address]  Phone: [phone]  Fax: [phone]       Patient: Merissa Ochoa   YOB: 1970  Date of Visit: 11/03/2022    To Whom It May Concern:    Marek Ochoa  was at [facility] on 11/03/2022. Please excuse from work 11/03/2022-11/05/2022 due to illness.  If you have any questions or concerns, or if I can be of further assistance, please do not hesitate to contact me.    Sincerely,    Heath Doshi MD

## 2022-11-10 ENCOUNTER — OFFICE VISIT (OUTPATIENT)
Dept: FAMILY MEDICINE | Facility: CLINIC | Age: 52
End: 2022-11-10
Payer: COMMERCIAL

## 2022-11-10 VITALS
HEIGHT: 63 IN | SYSTOLIC BLOOD PRESSURE: 122 MMHG | DIASTOLIC BLOOD PRESSURE: 78 MMHG | BODY MASS INDEX: 28 KG/M2 | WEIGHT: 158 LBS

## 2022-11-10 DIAGNOSIS — U09.9 COVID-19 LONG HAULER: Primary | ICD-10-CM

## 2022-11-10 DIAGNOSIS — E55.9 VITAMIN D DEFICIENCY: Chronic | ICD-10-CM

## 2022-11-10 DIAGNOSIS — E53.8 B12 DEFICIENCY: Chronic | ICD-10-CM

## 2022-11-10 DIAGNOSIS — R06.2 WHEEZING: ICD-10-CM

## 2022-11-10 PROCEDURE — 3078F PR MOST RECENT DIASTOLIC BLOOD PRESSURE < 80 MM HG: ICD-10-PCS | Mod: ,,, | Performed by: FAMILY MEDICINE

## 2022-11-10 PROCEDURE — 3008F BODY MASS INDEX DOCD: CPT | Mod: ,,, | Performed by: FAMILY MEDICINE

## 2022-11-10 PROCEDURE — 99214 PR OFFICE/OUTPT VISIT, EST, LEVL IV, 30-39 MIN: ICD-10-PCS | Mod: 25,,, | Performed by: FAMILY MEDICINE

## 2022-11-10 PROCEDURE — 1159F MED LIST DOCD IN RCRD: CPT | Mod: ,,, | Performed by: FAMILY MEDICINE

## 2022-11-10 PROCEDURE — 1159F PR MEDICATION LIST DOCUMENTED IN MEDICAL RECORD: ICD-10-PCS | Mod: ,,, | Performed by: FAMILY MEDICINE

## 2022-11-10 PROCEDURE — 1160F PR REVIEW ALL MEDS BY PRESCRIBER/CLIN PHARMACIST DOCUMENTED: ICD-10-PCS | Mod: ,,, | Performed by: FAMILY MEDICINE

## 2022-11-10 PROCEDURE — 3074F PR MOST RECENT SYSTOLIC BLOOD PRESSURE < 130 MM HG: ICD-10-PCS | Mod: ,,, | Performed by: FAMILY MEDICINE

## 2022-11-10 PROCEDURE — 3078F DIAST BP <80 MM HG: CPT | Mod: ,,, | Performed by: FAMILY MEDICINE

## 2022-11-10 PROCEDURE — 1160F RVW MEDS BY RX/DR IN RCRD: CPT | Mod: ,,, | Performed by: FAMILY MEDICINE

## 2022-11-10 PROCEDURE — 96372 THER/PROPH/DIAG INJ SC/IM: CPT | Mod: ,,, | Performed by: FAMILY MEDICINE

## 2022-11-10 PROCEDURE — 96372 PR INJECTION,THERAP/PROPH/DIAG2ST, IM OR SUBCUT: ICD-10-PCS | Mod: ,,, | Performed by: FAMILY MEDICINE

## 2022-11-10 PROCEDURE — 3008F PR BODY MASS INDEX (BMI) DOCUMENTED: ICD-10-PCS | Mod: ,,, | Performed by: FAMILY MEDICINE

## 2022-11-10 PROCEDURE — 99214 OFFICE O/P EST MOD 30 MIN: CPT | Mod: 25,,, | Performed by: FAMILY MEDICINE

## 2022-11-10 PROCEDURE — 3074F SYST BP LT 130 MM HG: CPT | Mod: ,,, | Performed by: FAMILY MEDICINE

## 2022-11-10 RX ORDER — PREDNISONE 20 MG/1
TABLET ORAL
Qty: 15 TABLET | Refills: 0 | Status: SHIPPED | OUTPATIENT
Start: 2022-11-10 | End: 2023-03-10

## 2022-11-10 RX ORDER — CYANOCOBALAMIN 1000 UG/ML
1000 INJECTION, SOLUTION INTRAMUSCULAR; SUBCUTANEOUS
Status: COMPLETED | OUTPATIENT
Start: 2022-11-10 | End: 2022-11-10

## 2022-11-10 RX ORDER — ERGOCALCIFEROL 1.25 MG/1
50000 CAPSULE ORAL
Qty: 8 CAPSULE | Refills: 0 | Status: SHIPPED | OUTPATIENT
Start: 2022-11-10 | End: 2023-03-10

## 2022-11-10 RX ADMIN — CYANOCOBALAMIN 1000 MCG: 1000 INJECTION, SOLUTION INTRAMUSCULAR; SUBCUTANEOUS at 10:11

## 2022-11-10 NOTE — PROGRESS NOTES
Heath Doshi MD   [facility]  MEDICAL GROUP [facility]  [address] [place] [address]  [phone]      PATIENT NAME: Merissa Ochoa  : 1970  DATE: 11/10/22  MRN: [id number]      Billing Provider: Heath Doshi MD  Level of Service:   Patient PCP Information       Provider PCP Type    Heath Doshi MD General            Reason for Visit / Chief Complaint: Follow-up (1wk follow up for lab results)       Update PCP  Update Chief Complaint         History of Present Illness / Problem Focused Workflow     Merissa Ochoa presents to the clinic with Follow-up (1wk follow up for lab results)       53 yo WF with chronic fatigue and SOB that has persisted for about a year after having COVID-19 infection.  She was seen for same a week ago and had labwork done.  She is here today to follow up on that.      Review of Systems     Review of Systems   Constitutional:  Positive for activity change and fatigue.   Respiratory:  Positive for cough, chest tightness and shortness of breath.    Neurological:  Positive for weakness, light-headedness and headaches.   Psychiatric/Behavioral:  Positive for decreased concentration and sleep disturbance.       Medical / Social / Family History     Past Medical History:   Diagnosis Date    Anxiety attack     Diverticula, colon 2021    Family history of colonic polyps 2021    Polyp of descending colon 2021    Polyp of transverse colon 2021    Positive colorectal cancer screening using Cologuard test 2021       Past Surgical History:   Procedure Laterality Date    CHOLECYSTECTOMY      HYSTERECTOMY      TUBAL LIGATION         Social History    reports that she has been smoking cigarettes. She has never used smokeless tobacco. She reports that she does not currently use alcohol. She reports that she does not currently use drugs.   Social History     Tobacco Use    Smoking status: Every Day     Types:  Cigarettes    Smokeless tobacco: Never   Substance Use Topics    Alcohol use: Not Currently    Drug use: Not Currently       Family History  Family History   Problem Relation Age of Onset    Diabetes Mellitus Other     Heart disease Other     Hypertension Other     Mental illness Other     Ovarian cancer Other        Medications and Allergies     Medications  No outpatient medications have been marked as taking for the 11/10/22 encounter (Office Visit) with Heath Doshi MD.       Allergies  Review of patient's allergies indicates:   Allergen Reactions    Betadine [povidone-iodine]     Propranolol     Iodinated contrast media Nausea And Vomiting    Lexapro [escitalopram] Nausea And Vomiting       Physical Examination     Vitals:    11/10/22 0920   BP: 122/78     Physical Exam  Vitals reviewed.   Constitutional:       Appearance: Normal appearance.      Comments: She appears tired   HENT:      Head: Normocephalic and atraumatic.   Eyes:      Extraocular Movements: Extraocular movements intact.      Conjunctiva/sclera: Conjunctivae normal.      Pupils: Pupils are equal, round, and reactive to light.   Cardiovascular:      Rate and Rhythm: Normal rate and regular rhythm.      Heart sounds: Normal heart sounds.   Pulmonary:      Effort: Pulmonary effort is normal. No respiratory distress.      Breath sounds: Normal breath sounds. No wheezing, rhonchi or rales.   Musculoskeletal:         General: Normal range of motion.      Cervical back: Normal range of motion.   Skin:     General: Skin is warm and dry.   Neurological:      General: No focal deficit present.      Mental Status: She is alert and oriented to person, place, and time.   Psychiatric:         Mood and Affect: Mood normal.         Behavior: Behavior normal.          No visits with results within 1 Week(s) from this visit.   Latest known visit with results is:   Office Visit on 11/03/2022   Component Date Value Ref Range Status    TSH 11/03/2022 0.764   0.358 - 3.740 uIU/mL Final    Sodium 11/03/2022 141  136 - 145 mmol/L Final    Potassium 11/03/2022 4.4  3.5 - 5.1 mmol/L Final    Chloride 11/03/2022 107  98 - 107 mmol/L Final    CO2 11/03/2022 28  21 - 32 mmol/L Final    Anion Gap 11/03/2022 10  7 - 16 mmol/L Final    Glucose 11/03/2022 85  74 - 106 mg/dL Final    BUN 11/03/2022 11  7 - 18 mg/dL Final    Creatinine 11/03/2022 1.17 (H)  0.55 - 1.02 mg/dL Final    BUN/Creatinine Ratio 11/03/2022 9  6 - 20 Final    Calcium 11/03/2022 9.2  8.5 - 10.1 mg/dL Final    Total Protein 11/03/2022 6.7  6.4 - 8.2 g/dL Final    Albumin 11/03/2022 3.6  3.5 - 5.0 g/dL Final    Globulin 11/03/2022 3.1  2.0 - 4.0 g/dL Final    A/G Ratio 11/03/2022 1.2   Final    Bilirubin, Total 11/03/2022 0.3  >0.0 - 1.2 mg/dL Final    Alk Phos 11/03/2022 90  41 - 108 U/L Final    ALT 11/03/2022 21  13 - 56 U/L Final    AST 11/03/2022 15  15 - 37 U/L Final    eGFR 11/03/2022 56 (L)  >=60 mL/min/1.73m² Final    Vitamin B12 11/03/2022 289  193 - 986 pg/mL Final    Vitamin D 25-Hydroxy, Blood 11/03/2022 22.5  ng/mL Final    Vitamin D 25-OH Adult Reference Values:  Deficiency: <20 ng/mL  Insufficiency: 20 - <30 ng/mL  Sufficiency: 30 -100 ng/mL    Vitamin D 25-OH Pediatric Reference Values:  Deficiency: <15 ng/mL  Insufficiency: 15 - <20 ng/mL  Sufficiency: 20 - 100 ng/mL    WBC 11/03/2022 4.89  4.50 - 11.00 K/uL Final    RBC 11/03/2022 5.02  4.20 - 5.40 M/uL Final    Hemoglobin 11/03/2022 15.3  12.0 - 16.0 g/dL Final    Hematocrit 11/03/2022 46.1  38.0 - 47.0 % Final    MCV 11/03/2022 91.8  80.0 - 96.0 fL Final    MCH 11/03/2022 30.5  27.0 - 31.0 pg Final    MCHC 11/03/2022 33.2  32.0 - 36.0 g/dL Final    RDW 11/03/2022 14.0  11.5 - 14.5 % Final    Platelet Count 11/03/2022 283  150 - 400 K/uL Final    MPV 11/03/2022 10.8  9.4 - 12.4 fL Final    Neutrophils % 11/03/2022 59.3  53.0 - 65.0 % Final    Lymphocytes % 11/03/2022 25.4 (L)  27.0 - 41.0 % Final    Monocytes % 11/03/2022 8.2 (H)  2.0 - 6.0 %  Final    Eosinophils % 11/03/2022 6.3 (H)  1.0 - 4.0 % Final    Basophils % 11/03/2022 0.6  0.0 - 1.0 % Final    Immature Granulocytes % 11/03/2022 0.2  0.0 - 0.4 % Final    nRBC, Auto 11/03/2022 0.0  <=0.0 % Final    Neutrophils, Abs 11/03/2022 2.90  1.80 - 7.70 K/uL Final    Lymphocytes, Absolute 11/03/2022 1.24  1.00 - 4.80 K/uL Final    Monocytes, Absolute 11/03/2022 0.40  0.00 - 0.80 K/uL Final    Eosinophils, Absolute 11/03/2022 0.31  0.00 - 0.50 K/uL Final    Basophils, Absolute 11/03/2022 0.03  0.00 - 0.20 K/uL Final    Immature Granulocytes, Absolute 11/03/2022 0.01  0.00 - 0.04 K/uL Final    nRBC, Absolute 11/03/2022 0.00  <=0.00 x10e3/uL Final    Diff Type 11/03/2022 Auto   Final     X-Ray Chest PA And Lateral  Narrative: EXAMINATION:  XR CHEST PA AND LATERAL    CLINICAL HISTORY:  Shortness of breath    COMPARISON:  Chest x-ray August 26, 2021    TECHNIQUE:  Frontal and lateral views of the chest.    FINDINGS:  Heart size appears within normal limits.  Chronic change of the lungs without focal consolidation, pleural effusion, or pneumothorax.  Visualized osseous and surrounding soft tissue structures demonstrate no acute abnormality.  Impression: No acute cardiopulmonary process demonstrated.    Point of Service: [facility]    Electronically signed by: Aneesh Buckner  Date:    11/03/2022  Time:    11:21      Assessment and Plan (including Health Maintenance)      Problem List  Smart Sets  Document Outside HM   :    Plan: vit B12 and D supplementation.  RTC 2 weeks for another B12 shot        Health Maintenance Due   Topic Date Due    Hepatitis C Screening  Never done    TETANUS VACCINE  Never done    Shingles Vaccine (1 of 2) Never done       Problem List Items Addressed This Visit          ID    COVID-19 long hauler - Primary       Endocrine    Vitamin D deficiency    Relevant Medications    ergocalciferol (ERGOCALCIFEROL) 50,000 unit Cap    B12 deficiency     Other Visit Diagnoses        Wheezing        Relevant Medications    predniSONE (DELTASONE) 20 MG tablet            Health Maintenance Topics with due status: Not Due       Topic Last Completion Date    Colorectal Cancer Screening 06/25/2021    Lipid Panel 03/11/2022       Future Appointments   Date Time Provider Department Center   11/17/2022  9:00 AM Heath Doshi MD [facility]   3/17/2023 10:00 AM [facility]            Signature:  [facility]  MEDICAL GROUP OF [facility]  [address] [place] [address]  [phone]    Date of encounter: 11/10/22

## 2022-11-14 PROBLEM — E53.8 B12 DEFICIENCY: Status: ACTIVE | Noted: 2022-11-14

## 2022-11-30 DIAGNOSIS — K21.9 GASTROESOPHAGEAL REFLUX DISEASE WITHOUT ESOPHAGITIS: Primary | ICD-10-CM

## 2022-11-30 RX ORDER — FAMOTIDINE 20 MG/1
20 TABLET, FILM COATED ORAL 2 TIMES DAILY
Qty: 60 TABLET | Refills: 11 | Status: SHIPPED | OUTPATIENT
Start: 2022-11-30 | End: 2023-07-07 | Stop reason: SDUPTHER

## 2022-12-20 ENCOUNTER — OFFICE VISIT (OUTPATIENT)
Dept: FAMILY MEDICINE | Facility: CLINIC | Age: 52
End: 2022-12-20
Payer: COMMERCIAL

## 2022-12-20 VITALS
OXYGEN SATURATION: 98 % | HEART RATE: 76 BPM | RESPIRATION RATE: 17 BRPM | BODY MASS INDEX: 28.82 KG/M2 | TEMPERATURE: 98 F | SYSTOLIC BLOOD PRESSURE: 129 MMHG | WEIGHT: 162.63 LBS | DIASTOLIC BLOOD PRESSURE: 76 MMHG | HEIGHT: 63 IN

## 2022-12-20 DIAGNOSIS — R30.0 DYSURIA: ICD-10-CM

## 2022-12-20 DIAGNOSIS — N30.01 ACUTE CYSTITIS WITH HEMATURIA: ICD-10-CM

## 2022-12-20 DIAGNOSIS — M62.838 MUSCLE SPASM: Primary | ICD-10-CM

## 2022-12-20 LAB
BILIRUB SERPL-MCNC: NEGATIVE MG/DL
BLOOD URINE, POC: NORMAL
COLOR, POC UA: YELLOW
GLUCOSE UR QL STRIP: NEGATIVE
KETONES UR QL STRIP: NEGATIVE
LEUKOCYTE ESTERASE URINE, POC: NEGATIVE
NITRITE, POC UA: NEGATIVE
PH, POC UA: 5.5
PROTEIN, POC: NEGATIVE
SPECIFIC GRAVITY, POC UA: 1.02
UROBILINOGEN, POC UA: 0.2

## 2022-12-20 PROCEDURE — 96372 PR INJECTION,THERAP/PROPH/DIAG2ST, IM OR SUBCUT: ICD-10-PCS | Mod: ,,, | Performed by: FAMILY MEDICINE

## 2022-12-20 PROCEDURE — 99214 PR OFFICE/OUTPT VISIT, EST, LEVL IV, 30-39 MIN: ICD-10-PCS | Mod: 25,,, | Performed by: FAMILY MEDICINE

## 2022-12-20 PROCEDURE — 3078F DIAST BP <80 MM HG: CPT | Mod: ,,, | Performed by: FAMILY MEDICINE

## 2022-12-20 PROCEDURE — 1159F MED LIST DOCD IN RCRD: CPT | Mod: ,,, | Performed by: FAMILY MEDICINE

## 2022-12-20 PROCEDURE — 1160F RVW MEDS BY RX/DR IN RCRD: CPT | Mod: ,,, | Performed by: FAMILY MEDICINE

## 2022-12-20 PROCEDURE — 81003 POCT URINALYSIS W/O SCOPE: ICD-10-PCS | Mod: QW,,, | Performed by: FAMILY MEDICINE

## 2022-12-20 PROCEDURE — 3074F PR MOST RECENT SYSTOLIC BLOOD PRESSURE < 130 MM HG: ICD-10-PCS | Mod: ,,, | Performed by: FAMILY MEDICINE

## 2022-12-20 PROCEDURE — 3074F SYST BP LT 130 MM HG: CPT | Mod: ,,, | Performed by: FAMILY MEDICINE

## 2022-12-20 PROCEDURE — 3008F BODY MASS INDEX DOCD: CPT | Mod: ,,, | Performed by: FAMILY MEDICINE

## 2022-12-20 PROCEDURE — 1160F PR REVIEW ALL MEDS BY PRESCRIBER/CLIN PHARMACIST DOCUMENTED: ICD-10-PCS | Mod: ,,, | Performed by: FAMILY MEDICINE

## 2022-12-20 PROCEDURE — 3078F PR MOST RECENT DIASTOLIC BLOOD PRESSURE < 80 MM HG: ICD-10-PCS | Mod: ,,, | Performed by: FAMILY MEDICINE

## 2022-12-20 PROCEDURE — 1159F PR MEDICATION LIST DOCUMENTED IN MEDICAL RECORD: ICD-10-PCS | Mod: ,,, | Performed by: FAMILY MEDICINE

## 2022-12-20 PROCEDURE — 3008F PR BODY MASS INDEX (BMI) DOCUMENTED: ICD-10-PCS | Mod: ,,, | Performed by: FAMILY MEDICINE

## 2022-12-20 PROCEDURE — 96372 THER/PROPH/DIAG INJ SC/IM: CPT | Mod: ,,, | Performed by: FAMILY MEDICINE

## 2022-12-20 PROCEDURE — 81003 URINALYSIS AUTO W/O SCOPE: CPT | Mod: QW,,, | Performed by: FAMILY MEDICINE

## 2022-12-20 PROCEDURE — 99214 OFFICE O/P EST MOD 30 MIN: CPT | Mod: 25,,, | Performed by: FAMILY MEDICINE

## 2022-12-20 RX ORDER — DEXAMETHASONE SODIUM PHOSPHATE 4 MG/ML
4 INJECTION, SOLUTION INTRA-ARTICULAR; INTRALESIONAL; INTRAMUSCULAR; INTRAVENOUS; SOFT TISSUE
Status: COMPLETED | OUTPATIENT
Start: 2022-12-20 | End: 2022-12-20

## 2022-12-20 RX ORDER — PREDNISONE 20 MG/1
20 TABLET ORAL DAILY
Qty: 5 TABLET | Refills: 0 | Status: SHIPPED | OUTPATIENT
Start: 2022-12-20 | End: 2022-12-25

## 2022-12-20 RX ORDER — KETOROLAC TROMETHAMINE 30 MG/ML
60 INJECTION, SOLUTION INTRAMUSCULAR; INTRAVENOUS
Status: CANCELLED | OUTPATIENT
Start: 2022-12-20 | End: 2022-12-20

## 2022-12-20 RX ORDER — SULFAMETHOXAZOLE AND TRIMETHOPRIM 800; 160 MG/1; MG/1
1 TABLET ORAL 2 TIMES DAILY
Qty: 10 TABLET | Refills: 0 | Status: SHIPPED | OUTPATIENT
Start: 2022-12-20 | End: 2022-12-25

## 2022-12-20 RX ORDER — KETOROLAC TROMETHAMINE 30 MG/ML
60 INJECTION, SOLUTION INTRAMUSCULAR; INTRAVENOUS
Status: CANCELLED | OUTPATIENT
Start: 2022-12-20 | End: 2022-12-23

## 2022-12-20 RX ORDER — KETOROLAC TROMETHAMINE 30 MG/ML
60 INJECTION, SOLUTION INTRAMUSCULAR; INTRAVENOUS
Status: SHIPPED | OUTPATIENT
Start: 2022-12-20 | End: 2022-12-23

## 2022-12-20 RX ORDER — KETOROLAC TROMETHAMINE 30 MG/ML
60 INJECTION, SOLUTION INTRAMUSCULAR; INTRAVENOUS
Status: COMPLETED | OUTPATIENT
Start: 2022-12-20 | End: 2022-12-20

## 2022-12-20 RX ORDER — IBUPROFEN 800 MG/1
800 TABLET ORAL 3 TIMES DAILY PRN
Qty: 20 TABLET | Refills: 0 | Status: SHIPPED | OUTPATIENT
Start: 2022-12-20 | End: 2023-03-10

## 2022-12-20 RX ORDER — TIZANIDINE 4 MG/1
4 TABLET ORAL 3 TIMES DAILY PRN
Qty: 20 TABLET | Refills: 0 | Status: SHIPPED | OUTPATIENT
Start: 2022-12-20 | End: 2022-12-30

## 2022-12-20 RX ADMIN — KETOROLAC TROMETHAMINE 60 MG: 30 INJECTION, SOLUTION INTRAMUSCULAR; INTRAVENOUS at 09:12

## 2022-12-20 RX ADMIN — DEXAMETHASONE SODIUM PHOSPHATE 4 MG: 4 INJECTION, SOLUTION INTRA-ARTICULAR; INTRALESIONAL; INTRAMUSCULAR; INTRAVENOUS; SOFT TISSUE at 09:12

## 2022-12-20 NOTE — PROGRESS NOTES
Subjective:       Patient ID: Merissa Ochoa is a 52 y.o. female.    Chief Complaint: Back Pain (Lower back pain. Pain radiating down the right leg. Symptoms started 1 week ago. Hurts to put pressure on it. Pt has been rotating ice and heat to relive some of the pain. No injury. )    Pt from low back radiating down right leg along with increased urinary frequency.     Review of Systems   Constitutional:  Negative for activity change, appetite change, chills, diaphoresis, fatigue, fever and unexpected weight change.   HENT:  Negative for nasal congestion, dental problem, drooling, ear discharge, ear pain, facial swelling, hearing loss, mouth sores, nosebleeds, postnasal drip, rhinorrhea, sinus pressure/congestion, sneezing, sore throat, tinnitus, trouble swallowing, voice change and goiter.    Eyes:  Negative for photophobia, discharge, itching and visual disturbance.   Respiratory:  Negative for apnea, cough, choking, chest tightness, shortness of breath, wheezing and stridor.    Cardiovascular:  Negative for chest pain, palpitations, leg swelling and claudication.   Gastrointestinal:  Negative for abdominal distention, abdominal pain, anal bleeding, blood in stool, change in bowel habit, constipation, diarrhea, nausea, vomiting and change in bowel habit.   Endocrine: Negative for cold intolerance, heat intolerance, polydipsia, polyphagia and polyuria.   Genitourinary:  Positive for dysuria and frequency. Negative for bladder incontinence, decreased urine volume, difficulty urinating, enuresis, flank pain, hematuria, nocturia, pelvic pain and urgency.   Musculoskeletal:  Positive for arthralgias, back pain, leg pain and myalgias. Negative for gait problem, joint swelling, neck pain, neck stiffness and joint deformity.   Integumentary:  Negative for pallor, rash, wound, breast mass and breast tenderness.   Allergic/Immunologic: Negative for environmental allergies, food allergies and immunocompromised state.    Neurological:  Negative for dizziness, vertigo, tremors, seizures, syncope, facial asymmetry, speech difficulty, weakness, light-headedness, numbness, headaches, coordination difficulties, memory loss and coordination difficulties.   Hematological:  Negative for adenopathy. Does not bruise/bleed easily.   Psychiatric/Behavioral:  Negative for agitation, behavioral problems, confusion, decreased concentration, dysphoric mood, hallucinations, self-injury, sleep disturbance and suicidal ideas. The patient is not nervous/anxious and is not hyperactive.    Breast: Negative for mass and tenderness      Objective:      Physical Exam  Vitals reviewed.   Constitutional:       Appearance: Normal appearance.   HENT:      Head: Normocephalic and atraumatic.      Right Ear: Tympanic membrane, ear canal and external ear normal.      Left Ear: Tympanic membrane, ear canal and external ear normal.      Nose: Nose normal.      Mouth/Throat:      Mouth: Mucous membranes are moist.      Pharynx: Oropharynx is clear.   Eyes:      Extraocular Movements: Extraocular movements intact.      Conjunctiva/sclera: Conjunctivae normal.      Pupils: Pupils are equal, round, and reactive to light.   Cardiovascular:      Rate and Rhythm: Normal rate and regular rhythm.      Pulses: Normal pulses.      Heart sounds: Normal heart sounds.   Pulmonary:      Effort: Pulmonary effort is normal.      Breath sounds: Normal breath sounds.   Abdominal:      General: Bowel sounds are normal.      Palpations: Abdomen is soft.   Musculoskeletal:         General: Tenderness present. Normal range of motion.      Cervical back: Normal range of motion and neck supple.   Skin:     General: Skin is warm and dry.   Neurological:      General: No focal deficit present.      Mental Status: She is alert. Mental status is at baseline.   Psychiatric:         Mood and Affect: Mood normal.         Behavior: Behavior normal.         Thought Content: Thought content normal.          Judgment: Judgment normal.       Assessment:       1. Muscle spasm    2. Dysuria    3. Acute cystitis with hematuria          Plan:     Muscle spasm  -     dexAMETHasone injection 4 mg  -     ketorolac injection 60 mg    Dysuria  -     POCT URINALYSIS W/O SCOPE    Acute cystitis with hematuria  -     sulfamethoxazole-trimethoprim 800-160mg (BACTRIM DS) 800-160 mg Tab; Take 1 tablet by mouth 2 (two) times daily. for 5 days  Dispense: 10 tablet; Refill: 0    Other orders  -     predniSONE (DELTASONE) 20 MG tablet; Take 1 tablet (20 mg total) by mouth once daily. for 5 days  Dispense: 5 tablet; Refill: 0  -     tiZANidine (ZANAFLEX) 4 MG tablet; Take 1 tablet (4 mg total) by mouth 3 (three) times daily as needed (spasm).  Dispense: 20 tablet; Refill: 0  -     ibuprofen (ADVIL,MOTRIN) 800 MG tablet; Take 1 tablet (800 mg total) by mouth 3 (three) times daily as needed for Pain.  Dispense: 20 tablet; Refill: 0

## 2022-12-26 ENCOUNTER — HOSPITAL ENCOUNTER (EMERGENCY)
Facility: HOSPITAL | Age: 52
Discharge: HOME | End: 2022-12-26
Payer: COMMERCIAL

## 2022-12-26 VITALS
OXYGEN SATURATION: 96 % | SYSTOLIC BLOOD PRESSURE: 125 MMHG | WEIGHT: 156 LBS | HEIGHT: 63 IN | TEMPERATURE: 98 F | RESPIRATION RATE: 20 BRPM | BODY MASS INDEX: 27.64 KG/M2 | HEART RATE: 83 BPM | DIASTOLIC BLOOD PRESSURE: 83 MMHG

## 2022-12-26 DIAGNOSIS — M51.36 NARROWING OF LUMBAR INTERVERTEBRAL DISC SPACE: ICD-10-CM

## 2022-12-26 DIAGNOSIS — M54.16 ACUTE RIGHT LUMBAR RADICULOPATHY: Primary | ICD-10-CM

## 2022-12-26 LAB
BILIRUB UR QL STRIP: NEGATIVE
CLARITY UR: CLEAR
COLOR UR: COLORLESS
GLUCOSE UR STRIP-MCNC: NORMAL MG/DL
KETONES UR STRIP-SCNC: NEGATIVE MG/DL
LEUKOCYTE ESTERASE UR QL STRIP: NEGATIVE
NITRITE UR QL STRIP: NEGATIVE
PH UR STRIP: 5 PH UNITS
PROT UR QL STRIP: NEGATIVE
RBC # UR STRIP: NEGATIVE /UL
SP GR UR STRIP: 1.01
UROBILINOGEN UR STRIP-ACNC: NORMAL MG/DL

## 2022-12-26 PROCEDURE — 99284 EMERGENCY DEPT VISIT MOD MDM: CPT | Mod: ,,, | Performed by: NURSE PRACTITIONER

## 2022-12-26 PROCEDURE — 96372 THER/PROPH/DIAG INJ SC/IM: CPT | Performed by: NURSE PRACTITIONER

## 2022-12-26 PROCEDURE — 99284 EMERGENCY DEPT VISIT MOD MDM: CPT

## 2022-12-26 PROCEDURE — 81003 URINALYSIS AUTO W/O SCOPE: CPT | Performed by: NURSE PRACTITIONER

## 2022-12-26 PROCEDURE — 99284 PR EMERGENCY DEPT VISIT,LEVEL IV: ICD-10-PCS | Mod: ,,, | Performed by: NURSE PRACTITIONER

## 2022-12-26 PROCEDURE — 63600175 PHARM REV CODE 636 W HCPCS: Performed by: NURSE PRACTITIONER

## 2022-12-26 RX ORDER — METHYLPREDNISOLONE 4 MG/1
TABLET ORAL
Qty: 1 EACH | Refills: 0 | Status: SHIPPED | OUTPATIENT
Start: 2022-12-26 | End: 2023-01-16

## 2022-12-26 RX ORDER — DEXAMETHASONE SODIUM PHOSPHATE 4 MG/ML
4 INJECTION, SOLUTION INTRA-ARTICULAR; INTRALESIONAL; INTRAMUSCULAR; INTRAVENOUS; SOFT TISSUE
Status: COMPLETED | OUTPATIENT
Start: 2022-12-26 | End: 2022-12-26

## 2022-12-26 RX ORDER — IBUPROFEN 800 MG/1
800 TABLET ORAL 3 TIMES DAILY
Qty: 20 TABLET | Refills: 0 | Status: SHIPPED | OUTPATIENT
Start: 2022-12-26 | End: 2023-03-10

## 2022-12-26 RX ORDER — ORPHENADRINE CITRATE 30 MG/ML
60 INJECTION INTRAMUSCULAR; INTRAVENOUS
Status: COMPLETED | OUTPATIENT
Start: 2022-12-26 | End: 2022-12-26

## 2022-12-26 RX ORDER — TIZANIDINE 4 MG/1
4 TABLET ORAL EVERY 6 HOURS PRN
Qty: 20 TABLET | Refills: 0 | Status: SHIPPED | OUTPATIENT
Start: 2022-12-26 | End: 2023-01-05

## 2022-12-26 RX ADMIN — ORPHENADRINE CITRATE 60 MG: 30 INJECTION INTRAMUSCULAR; INTRAVENOUS at 01:12

## 2022-12-26 RX ADMIN — DEXAMETHASONE SODIUM PHOSPHATE 4 MG: 4 INJECTION, SOLUTION INTRA-ARTICULAR; INTRALESIONAL; INTRAMUSCULAR; INTRAVENOUS; SOFT TISSUE at 01:12

## 2022-12-26 NOTE — ED PROVIDER NOTES
Encounter Date: 12/26/2022       History     Chief Complaint   Patient presents with    Back Pain     Pt c/o right sided back pain that goes down the right leg. Was seen previously for same symptoms and given medicines.     Patient presents to ER with complaint of lower back pain that radiates into right hip and right thigh.  Patient states she was seen by [facility] week for same complaint and was treated for UTI also.  She states the dysuria is better, but the back and leg pain continue.  She is not able to get relief.  She has not lost control of bowel or bladder function.  Pain is interfering with her gait.      The history is provided by the patient and the spouse. No  was used.   Review of patient's allergies indicates:   Allergen Reactions    Betadine [povidone-iodine]     Propranolol     Iodinated contrast media Nausea And Vomiting    Lexapro [escitalopram] Nausea And Vomiting     Past Medical History:   Diagnosis Date    Anxiety attack     Diverticula, colon 6/25/2021    Family history of colonic polyps 6/25/2021    Polyp of descending colon 6/25/2021    Polyp of transverse colon 6/25/2021    Positive colorectal cancer screening using Cologuard test 6/25/2021     Past Surgical History:   Procedure Laterality Date    CHOLECYSTECTOMY      HYSTERECTOMY      TUBAL LIGATION       Family History   Problem Relation Age of Onset    Diabetes Mellitus Other     Heart disease Other     Hypertension Other     Mental illness Other     Ovarian cancer Other      Social History     Tobacco Use    Smoking status: Every Day     Types: Cigarettes    Smokeless tobacco: Never   Substance Use Topics    Alcohol use: Not Currently    Drug use: Not Currently     Review of Systems   Constitutional:  Positive for activity change and fatigue.   Musculoskeletal:  Positive for arthralgias, back pain and myalgias.        Lower back pain and right hip pain that radiates into right thigh   All other  systems reviewed and are negative.    Physical Exam     Initial Vitals   BP Pulse Resp Temp SpO2   12/26/22 1210 12/26/22 1210 12/26/22 1210 12/26/22 1211 12/26/22 1210   125/83 83 20 98.3 °F (36.8 °C) 96 %      MAP       --                Physical Exam    Nursing note and vitals reviewed.  Constitutional: She appears well-developed and well-nourished.   HENT:   Head: Normocephalic.   Right Ear: External ear normal.   Left Ear: External ear normal.   Nose: Nose normal.   Mouth/Throat: Oropharynx is clear and moist.   Eyes: Conjunctivae and EOM are normal. Pupils are equal, round, and reactive to light.   Neck: Neck supple.   Normal range of motion.  Cardiovascular:  Normal rate, regular rhythm, normal heart sounds and intact distal pulses.           Pulmonary/Chest: Breath sounds normal.   Abdominal: Abdomen is soft. Bowel sounds are normal.   Musculoskeletal:         General: Tenderness present. Normal range of motion.      Cervical back: Normal range of motion and neck supple.      Comments: Lumbar tenderness to palpation     Neurological: She is alert and oriented to person, place, and time. She has normal strength. GCS score is 15. GCS eye subscore is 4. GCS verbal subscore is 5. GCS motor subscore is 6.   Skin: Skin is warm and dry. Capillary refill takes less than 2 seconds.   Psychiatric: She has a normal mood and affect. Her behavior is normal. Judgment and thought content normal.       Medical Screening Exam   See Full Note    ED Course   Procedures  Labs Reviewed   URINALYSIS, REFLEX TO URINE CULTURE          Imaging Results              X-Ray Lumbar Spine Ap And Lateral (Final result)  Result time 12/26/22 12:48:33      Final result by Celso Jha DO (12/26/22 12:48:33)                   Impression:      No acute fracture.    No malalignment.    Mild multilevel degenerative disc disease. Mild endplate change and facet change.    Neural foraminal narrowing at L4-5 and L5-S1      Electronically  signed by: Celso Jha  Date:    12/26/2022  Time:    12:48               Narrative:    EXAMINATION:  XR LUMBAR SPINE AP AND LATERAL    CLINICAL HISTORY:  lumbar radiculopathy;.    TECHNIQUE:  XR LUMBAR SPINE AP AND LATERAL    COMPARISON:  2019    FINDINGS:  No acute fracture.    No malalignment.    Mild multilevel degenerative disc disease.  Mild endplate change and facet change.    Neural foraminal narrowing at L4-5 and L5-S1                                       Medications   orphenadrine injection 60 mg (60 mg Intramuscular Given 12/26/22 1335)   dexAMETHasone injection 4 mg (4 mg Intramuscular Given 12/26/22 1335)                       Clinical Impression:   Final diagnoses:  [M54.16] Acute right lumbar radiculopathy (Primary)  [M51.36] Narrowing of lumbar intervertebral disc space        ED Disposition Condition    Discharge Stable          ED Prescriptions       Medication Sig Dispense Start Date End Date Auth. Provider    methylPREDNISolone (MEDROL DOSEPACK) 4 mg tablet Take as prescribed. 1 each 12/26/2022 1/16/2023 ROSALIE Gamble    tiZANidine (ZANAFLEX) 4 MG tablet Take 1 tablet (4 mg total) by mouth every 6 (six) hours as needed (muscle spasm). 20 tablet 12/26/2022 1/5/2023 ROSALIE Gamble    ibuprofen (ADVIL,MOTRIN) 800 MG tablet Take 1 tablet (800 mg total) by mouth 3 (three) times daily. 20 tablet 12/26/2022 -- ROSALIE Gamble          Follow-up Information       Follow up With Specialties Details Why Contact Info    Heath Doshi MD Family Medicine Schedule an appointment as soon as possible for a visit in 2 days If symptoms worsen [facility] [address] [place] [id number]  [phone]               ROSALIE Gamble  12/26/22 7033

## 2023-01-03 DIAGNOSIS — F41.1 GAD (GENERALIZED ANXIETY DISORDER): Chronic | ICD-10-CM

## 2023-01-03 RX ORDER — ALPRAZOLAM 1 MG/1
TABLET ORAL
Qty: 90 TABLET | Refills: 1 | Status: SHIPPED | OUTPATIENT
Start: 2023-01-03 | End: 2023-03-10 | Stop reason: SDUPTHER

## 2023-01-25 DIAGNOSIS — M54.16 LUMBAR RADICULOPATHY: Primary | ICD-10-CM

## 2023-01-27 ENCOUNTER — OFFICE VISIT (OUTPATIENT)
Dept: SPINE | Facility: CLINIC | Age: 53
End: 2023-01-27
Payer: COMMERCIAL

## 2023-01-27 ENCOUNTER — HOSPITAL ENCOUNTER (OUTPATIENT)
Dept: RADIOLOGY | Facility: HOSPITAL | Age: 53
Discharge: HOME | End: 2023-01-27
Attending: ORTHOPAEDIC SURGERY
Payer: COMMERCIAL

## 2023-01-27 DIAGNOSIS — M50.30 DDD (DEGENERATIVE DISC DISEASE), CERVICAL: ICD-10-CM

## 2023-01-27 DIAGNOSIS — M54.16 LUMBAR RADICULOPATHY: ICD-10-CM

## 2023-01-27 DIAGNOSIS — F17.210 NICOTINE DEPENDENCE, CIGARETTES, UNCOMPLICATED: ICD-10-CM

## 2023-01-27 DIAGNOSIS — M43.16 SPONDYLOLISTHESIS, LUMBAR REGION: ICD-10-CM

## 2023-01-27 DIAGNOSIS — M51.36 DDD (DEGENERATIVE DISC DISEASE), LUMBAR: ICD-10-CM

## 2023-01-27 DIAGNOSIS — M54.16 LUMBAR RADICULOPATHY: Primary | ICD-10-CM

## 2023-01-27 DIAGNOSIS — M54.16 ACUTE RIGHT LUMBAR RADICULOPATHY: ICD-10-CM

## 2023-01-27 DIAGNOSIS — M54.12 CERVICAL RADICULOPATHY: ICD-10-CM

## 2023-01-27 PROCEDURE — 1159F PR MEDICATION LIST DOCUMENTED IN MEDICAL RECORD: ICD-10-PCS | Mod: ,,, | Performed by: ORTHOPAEDIC SURGERY

## 2023-01-27 PROCEDURE — 1159F MED LIST DOCD IN RCRD: CPT | Mod: ,,, | Performed by: ORTHOPAEDIC SURGERY

## 2023-01-27 PROCEDURE — 72110 XR LUMBAR SPINE 4-5 VIEW WITH BENDING VIEWS: ICD-10-PCS | Mod: 26,,, | Performed by: ORTHOPAEDIC SURGERY

## 2023-01-27 PROCEDURE — 99213 OFFICE O/P EST LOW 20 MIN: CPT | Mod: PBBFAC | Performed by: ORTHOPAEDIC SURGERY

## 2023-01-27 PROCEDURE — 99406 BEHAV CHNG SMOKING 3-10 MIN: CPT | Mod: S$PBB,,, | Performed by: ORTHOPAEDIC SURGERY

## 2023-01-27 PROCEDURE — 99204 OFFICE O/P NEW MOD 45 MIN: CPT | Mod: 25,S$PBB,, | Performed by: ORTHOPAEDIC SURGERY

## 2023-01-27 PROCEDURE — 99204 PR OFFICE/OUTPT VISIT, NEW, LEVL IV, 45-59 MIN: ICD-10-PCS | Mod: 25,S$PBB,, | Performed by: ORTHOPAEDIC SURGERY

## 2023-01-27 PROCEDURE — 72110 X-RAY EXAM L-2 SPINE 4/>VWS: CPT | Mod: 26,,, | Performed by: ORTHOPAEDIC SURGERY

## 2023-01-27 PROCEDURE — 72110 X-RAY EXAM L-2 SPINE 4/>VWS: CPT | Mod: TC

## 2023-01-27 PROCEDURE — 99406 PR TOBACCO USE CESSATION INTERMEDIATE 3-10 MINUTES: ICD-10-PCS | Mod: S$PBB,,, | Performed by: ORTHOPAEDIC SURGERY

## 2023-01-27 RX ORDER — GABAPENTIN 300 MG/1
300 CAPSULE ORAL 3 TIMES DAILY
Qty: 90 CAPSULE | Refills: 11 | Status: SHIPPED | OUTPATIENT
Start: 2023-01-27 | End: 2023-03-10

## 2023-01-27 NOTE — PROGRESS NOTES
Smoking Cessation counseling    Time spent:  3-10 minutes (38256)    We discussed the importance, over both the short and long-term, of smoking cessation; reviewed the patient's willingness to quit; overall history and current use of tobacco smoking products; that there are a variety of methods to help with smoking diminution and cessation (stopping alone, using nicotine substitution medications/gums, Chantix, other medications, etcetera, which the patient will also discuss with his or her primary care physician); that the patient should set a date for smoking cessation; and the patient will follow up with his or her primary care physician for further support and oversight.    We also discussed that for the patient to have surgery while using nicotine, wound healing may be compromised relative to those who do not smoke; that recovery from anesthesia may sometimes be more difficult; and that quitting may decrease the heart, vascular, pulmonary effects in the short term as well as over the long term.  Smoking cessation may be useful and important for any patient who will have a fusion as part of surgery or have bone or tissue that has regrown heal (bone fusions, wound closures, etc.)  since surgical results with respect to wound healing, susceptibility to recovery from infection, bone fusion, and tissue and blood vessel health may be impaired or less optimal in smokers than nonsmokers.  We talked about the elevated risk of surgical bone fusion failure in the setting of smoking and the potential need for a higher-risk revision surgery should fusion not occur.    I reviewed this with the patient in detail and all questions were answered.  We discussed nature of the patient's condition; real alternatives and realistic options; pros and cons, risks and benefits of all the options, in detail.  I believe the patient understands the above and wishes to proceed as outlined.

## 2023-01-27 NOTE — PROGRESS NOTES
AP, lateral, flexion/extension views of the lumbar spine reviewed    On the AP there is normal coronal alignment.  There are 5 non-rib-bearing lumbar vertebrae.  On the lateral there is decreased lumbar lordosis.  There is spondylotic disease with decreased disc height and osteophyte formation noted.  Grade 1 anterolisthesis at L5-S1.    Impression:  Spondylotic changes of the lumbar spine as noted above

## 2023-01-29 NOTE — PROGRESS NOTES
MDM/time:  Greater than 45 minutes spent on this encounter including 15 minutes reviewing imaging and notes, 20 minutes with the patient, 10 minutes documentation    ASSESSMENT:  52 y.o. female with lumbar spondylolisthesis and radiculopathy    PLAN:  Physical therapy.  Neurontin.  Follow-up in 3 months with x-ray cervical spine on return    HPI:  52 y.o. female here for evaluation of chronic low back pain that got worse on December 26, 2022.  Denies any injuries associated with the onset or worsening of her symptoms.  Reports 10/10 pain in the back that radiates into the right greater than left leg down the posterior buttocks to the knee.  Also reports right groin pain.  Reports that her left leg goes numb.  Also reports difficulty with some 4/10 neck and bilateral shoulder hand pain.  Reports tingling in the bilateral hands.  Reports some difficulty with left hand function and dexterity.  Reports some balance difficulty or falls.  Reports some occasional bowel or bladder issues.  She is able to walk about 30 minutes.  She is been taking ibuprofen, muscle relaxers, steroids.  Has not done physical therapy.  Has not had any recent pain management or injections.  Has not had an MRI cervical or lumbar.  No prior spine surgery.  She smokes a pack a day for 20 years.    IMAGING:  X-rays lumbar spine reviewed show:  On the AP there is normal coronal alignment.  There are 5 non-rib-bearing lumbar vertebrae.  On the lateral there is decreased lumbar lordosis.  There is spondylotic disease with decreased disc height and osteophyte formation noted.  Grade 1 anterolisthesis at L5-S1.    Past Medical History:   Diagnosis Date    Anxiety attack     Diverticula, colon 6/25/2021    Family history of colonic polyps 6/25/2021    Polyp of descending colon 6/25/2021    Polyp of transverse colon 6/25/2021    Positive colorectal cancer screening using Cologuard test 6/25/2021     Past Surgical History:   Procedure Laterality Date     CHOLECYSTECTOMY      HYSTERECTOMY      TUBAL LIGATION       Social History     Tobacco Use    Smoking status: Every Day     Types: Cigarettes    Smokeless tobacco: Never   Substance Use Topics    Alcohol use: Not Currently    Drug use: Not Currently      Current Outpatient Medications   Medication Instructions    albuterol (PROVENTIL/VENTOLIN HFA) 90 mcg/actuation inhaler 2 puffs, Inhalation, Every 6 hours, Rescue    albuterol (VENTOLIN HFA) 90 mcg/actuation inhaler 2 puffs, Inhalation, Every 6 hours PRN, Rescue    ALPRAZolam (XANAX) 1 MG tablet TAKE 1 TABLET BY MOUTH 3 TIMES DAILY *CAUSES DROWSINESS-AVOID ALCOHOL!!    ergocalciferol (ERGOCALCIFEROL) 50,000 Units, Oral, Every 7 days    famotidine (PEPCID) 20 mg, Oral, 2 times daily    FLUoxetine 40 mg, Oral, Daily    gabapentin (NEURONTIN) 300 mg, Oral, 3 times daily    ibuprofen (ADVIL,MOTRIN) 800 mg, Oral, 3 times daily PRN    ibuprofen (ADVIL,MOTRIN) 800 mg, Oral, 3 times daily    predniSONE (DELTASONE) 20 MG tablet 2 tablets PO x 5 days then 1 tablet PO x 5 days        EXAM:  Constitutional  General Appearance:  There is no height or weight on file to calculate BMI., NAD  Psychiatric   Orientation: Oriented to time, oriented to place, oriented to person  Mood and Affect: Active and alert, normal mood, normal affect  Gait and Station   Appearance:  Normal gait, difficulty with tandem gait, able to walk on toes, able to walk on heels  LUMBAR  Musculoskeletal System   Hips: Normal appearance, no leg length discrepancy, normal motion; left, normal motion; right    Lumbar Spine                   Inspection:  Normal alignment, normal sagittal balance                  Range of motion:  Painful decreased range of motion, worse with extension                  Bony Palpation of the Lumbar Spine:  No tenderness of the spinous process, no tenderness of the sacrum, no tenderness of the coccyx                  Bony Palpation of the Right Hip:  No tenderness of the iliac  crest, no tenderness of the sciatic notch, no tenderness of the SI joint                  Bony Palpation of the Left Hip:  No tenderness of the iliac crest, no tenderness of the sciatic notch, no tenderness of the SI joint                  Soft Tissue Palpation on the Right:  No tenderness of the paraspinal region, no tenderness of the iliolumbar region                  Soft Tissue Palpation on the Left:  No tenderness of the paraspinal region, no tenderness of the iliolumbar region    Motor Strength   L1 Right:  Hip flexion iliopsoas 5/5    L1 Left:  Hip flexion iliopsoas 5/5              L2-L4 Right:  Knee extension quadriceps 5/5, tibialis anterior 5/5              L2-L4 Left:  Knee extension quadriceps 5/5, tibialis anterior 5/5   L5 Right:  Extensor hallucis llongus 5/5,    L5 Left:  Extensor hallucis longus 5/5,    S1 Right:  Plantar flexion gastrocnemius 5/5   S1 Left:  Plantar flexion gastrocnemius 5/5    Neurological System   Ankle Reflex Right:  normal   Ankle Reflex Left: normal   Knee Reflex Right:  normal   Knee Reflex Left:  normal   Sensation on the Right:  L2 normal, L3 normal, L4 normal, L5 normal, S1 normal   Sensation on the Left:  L2 normal, L3 normal, L4 normal, L5 normal, S1 normal              Special Test on the Right:  Seated straight leg raising test negative, no clonus of the ankle              Special Test on the Left:  Seated straight leg raising test negative, no clonus of the ankle    Skin   Lumbosacral Spine:  Normal skin    Cardiovascular System   Arterial Pulses Right:  Posterior tibialis normal, dorsalis pedis normal   Arterial Pulses Left:  Posterior tibialis normal, dorsalis pedis normal   Edema Right: None   Edema Left:  None

## 2023-03-10 ENCOUNTER — OFFICE VISIT (OUTPATIENT)
Dept: FAMILY MEDICINE | Facility: CLINIC | Age: 53
End: 2023-03-10
Payer: COMMERCIAL

## 2023-03-10 VITALS
BODY MASS INDEX: 27.64 KG/M2 | DIASTOLIC BLOOD PRESSURE: 81 MMHG | SYSTOLIC BLOOD PRESSURE: 109 MMHG | WEIGHT: 156 LBS | HEART RATE: 76 BPM | HEIGHT: 63 IN

## 2023-03-10 DIAGNOSIS — M51.36 DDD (DEGENERATIVE DISC DISEASE), LUMBAR: Chronic | ICD-10-CM

## 2023-03-10 DIAGNOSIS — F41.1 GAD (GENERALIZED ANXIETY DISORDER): Chronic | ICD-10-CM

## 2023-03-10 DIAGNOSIS — R06.2 WHEEZING: ICD-10-CM

## 2023-03-10 DIAGNOSIS — F33.9 RECURRENT MAJOR DEPRESSIVE DISORDER, REMISSION STATUS UNSPECIFIED: Primary | Chronic | ICD-10-CM

## 2023-03-10 DIAGNOSIS — R06.02 SOB (SHORTNESS OF BREATH): Chronic | ICD-10-CM

## 2023-03-10 PROCEDURE — 99214 OFFICE O/P EST MOD 30 MIN: CPT | Mod: ,,, | Performed by: FAMILY MEDICINE

## 2023-03-10 PROCEDURE — 1160F PR REVIEW ALL MEDS BY PRESCRIBER/CLIN PHARMACIST DOCUMENTED: ICD-10-PCS | Mod: ,,, | Performed by: FAMILY MEDICINE

## 2023-03-10 PROCEDURE — 3008F BODY MASS INDEX DOCD: CPT | Mod: ,,, | Performed by: FAMILY MEDICINE

## 2023-03-10 PROCEDURE — 1160F RVW MEDS BY RX/DR IN RCRD: CPT | Mod: ,,, | Performed by: FAMILY MEDICINE

## 2023-03-10 PROCEDURE — 3074F PR MOST RECENT SYSTOLIC BLOOD PRESSURE < 130 MM HG: ICD-10-PCS | Mod: ,,, | Performed by: FAMILY MEDICINE

## 2023-03-10 PROCEDURE — 3008F PR BODY MASS INDEX (BMI) DOCUMENTED: ICD-10-PCS | Mod: ,,, | Performed by: FAMILY MEDICINE

## 2023-03-10 PROCEDURE — 3074F SYST BP LT 130 MM HG: CPT | Mod: ,,, | Performed by: FAMILY MEDICINE

## 2023-03-10 PROCEDURE — 1159F PR MEDICATION LIST DOCUMENTED IN MEDICAL RECORD: ICD-10-PCS | Mod: ,,, | Performed by: FAMILY MEDICINE

## 2023-03-10 PROCEDURE — 3079F DIAST BP 80-89 MM HG: CPT | Mod: ,,, | Performed by: FAMILY MEDICINE

## 2023-03-10 PROCEDURE — 1159F MED LIST DOCD IN RCRD: CPT | Mod: ,,, | Performed by: FAMILY MEDICINE

## 2023-03-10 PROCEDURE — 99214 PR OFFICE/OUTPT VISIT, EST, LEVL IV, 30-39 MIN: ICD-10-PCS | Mod: ,,, | Performed by: FAMILY MEDICINE

## 2023-03-10 PROCEDURE — 3079F PR MOST RECENT DIASTOLIC BLOOD PRESSURE 80-89 MM HG: ICD-10-PCS | Mod: ,,, | Performed by: FAMILY MEDICINE

## 2023-03-10 RX ORDER — ALBUTEROL SULFATE 90 UG/1
2 AEROSOL, METERED RESPIRATORY (INHALATION) EVERY 6 HOURS PRN
Qty: 8 G | Refills: 2 | Status: SHIPPED | OUTPATIENT
Start: 2023-03-10 | End: 2023-09-12 | Stop reason: ALTCHOICE

## 2023-03-10 RX ORDER — ALPRAZOLAM 1 MG/1
TABLET ORAL
Qty: 90 TABLET | Refills: 2 | Status: SHIPPED | OUTPATIENT
Start: 2023-03-10 | End: 2023-07-07 | Stop reason: SDUPTHER

## 2023-03-10 RX ORDER — FLUOXETINE HYDROCHLORIDE 40 MG/1
40 CAPSULE ORAL DAILY
Qty: 90 CAPSULE | Refills: 1 | Status: SHIPPED | OUTPATIENT
Start: 2023-03-10 | End: 2023-07-07 | Stop reason: SDUPTHER

## 2023-03-10 NOTE — PROGRESS NOTES
"   Heath Doshi MD   [facility]  [facility]  [address] [place] [address] [place] [id number]  [phone]      PATIENT NAME: Merissa Ochoa  : 1970  DATE: 3/10/23  MRN: [id number]      Billing Provider: Heath Doshi MD  Level of Service:   Patient PCP Information       Provider PCP Type    Heath Doshi MD General            Reason for Visit / Chief Complaint: Medication Refill       Update PCP  Update Chief Complaint         History of Present Illness / Problem Focused Workflow     Merissa Ochoa presents to the clinic with Medication Refill       51 yo WF here for follow up MDD, EAMON and SOB/wheezing due to COVID-19 long haul.  Since here last she has been seen several times for sciatica at [facility], ED and ortho/spine.  I have reviewed all of those encounter notes and imaging.  She was prescribed gabapentin but never started taking it due to being "scared of taking it."  She has not done PT.  Still has some mild symptoms but says that, overall, is much better.  Her xray Lumbar spine did show DDD/osteophytes.      Medication Refill  Associated symptoms include fatigue (chronic post COVID). Pertinent negatives include no abdominal pain, chest pain, chills, coughing, fever, sore throat or weakness.     Review of Systems     Review of Systems   Constitutional:  Positive for fatigue (chronic post COVID). Negative for activity change, chills and fever.   HENT:  Negative for sore throat.    Eyes:  Negative for pain.   Respiratory:  Positive for shortness of breath (chronic post COVID) and wheezing (chronic post COVID). Negative for cough and chest tightness.    Cardiovascular:  Negative for chest pain and palpitations.   Gastrointestinal:  Negative for abdominal pain.   Musculoskeletal:  Positive for back pain.   Neurological:  Negative for dizziness, syncope and weakness.   Psychiatric/Behavioral:  Negative for confusion.       Medical / Social / Family " History     Past Medical History:   Diagnosis Date    Anxiety attack     Diverticula, colon 6/25/2021    Family history of colonic polyps 6/25/2021    Polyp of descending colon 6/25/2021    Polyp of transverse colon 6/25/2021    Positive colorectal cancer screening using Cologuard test 6/25/2021       Past Surgical History:   Procedure Laterality Date    CHOLECYSTECTOMY      HYSTERECTOMY      TUBAL LIGATION         Social History    reports that she has been smoking cigarettes. She has never been exposed to tobacco smoke. She has never used smokeless tobacco. She reports that she does not currently use alcohol. She reports that she does not currently use drugs.   Social History     Tobacco Use    Smoking status: Every Day     Types: Cigarettes     Passive exposure: Never    Smokeless tobacco: Never   Substance Use Topics    Alcohol use: Not Currently    Drug use: Not Currently       Family History  Family History   Problem Relation Age of Onset    Diabetes Mellitus Other     Heart disease Other     Hypertension Other     Mental illness Other     Ovarian cancer Other        Medications and Allergies     Medications  Outpatient Medications Marked as Taking for the 3/10/23 encounter (Office Visit) with Heath Doshi MD   Medication Sig Dispense Refill    albuterol (PROVENTIL/VENTOLIN HFA) 90 mcg/actuation inhaler Inhale 2 puffs into the lungs every 6 (six) hours. Rescue 8 g 5    famotidine (PEPCID) 20 MG tablet Take 1 tablet (20 mg total) by mouth 2 (two) times daily. 60 tablet 11    [DISCONTINUED] albuterol (VENTOLIN HFA) 90 mcg/actuation inhaler Inhale 2 puffs into the lungs every 6 (six) hours as needed for Wheezing. Rescue 8 g 0    [DISCONTINUED] ALPRAZolam (XANAX) 1 MG tablet TAKE 1 TABLET BY MOUTH 3 TIMES DAILY *CAUSES DROWSINESS-AVOID ALCOHOL!! 90 tablet 1    [DISCONTINUED] FLUoxetine 40 MG capsule Take 1 capsule (40 mg total) by mouth once daily. 90 capsule 1       Allergies  Review of patient's allergies  indicates:   Allergen Reactions    Betadine [povidone-iodine]     Propranolol     Iodinated contrast media Nausea And Vomiting    Lexapro [escitalopram] Nausea And Vomiting       Physical Examination     Vitals:    03/10/23 0935   BP: 109/81   Pulse: 76     Physical Exam  Vitals reviewed.   Constitutional:       Appearance: Normal appearance.   HENT:      Head: Normocephalic and atraumatic.   Eyes:      Extraocular Movements: Extraocular movements intact.      Conjunctiva/sclera: Conjunctivae normal.      Pupils: Pupils are equal, round, and reactive to light.   Cardiovascular:      Rate and Rhythm: Normal rate and regular rhythm.      Heart sounds: Normal heart sounds.   Pulmonary:      Effort: Pulmonary effort is normal.      Breath sounds: Normal breath sounds.   Musculoskeletal:         General: Normal range of motion.      Cervical back: Normal range of motion.   Skin:     General: Skin is warm and dry.   Neurological:      General: No focal deficit present.      Mental Status: She is alert and oriented to person, place, and time.   Psychiatric:         Mood and Affect: Mood normal.         Behavior: Behavior normal.     X-Ray Lumbar Spine Ap And Lateral  Order: [id number]  Status: Final result     Visible to patient: No (inaccessible in Patient Portal)     Next appt: 03/17/2023 at 10:00 AM in Family Medicine (Heath Doshi MD)     0 Result Notes  Details    Reading Physician Reading Date Result Priority   Celso HARMON AbhijeetDO  [phone] 12/26/2022 STAT     Narrative & Impression  EXAMINATION:  XR LUMBAR SPINE AP AND LATERAL     CLINICAL HISTORY:  lumbar radiculopathy;.     TECHNIQUE:  XR LUMBAR SPINE AP AND LATERAL     COMPARISON:  2019     FINDINGS:  No acute fracture.     No malalignment.     Mild multilevel degenerative disc disease.  Mild endplate change and facet change.     Neural foraminal narrowing at L4-5 and L5-S1     Impression:     No acute fracture.     No malalignment.     Mild multilevel  degenerative disc disease. Mild endplate change and facet change.     Neural foraminal narrowing at L4-5 and L5-S1        Electronically signed by: Celso Jha  Date:                                            12/26/2022  Time:                                           12:48           Exam Ended: 12/26/22 12:46 Last Resulted: 12/26/22 12:48                   Assessment and Plan (including Health Maintenance)      Problem List  Smart Sets  Document Outside HM   :    Plan: recommended to patient that if she tarts to have a flare of sciatica to start gabapentin one capsule (300 mg) at bedtime and see how she does on that.  May further increase to BID if needed.  (She was prescribed TID)        Health Maintenance Due   Topic Date Due    Hepatitis C Screening  Never done    TETANUS VACCINE  Never done    Hemoglobin A1c (Diabetic Prevention Screening)  Never done    Shingles Vaccine (1 of 2) Never done       Problem List Items Addressed This Visit          Psychiatric    EAMON (generalized anxiety disorder) (Chronic)    Relevant Medications    ALPRAZolam (XANAX) 1 MG tablet    Recurrent major depressive disorder - Primary (Chronic)    Relevant Medications    FLUoxetine 40 MG capsule       Pulmonary    SOB (shortness of breath)     Other Visit Diagnoses       Wheezing        Relevant Medications    albuterol (VENTOLIN HFA) 90 mcg/actuation inhaler    DDD (degenerative disc disease), lumbar  (Chronic)               Health Maintenance Topics with due status: Not Due       Topic Last Completion Date    Colorectal Cancer Screening 06/25/2021    Lipid Panel 03/11/2022       Future Appointments   Date Time Provider Department Center   3/17/2023 10:00 AM Heath Doshi MD [facility]   4/28/2023  8:45 AM Miguel Unger MD [facility] SPINE [facility]            Signature:  Heath Doshi MD  [facility]  [facility] [place] [facility]  [address] [place] [address] [place]  [id number]  [phone]    Date of encounter: 3/10/23

## 2023-03-13 ENCOUNTER — PATIENT OUTREACH (OUTPATIENT)
Dept: ADMINISTRATIVE | Facility: HOSPITAL | Age: 53
End: 2023-03-13

## 2023-03-13 NOTE — PROGRESS NOTES
Population Health review of encounter with date of service 03/10/2023 with Dr. Doshi.  Does not qualify for  or [facility].  Has HY scheduled for 03/17/2023.

## 2023-03-17 ENCOUNTER — LAB VISIT (OUTPATIENT)
Dept: PRIMARY CARE CLINIC | Facility: CLINIC | Age: 53
End: 2023-03-17
Payer: COMMERCIAL

## 2023-03-17 DIAGNOSIS — Z02.83 ENCOUNTER FOR DRUG SCREENING: Primary | ICD-10-CM

## 2023-03-17 PROCEDURE — 99000 PR SPECIMEN HANDLING,DR OFF->LAB: ICD-10-PCS | Mod: ,,, | Performed by: NURSE PRACTITIONER

## 2023-03-17 PROCEDURE — 99000 SPECIMEN HANDLING OFFICE-LAB: CPT | Mod: ,,, | Performed by: NURSE PRACTITIONER

## 2023-03-19 ENCOUNTER — PATIENT OUTREACH (OUTPATIENT)
Dept: ADMINISTRATIVE | Facility: HOSPITAL | Age: 53
End: 2023-03-19

## 2023-03-19 NOTE — PROGRESS NOTES
Post visit Population Health review of appt scheduled 03/17/2023 with Dr. Doshi.  No show to appt scheduled as HY.  Added to Rosie ON for rescheduling.

## 2023-03-21 NOTE — PROGRESS NOTES
Subjective:       Patient ID: Merissa Ochoa is a 52 y.o. female.    Chief Complaint: No chief complaint on file.    HPI  Review of Systems      Objective:      Physical Exam    Assessment:       Problem List Items Addressed This Visit    None  Visit Diagnoses       Encounter for drug screening    -  Primary            Plan:       Drug testing only

## 2023-04-27 DIAGNOSIS — M54.12 CERVICAL RADICULOPATHY: Primary | ICD-10-CM

## 2023-05-11 ENCOUNTER — OFFICE VISIT (OUTPATIENT)
Dept: FAMILY MEDICINE | Facility: CLINIC | Age: 53
End: 2023-05-11
Payer: COMMERCIAL

## 2023-05-11 DIAGNOSIS — Z20.828 EXPOSURE TO VIRAL DISEASE: ICD-10-CM

## 2023-05-11 DIAGNOSIS — J06.9 UPPER RESPIRATORY TRACT INFECTION, UNSPECIFIED TYPE: Primary | ICD-10-CM

## 2023-05-11 PROCEDURE — 1160F PR REVIEW ALL MEDS BY PRESCRIBER/CLIN PHARMACIST DOCUMENTED: ICD-10-PCS | Mod: ,,, | Performed by: FAMILY MEDICINE

## 2023-05-11 PROCEDURE — 1159F PR MEDICATION LIST DOCUMENTED IN MEDICAL RECORD: ICD-10-PCS | Mod: ,,, | Performed by: FAMILY MEDICINE

## 2023-05-11 PROCEDURE — 87428 POCT SARS-COV2 (COVID) WITH FLU ANTIGEN: ICD-10-PCS | Mod: QW,,, | Performed by: FAMILY MEDICINE

## 2023-05-11 PROCEDURE — 1160F RVW MEDS BY RX/DR IN RCRD: CPT | Mod: ,,, | Performed by: FAMILY MEDICINE

## 2023-05-11 PROCEDURE — 99213 OFFICE O/P EST LOW 20 MIN: CPT | Mod: ,,, | Performed by: FAMILY MEDICINE

## 2023-05-11 PROCEDURE — 1159F MED LIST DOCD IN RCRD: CPT | Mod: ,,, | Performed by: FAMILY MEDICINE

## 2023-05-11 PROCEDURE — 99213 PR OFFICE/OUTPT VISIT, EST, LEVL III, 20-29 MIN: ICD-10-PCS | Mod: ,,, | Performed by: FAMILY MEDICINE

## 2023-05-11 PROCEDURE — 87428 SARSCOV & INF VIR A&B AG IA: CPT | Mod: QW,,, | Performed by: FAMILY MEDICINE

## 2023-05-11 RX ORDER — CEFDINIR 300 MG/1
300 CAPSULE ORAL 2 TIMES DAILY
Qty: 10 CAPSULE | Refills: 0 | Status: SHIPPED | OUTPATIENT
Start: 2023-05-11 | End: 2023-05-16

## 2023-05-11 NOTE — PROGRESS NOTES
Heath Doshi MD   [facility]  MEDICAL GROUP [facility] FAM[facility]  [address] [place] [address]  [phone]      PATIENT NAME: Merissa Ochoa  : 1970  DATE: 23  MRN: [id number]      Billing Provider: Heath Doshi MD  Level of Service:   Patient PCP Information       Provider PCP Type    Heath Doshi MD General            Reason for Visit / Chief Complaint: Cough, Headache, Nasal Congestion, and Other (Watery eyes)       Update PCP  Update Chief Complaint         History of Present Illness / Problem Focused Workflow     Merissa Ochoa presents to the clinic with Cough, Headache, Nasal Congestion, and Other (Watery eyes)       No relief with otc meds.      Review of Systems     Review of Systems   Constitutional:  Positive for fatigue.   HENT:  Positive for nasal congestion and rhinorrhea.    Respiratory:  Positive for cough.    Neurological:  Positive for headaches.      Medical / Social / Family History     Past Medical History:   Diagnosis Date    Anxiety attack     Diverticula, colon 2021    Family history of colonic polyps 2021    Polyp of descending colon 2021    Polyp of transverse colon 2021    Positive colorectal cancer screening using Cologuard test 2021       Past Surgical History:   Procedure Laterality Date    CHOLECYSTECTOMY      HYSTERECTOMY      TUBAL LIGATION         Social History    reports that she has been smoking cigarettes. She has never been exposed to tobacco smoke. She has never used smokeless tobacco. She reports that she does not currently use alcohol. She reports that she does not currently use drugs.   Social History     Tobacco Use    Smoking status: Every Day     Types: Cigarettes     Passive exposure: Never    Smokeless tobacco: Never   Substance Use Topics    Alcohol use: Not Currently    Drug use: Not Currently       Family History  Family History   Problem Relation Age of Onset    Diabetes  Mellitus Other     Heart disease Other     Hypertension Other     Mental illness Other     Ovarian cancer Other        Medications and Allergies     Medications  Outpatient Medications Marked as Taking for the 5/11/23 encounter (Office Visit) with Heath Doshi MD   Medication Sig Dispense Refill    albuterol (PROVENTIL/VENTOLIN HFA) 90 mcg/actuation inhaler Inhale 2 puffs into the lungs every 6 (six) hours. Rescue 8 g 5    albuterol (VENTOLIN HFA) 90 mcg/actuation inhaler Inhale 2 puffs into the lungs every 6 (six) hours as needed for Wheezing. Rescue 8 g 2    ALPRAZolam (XANAX) 1 MG tablet TAKE 1 TABLET BY MOUTH 3 TIMES DAILY *CAUSES DROWSINESS-AVOID ALCOHOL!! 90 tablet 2    famotidine (PEPCID) 20 MG tablet Take 1 tablet (20 mg total) by mouth 2 (two) times daily. 60 tablet 11    FLUoxetine 40 MG capsule Take 1 capsule (40 mg total) by mouth once daily. 90 capsule 1       Allergies  Review of patient's allergies indicates:   Allergen Reactions    Betadine [povidone-iodine]     Propranolol     Iodinated contrast media Nausea And Vomiting    Lexapro [escitalopram] Nausea And Vomiting       Physical Examination     Vitals:     Physical Exam  Vitals reviewed.   Constitutional:       Appearance: Normal appearance.   HENT:      Head: Normocephalic and atraumatic.   Eyes:      Extraocular Movements: Extraocular movements intact.      Conjunctiva/sclera: Conjunctivae normal.      Pupils: Pupils are equal, round, and reactive to light.   Cardiovascular:      Rate and Rhythm: Normal rate.   Pulmonary:      Effort: Pulmonary effort is normal.   Musculoskeletal:         General: Normal range of motion.      Cervical back: Normal range of motion.   Skin:     General: Skin is warm and dry.   Neurological:      General: No focal deficit present.      Mental Status: She is alert and oriented to person, place, and time.   Psychiatric:         Mood and Affect: Mood normal.         Behavior: Behavior normal.      Office Visit on  05/11/2023   Component Date Value Ref Range Status    SARS Coronavirus 2 Antigen 05/11/2023 Negative  Negative Final    Rapid Influenza A Ag 05/11/2023 Negative  Negative Final    Rapid Influenza B Ag 05/11/2023 Negative  Negative Final     Acceptable 05/11/2023 Yes   Final         Assessment and Plan (including Health Maintenance)      Problem List  Smart Sets  Document Outside HM   :    Plan:         Health Maintenance Due   Topic Date Due    Hepatitis C Screening  Never done    TETANUS VACCINE  Never done    Hemoglobin A1c (Diabetic Prevention Screening)  Never done    Shingles Vaccine (1 of 2) Never done       Problem List Items Addressed This Visit    None  Visit Diagnoses       Upper respiratory tract infection, unspecified type    -  Primary    Relevant Medications    cefdinir (OMNICEF) 300 MG capsule    Exposure to viral disease        Relevant Orders    POCT SARS-COV2 (COVID) with Flu Antigen (Completed)            Health Maintenance Topics with due status: Not Due       Topic Last Completion Date    Colorectal Cancer Screening 06/25/2021    Lipid Panel 03/11/2022       Future Appointments   Date Time Provider Department Center   5/31/2023 10:30 AM Heath Doshi MD [facility]            Signature:  [facility] PAL[facility]  MEDICAL GROUP OF [facility] FAMILY MEDICINE  [address] [place] [address] [place] [address]  [phone]    Date of encounter: 5/11/23

## 2023-05-11 NOTE — LETTER
May 11, 2023      [facility]  [address] [place] [address]  Phone: [phone]  Fax: [phone]       Patient: Merissa Ochoa   YOB: 1970  Date of Visit: 05/11/2023    To Whom It May Concern:    Marek Ochoa  was at [facility] on 05/11/2023. Please excuse from work.   The patient may return to work/school on 05/15/2023 with no restrictions. If you have any questions or concerns, or if I can be of further assistance, please do not hesitate to contact me.    Sincerely,    Heath Doshi MD

## 2023-05-14 ENCOUNTER — HOSPITAL ENCOUNTER (EMERGENCY)
Facility: HOSPITAL | Age: 53
Discharge: HOME | End: 2023-05-14
Payer: COMMERCIAL

## 2023-05-14 VITALS
DIASTOLIC BLOOD PRESSURE: 85 MMHG | WEIGHT: 156.81 LBS | RESPIRATION RATE: 18 BRPM | BODY MASS INDEX: 27.79 KG/M2 | HEIGHT: 63 IN | SYSTOLIC BLOOD PRESSURE: 145 MMHG | TEMPERATURE: 99 F | OXYGEN SATURATION: 97 % | HEART RATE: 80 BPM

## 2023-05-14 DIAGNOSIS — J20.9 ACUTE BRONCHITIS, UNSPECIFIED ORGANISM: Primary | ICD-10-CM

## 2023-05-14 PROCEDURE — 96372 THER/PROPH/DIAG INJ SC/IM: CPT | Performed by: NURSE PRACTITIONER

## 2023-05-14 PROCEDURE — 99284 EMERGENCY DEPT VISIT MOD MDM: CPT | Mod: ,,, | Performed by: NURSE PRACTITIONER

## 2023-05-14 PROCEDURE — 25000003 PHARM REV CODE 250: Performed by: NURSE PRACTITIONER

## 2023-05-14 PROCEDURE — 99284 EMERGENCY DEPT VISIT MOD MDM: CPT

## 2023-05-14 PROCEDURE — 63600175 PHARM REV CODE 636 W HCPCS: Performed by: NURSE PRACTITIONER

## 2023-05-14 PROCEDURE — 99284 PR EMERGENCY DEPT VISIT,LEVEL IV: ICD-10-PCS | Mod: ,,, | Performed by: NURSE PRACTITIONER

## 2023-05-14 RX ORDER — DEXAMETHASONE SODIUM PHOSPHATE 4 MG/ML
8 INJECTION, SOLUTION INTRA-ARTICULAR; INTRALESIONAL; INTRAMUSCULAR; INTRAVENOUS; SOFT TISSUE
Status: COMPLETED | OUTPATIENT
Start: 2023-05-14 | End: 2023-05-14

## 2023-05-14 RX ORDER — LIDOCAINE HYDROCHLORIDE 10 MG/ML
2.1 INJECTION INFILTRATION; PERINEURAL
Status: COMPLETED | OUTPATIENT
Start: 2023-05-14 | End: 2023-05-14

## 2023-05-14 RX ORDER — PREDNISONE 20 MG/1
40 TABLET ORAL DAILY
Qty: 10 TABLET | Refills: 0 | Status: SHIPPED | OUTPATIENT
Start: 2023-05-14 | End: 2023-05-19

## 2023-05-14 RX ORDER — CEFTRIAXONE 1 G/1
1 INJECTION, POWDER, FOR SOLUTION INTRAMUSCULAR; INTRAVENOUS
Status: COMPLETED | OUTPATIENT
Start: 2023-05-14 | End: 2023-05-14

## 2023-05-14 RX ORDER — AZITHROMYCIN 250 MG/1
250 TABLET, FILM COATED ORAL DAILY
Qty: 6 TABLET | Refills: 0 | Status: SHIPPED | OUTPATIENT
Start: 2023-05-14 | End: 2023-07-07 | Stop reason: ALTCHOICE

## 2023-05-14 RX ADMIN — CEFTRIAXONE SODIUM 1 G: 1 INJECTION, POWDER, FOR SOLUTION INTRAMUSCULAR; INTRAVENOUS at 10:05

## 2023-05-14 RX ADMIN — DEXAMETHASONE SODIUM PHOSPHATE 8 MG: 4 INJECTION, SOLUTION INTRA-ARTICULAR; INTRALESIONAL; INTRAMUSCULAR; INTRAVENOUS; SOFT TISSUE at 10:05

## 2023-05-14 RX ADMIN — LIDOCAINE HYDROCHLORIDE 2.1 ML: 10 INJECTION, SOLUTION INFILTRATION; PERINEURAL at 10:05

## 2023-05-15 NOTE — ED TRIAGE NOTES
Cough, sinus congestion, and headache for a week. Seen Dr Doshi on Thursday, flu and covid were negative. Gave antibotics started Thursday night.

## 2023-05-15 NOTE — DISCHARGE INSTRUCTIONS
Take Azithromycin and prednisone as prescribed for all 5 days. Take Mucinex 600-1200 mg twice a day for sinus congestion and drainage. Use Flonase nasal spray 1 spray to each nostril daily. Use your albuterol inhaler 2 puffs every 4 hours for 3 days then every 6 hours for 3 days then twice a day and as needed for cough and/or wheezing. Take Tylenol or Ibuprofen as needed for pain. Follow-up with your PCP if not getting better in 1 week. Return to the ED for worsening symptoms such as fever, chest pain and dyspnea.

## 2023-07-07 ENCOUNTER — OFFICE VISIT (OUTPATIENT)
Dept: FAMILY MEDICINE | Facility: CLINIC | Age: 53
End: 2023-07-07
Payer: COMMERCIAL

## 2023-07-07 VITALS
BODY MASS INDEX: 27.64 KG/M2 | HEART RATE: 71 BPM | SYSTOLIC BLOOD PRESSURE: 139 MMHG | WEIGHT: 156 LBS | HEIGHT: 63 IN | DIASTOLIC BLOOD PRESSURE: 83 MMHG

## 2023-07-07 DIAGNOSIS — F41.1 GAD (GENERALIZED ANXIETY DISORDER): Chronic | ICD-10-CM

## 2023-07-07 DIAGNOSIS — F33.9 RECURRENT MAJOR DEPRESSIVE DISORDER, REMISSION STATUS UNSPECIFIED: Chronic | ICD-10-CM

## 2023-07-07 DIAGNOSIS — K21.9 GASTROESOPHAGEAL REFLUX DISEASE WITHOUT ESOPHAGITIS: ICD-10-CM

## 2023-07-07 DIAGNOSIS — Z79.899 ENCOUNTER FOR LONG-TERM (CURRENT) USE OF MEDICATIONS: Primary | ICD-10-CM

## 2023-07-07 LAB
CTP QC/QA: YES
POC (AMP) AMPHETAMINE: NEGATIVE
POC (BAR) BARBITURATES: NEGATIVE
POC (BUP) BUPRENORPHINE: NEGATIVE
POC (BZO) BENZODIAZEPINES: ABNORMAL
POC (COC) COCAINE: NEGATIVE
POC (MDMA) METHYLENEDIOXYMETHAMPHETAMINE 3,4: NEGATIVE
POC (MET) METHAMPHETAMINE: NEGATIVE
POC (MOP) OPIATES: NEGATIVE
POC (MTD) METHADONE: NEGATIVE
POC (OXY) OXYCODONE: NEGATIVE
POC (PCP) PHENCYCLIDINE: NEGATIVE
POC (TCA) TRICYCLIC ANTIDEPRESSANTS: NEGATIVE
POC TEMPERATURE (URINE): 92
POC THC: NEGATIVE

## 2023-07-07 PROCEDURE — 3008F PR BODY MASS INDEX (BMI) DOCUMENTED: ICD-10-PCS | Mod: CPTII,,, | Performed by: NURSE PRACTITIONER

## 2023-07-07 PROCEDURE — 99214 OFFICE O/P EST MOD 30 MIN: CPT | Mod: ,,, | Performed by: NURSE PRACTITIONER

## 2023-07-07 PROCEDURE — 3075F SYST BP GE 130 - 139MM HG: CPT | Mod: CPTII,,, | Performed by: NURSE PRACTITIONER

## 2023-07-07 PROCEDURE — 3008F BODY MASS INDEX DOCD: CPT | Mod: CPTII,,, | Performed by: NURSE PRACTITIONER

## 2023-07-07 PROCEDURE — 80305 POCT URINE DRUG SCREEN PRESUMP: ICD-10-PCS | Mod: QW,,, | Performed by: NURSE PRACTITIONER

## 2023-07-07 PROCEDURE — 3079F DIAST BP 80-89 MM HG: CPT | Mod: CPTII,,, | Performed by: NURSE PRACTITIONER

## 2023-07-07 PROCEDURE — 3079F PR MOST RECENT DIASTOLIC BLOOD PRESSURE 80-89 MM HG: ICD-10-PCS | Mod: CPTII,,, | Performed by: NURSE PRACTITIONER

## 2023-07-07 PROCEDURE — 80305 DRUG TEST PRSMV DIR OPT OBS: CPT | Mod: QW,,, | Performed by: NURSE PRACTITIONER

## 2023-07-07 PROCEDURE — 3075F PR MOST RECENT SYSTOLIC BLOOD PRESS GE 130-139MM HG: ICD-10-PCS | Mod: CPTII,,, | Performed by: NURSE PRACTITIONER

## 2023-07-07 PROCEDURE — 99214 PR OFFICE/OUTPT VISIT, EST, LEVL IV, 30-39 MIN: ICD-10-PCS | Mod: ,,, | Performed by: NURSE PRACTITIONER

## 2023-07-07 RX ORDER — ALPRAZOLAM 1 MG/1
TABLET ORAL
Qty: 90 TABLET | Refills: 0 | Status: SHIPPED | OUTPATIENT
Start: 2023-07-07 | End: 2023-08-08 | Stop reason: SDUPTHER

## 2023-07-07 RX ORDER — FAMOTIDINE 20 MG/1
20 TABLET, FILM COATED ORAL 2 TIMES DAILY
Qty: 60 TABLET | Refills: 0 | Status: SHIPPED | OUTPATIENT
Start: 2023-07-07 | End: 2023-07-10 | Stop reason: SDUPTHER

## 2023-07-07 RX ORDER — ALPRAZOLAM 1 MG/1
TABLET ORAL
Qty: 90 TABLET | Refills: 0 | Status: CANCELLED | OUTPATIENT
Start: 2023-07-07

## 2023-07-07 RX ORDER — FLUOXETINE HYDROCHLORIDE 40 MG/1
40 CAPSULE ORAL DAILY
Qty: 90 CAPSULE | Refills: 0 | Status: SHIPPED | OUTPATIENT
Start: 2023-07-07 | End: 2023-07-10 | Stop reason: SDUPTHER

## 2023-07-07 NOTE — PROGRESS NOTES
"Subjective:       Patient ID: Merissa Ochoa is a 52 y.o. female.    Chief Complaint: Medication Refill    HPI    Patient presents requesting routine medication refills    PCP Dr. Doshi out of clinic today    MS  report reviewed; Last fill of Xanax 05/15/23  Takes Xanax daily; however she may not take it TID as prescribed every day due to work  Last took 1/2 tablet Xanax around midnight last night    Denies acute complaint today    /83 (BP Location: Left arm, Patient Position: Sitting)   Pulse 71   Ht 5' 3" (1.6 m)   Wt 70.8 kg (156 lb)   LMP  (LMP Unknown)   BMI 27.63 kg/m²     Medication List with Changes/Refills   Current Medications    ALBUTEROL (PROVENTIL/VENTOLIN HFA) 90 MCG/ACTUATION INHALER    Inhale 2 puffs into the lungs every 6 (six) hours. Rescue    ALBUTEROL (VENTOLIN HFA) 90 MCG/ACTUATION INHALER    Inhale 2 puffs into the lungs every 6 (six) hours as needed for Wheezing. Rescue   Changed and/or Refilled Medications    Modified Medication Previous Medication    ALPRAZOLAM (XANAX) 1 MG TABLET ALPRAZolam (XANAX) 1 MG tablet       TAKE 1 TABLET BY MOUTH 3 TIMES DAILY *CAUSES DROWSINESS-AVOID ALCOHOL!!    TAKE 1 TABLET BY MOUTH 3 TIMES DAILY *CAUSES DROWSINESS-AVOID ALCOHOL!!    FAMOTIDINE (PEPCID) 20 MG TABLET famotidine (PEPCID) 20 MG tablet       Take 1 tablet (20 mg total) by mouth 2 (two) times daily.    Take 1 tablet (20 mg total) by mouth 2 (two) times daily.    FLUOXETINE 40 MG CAPSULE FLUoxetine 40 MG capsule       Take 1 capsule (40 mg total) by mouth once daily.    Take 1 capsule (40 mg total) by mouth once daily.   Discontinued Medications    AZITHROMYCIN (Z-CHARLIE) 250 MG TABLET    Take 1 tablet (250 mg total) by mouth once daily. Take first 2 tablets together, then 1 every day until finished.       Review of Systems   Constitutional:  Negative for fatigue and fever.   Respiratory:  Negative for cough and shortness of breath.    Cardiovascular:  Negative for chest pain. "   Neurological:  Negative for headaches.   Psychiatric/Behavioral:  Negative for agitation, behavioral problems and sleep disturbance. The patient is not nervous/anxious.        Objective:      Physical Exam  Vitals and nursing note reviewed.   Constitutional:       General: She is not in acute distress.     Appearance: Normal appearance.   HENT:      Head: Normocephalic.   Eyes:      Conjunctiva/sclera: Conjunctivae normal.   Neck:      Thyroid: No thyromegaly.      Trachea: Trachea normal.   Cardiovascular:      Rate and Rhythm: Normal rate and regular rhythm.      Pulses: Normal pulses.      Heart sounds: Normal heart sounds.   Pulmonary:      Effort: Pulmonary effort is normal. No respiratory distress.      Breath sounds: Normal breath sounds.   Musculoskeletal:      Cervical back: Neck supple.   Skin:     General: Skin is warm and dry.   Neurological:      General: No focal deficit present.      Mental Status: She is alert and oriented to person, place, and time.   Psychiatric:         Mood and Affect: Mood normal.         Behavior: Behavior normal.       Assessment:       1. Encounter for long-term (current) use of medications    2. EAMON (generalized anxiety disorder)    3. Gastroesophageal reflux disease without esophagitis    4. Recurrent major depressive disorder, remission status unspecified        Plan:       Patient Instructions   Medication refills per patient request  UDS and MS  consistent with patient reported history    Follow up with PCP 3 months and PRN   Encounter for long-term (current) use of medications  -     POCT Urine Drug Screen Presump    EAMON (generalized anxiety disorder)  -     ALPRAZolam (XANAX) 1 MG tablet; TAKE 1 TABLET BY MOUTH 3 TIMES DAILY *CAUSES DROWSINESS-AVOID ALCOHOL!!  Dispense: 90 tablet; Refill: 0    Gastroesophageal reflux disease without esophagitis  -     famotidine (PEPCID) 20 MG tablet; Take 1 tablet (20 mg total) by mouth 2 (two) times daily.  Dispense: 60 tablet;  Refill: 0    Recurrent major depressive disorder, remission status unspecified  -     FLUoxetine 40 MG capsule; Take 1 capsule (40 mg total) by mouth once daily.  Dispense: 90 capsule; Refill: 0       I have reviewed the encounter and agree with the assessment and plan as put forth by the Nurse Practitioner.

## 2023-07-07 NOTE — PATIENT INSTRUCTIONS
Medication refills per patient request  UDS and MS  consistent with patient reported history    Follow up with PCP 3 months and PRN

## 2023-07-10 DIAGNOSIS — K21.9 GASTROESOPHAGEAL REFLUX DISEASE WITHOUT ESOPHAGITIS: ICD-10-CM

## 2023-07-10 DIAGNOSIS — F33.9 RECURRENT MAJOR DEPRESSIVE DISORDER, REMISSION STATUS UNSPECIFIED: Chronic | ICD-10-CM

## 2023-07-10 RX ORDER — FAMOTIDINE 20 MG/1
20 TABLET, FILM COATED ORAL 2 TIMES DAILY
Qty: 60 TABLET | Refills: 0 | Status: SHIPPED | OUTPATIENT
Start: 2023-07-10 | End: 2023-09-12 | Stop reason: SDUPTHER

## 2023-07-10 RX ORDER — FLUOXETINE HYDROCHLORIDE 40 MG/1
40 CAPSULE ORAL DAILY
Qty: 90 CAPSULE | Refills: 0 | Status: SHIPPED | OUTPATIENT
Start: 2023-07-10 | End: 2023-09-12 | Stop reason: SDUPTHER

## 2023-08-08 DIAGNOSIS — F41.1 GAD (GENERALIZED ANXIETY DISORDER): Chronic | ICD-10-CM

## 2023-08-08 RX ORDER — ALPRAZOLAM 1 MG/1
TABLET ORAL
Qty: 90 TABLET | Refills: 0 | Status: SHIPPED | OUTPATIENT
Start: 2023-08-08 | End: 2023-09-12 | Stop reason: SDUPTHER

## 2023-09-12 ENCOUNTER — OFFICE VISIT (OUTPATIENT)
Dept: FAMILY MEDICINE | Facility: CLINIC | Age: 53
End: 2023-09-12
Payer: COMMERCIAL

## 2023-09-12 VITALS
DIASTOLIC BLOOD PRESSURE: 84 MMHG | BODY MASS INDEX: 27.64 KG/M2 | HEIGHT: 63 IN | SYSTOLIC BLOOD PRESSURE: 122 MMHG | WEIGHT: 156 LBS | HEART RATE: 71 BPM

## 2023-09-12 DIAGNOSIS — J32.9 SINUSITIS, UNSPECIFIED CHRONICITY, UNSPECIFIED LOCATION: Chronic | ICD-10-CM

## 2023-09-12 DIAGNOSIS — J40 BRONCHITIS: Chronic | ICD-10-CM

## 2023-09-12 DIAGNOSIS — F41.1 GAD (GENERALIZED ANXIETY DISORDER): Primary | Chronic | ICD-10-CM

## 2023-09-12 DIAGNOSIS — F33.9 RECURRENT MAJOR DEPRESSIVE DISORDER, REMISSION STATUS UNSPECIFIED: Chronic | ICD-10-CM

## 2023-09-12 DIAGNOSIS — K21.9 GASTROESOPHAGEAL REFLUX DISEASE WITHOUT ESOPHAGITIS: Chronic | ICD-10-CM

## 2023-09-12 PROCEDURE — 3079F DIAST BP 80-89 MM HG: CPT | Mod: CPTII,,, | Performed by: FAMILY MEDICINE

## 2023-09-12 PROCEDURE — 99214 OFFICE O/P EST MOD 30 MIN: CPT | Mod: ,,, | Performed by: FAMILY MEDICINE

## 2023-09-12 PROCEDURE — 1159F MED LIST DOCD IN RCRD: CPT | Mod: CPTII,,, | Performed by: FAMILY MEDICINE

## 2023-09-12 PROCEDURE — 3074F PR MOST RECENT SYSTOLIC BLOOD PRESSURE < 130 MM HG: ICD-10-PCS | Mod: CPTII,,, | Performed by: FAMILY MEDICINE

## 2023-09-12 PROCEDURE — 3008F PR BODY MASS INDEX (BMI) DOCUMENTED: ICD-10-PCS | Mod: CPTII,,, | Performed by: FAMILY MEDICINE

## 2023-09-12 PROCEDURE — 1160F PR REVIEW ALL MEDS BY PRESCRIBER/CLIN PHARMACIST DOCUMENTED: ICD-10-PCS | Mod: CPTII,,, | Performed by: FAMILY MEDICINE

## 2023-09-12 PROCEDURE — 3008F BODY MASS INDEX DOCD: CPT | Mod: CPTII,,, | Performed by: FAMILY MEDICINE

## 2023-09-12 PROCEDURE — 3079F PR MOST RECENT DIASTOLIC BLOOD PRESSURE 80-89 MM HG: ICD-10-PCS | Mod: CPTII,,, | Performed by: FAMILY MEDICINE

## 2023-09-12 PROCEDURE — 3074F SYST BP LT 130 MM HG: CPT | Mod: CPTII,,, | Performed by: FAMILY MEDICINE

## 2023-09-12 PROCEDURE — 1160F RVW MEDS BY RX/DR IN RCRD: CPT | Mod: CPTII,,, | Performed by: FAMILY MEDICINE

## 2023-09-12 PROCEDURE — 99214 PR OFFICE/OUTPT VISIT, EST, LEVL IV, 30-39 MIN: ICD-10-PCS | Mod: ,,, | Performed by: FAMILY MEDICINE

## 2023-09-12 PROCEDURE — 1159F PR MEDICATION LIST DOCUMENTED IN MEDICAL RECORD: ICD-10-PCS | Mod: CPTII,,, | Performed by: FAMILY MEDICINE

## 2023-09-12 RX ORDER — FLUTICASONE PROPIONATE 50 MCG
2 SPRAY, SUSPENSION (ML) NASAL DAILY
Qty: 16 G | Refills: 2 | Status: SHIPPED | OUTPATIENT
Start: 2023-09-12

## 2023-09-12 RX ORDER — ALPRAZOLAM 1 MG/1
TABLET ORAL
Qty: 90 TABLET | Refills: 2 | Status: SHIPPED | OUTPATIENT
Start: 2023-09-12 | End: 2023-12-13 | Stop reason: SDUPTHER

## 2023-09-12 RX ORDER — FAMOTIDINE 20 MG/1
20 TABLET, FILM COATED ORAL 2 TIMES DAILY
Qty: 60 TABLET | Refills: 2 | Status: SHIPPED | OUTPATIENT
Start: 2023-09-12 | End: 2024-09-11

## 2023-09-12 RX ORDER — ALBUTEROL SULFATE 90 UG/1
2 AEROSOL, METERED RESPIRATORY (INHALATION) EVERY 6 HOURS PRN
Qty: 18 G | Refills: 0 | Status: SHIPPED | OUTPATIENT
Start: 2023-09-12 | End: 2023-12-13 | Stop reason: SDUPTHER

## 2023-09-12 RX ORDER — FLUOXETINE HYDROCHLORIDE 40 MG/1
40 CAPSULE ORAL DAILY
Qty: 90 CAPSULE | Refills: 0 | Status: SHIPPED | OUTPATIENT
Start: 2023-09-12 | End: 2023-12-13 | Stop reason: SDUPTHER

## 2023-09-12 NOTE — PROGRESS NOTES
Heath Doshi MD   [facility]  MEDICAL GROUP [facility] - FAMILY MEDICINE  [address] [place] [address]  [phone]      PATIENT NAME: Merissa Ochoa  : 1970  DATE: 23  MRN: [id number]      Billing Provider: Heath Doshi MD  Level of Service:   Patient PCP Information       Provider PCP Type    Heath Doshi MD General            Reason for Visit / Chief Complaint: Medication Refill       Update PCP  Update Chief Complaint         History of Present Illness / Problem Focused Workflow     Merissa Ochoa presents to the clinic with Medication Refill       Has appointment scheduled to see rheumatologist in [place].    Medication Refill  Associated symptoms include fatigue (chronic post COVID). Pertinent negatives include no abdominal pain, chest pain, chills, coughing, fever, sore throat or weakness.     Review of Systems     Review of Systems   Constitutional:  Positive for fatigue (chronic post COVID). Negative for activity change, chills and fever.   HENT:  Negative for sore throat.    Eyes:  Negative for pain.   Respiratory:  Positive for shortness of breath (chronic post COVID) and wheezing (chronic post COVID). Negative for cough and chest tightness.    Cardiovascular:  Negative for chest pain and palpitations.   Gastrointestinal:  Negative for abdominal pain.   Musculoskeletal:  Positive for back pain.   Neurological:  Negative for dizziness, syncope and weakness.   Psychiatric/Behavioral:  Negative for confusion. The patient is nervous/anxious.       Medical / Social / Family History     Past Medical History:   Diagnosis Date    Anxiety attack     Diverticula, colon 2021    Family history of colonic polyps 2021    Polyp of descending colon 2021    Polyp of transverse colon 2021    Positive colorectal cancer screening using Cologuard test 2021       Past Surgical History:   Procedure Laterality Date    CHOLECYSTECTOMY       HYSTERECTOMY      TUBAL LIGATION         Social History    reports that she has been smoking cigarettes. She has never been exposed to tobacco smoke. She has never used smokeless tobacco. She reports that she does not currently use alcohol. She reports that she does not currently use drugs.   Social History     Tobacco Use    Smoking status: Every Day     Types: Cigarettes     Passive exposure: Never    Smokeless tobacco: Never   Substance Use Topics    Alcohol use: Not Currently    Drug use: Not Currently       Family History  Family History   Problem Relation Age of Onset    Diabetes Mellitus Other     Heart disease Other     Hypertension Other     Mental illness Other     Ovarian cancer Other        Medications and Allergies     Medications  No outpatient medications have been marked as taking for the 9/12/23 encounter (Office Visit) with Heath Doshi MD.       Allergies  Review of patient's allergies indicates:   Allergen Reactions    Betadine [povidone-iodine]     Propranolol     Iodinated contrast media Nausea And Vomiting    Lexapro [escitalopram] Nausea And Vomiting       Physical Examination     Vitals:    09/12/23 1551   BP: 122/84   Pulse: 71     Physical Exam  Vitals reviewed.   Constitutional:       Appearance: Normal appearance.   HENT:      Head: Normocephalic and atraumatic.   Eyes:      Extraocular Movements: Extraocular movements intact.      Conjunctiva/sclera: Conjunctivae normal.      Pupils: Pupils are equal, round, and reactive to light.   Cardiovascular:      Rate and Rhythm: Normal rate and regular rhythm.      Heart sounds: Normal heart sounds.   Pulmonary:      Effort: Pulmonary effort is normal.      Breath sounds: Normal breath sounds.   Musculoskeletal:         General: Normal range of motion.      Cervical back: Normal range of motion.   Skin:     General: Skin is warm and dry.   Neurological:      General: No focal deficit present.      Mental Status: She is  alert and oriented to person, place, and time.   Psychiatric:         Mood and Affect: Mood normal.         Behavior: Behavior normal.        Assessment and Plan (including Health Maintenance)      Problem List  Smart Sets  Document Outside HM   :    Plan:         Health Maintenance Due   Topic Date Due    Hepatitis C Screening  Never done    TETANUS VACCINE  Never done    Hemoglobin A1c (Diabetic Prevention Screening)  Never done    Shingles Vaccine (1 of 2) Never done       Problem List Items Addressed This Visit          Psychiatric    EAMON (generalized anxiety disorder) (Chronic)    Relevant Medications    ALPRAZolam (XANAX) 1 MG tablet    Recurrent major depressive disorder (Chronic)    Relevant Medications    FLUoxetine 40 MG capsule     Other Visit Diagnoses       Sinusitis, unspecified chronicity, unspecified location    -  Primary    Relevant Medications    fluticasone propionate (FLONASE) 50 mcg/actuation nasal spray    Gastroesophageal reflux disease without esophagitis        Relevant Medications    famotidine (PEPCID) 20 MG tablet    Bronchitis        Relevant Medications    albuterol (PROAIR HFA) 90 mcg/actuation inhaler            Health Maintenance Topics with due status: Not Due       Topic Last Completion Date    Colorectal Cancer Screening 06/25/2021    Lipid Panel 03/11/2022       No future appointments.         Signature:  MD PALOMO [facility]  MEDICAL GROUP [facility] FAMILY MEDICINE  [address] [place] [address] [place] [address]  [phone]    Date of encounter: 9/12/23

## 2023-12-13 ENCOUNTER — OFFICE VISIT (OUTPATIENT)
Dept: FAMILY MEDICINE | Facility: CLINIC | Age: 53
End: 2023-12-13
Payer: COMMERCIAL

## 2023-12-13 VITALS
HEART RATE: 81 BPM | WEIGHT: 156 LBS | HEIGHT: 63 IN | BODY MASS INDEX: 27.64 KG/M2 | DIASTOLIC BLOOD PRESSURE: 83 MMHG | SYSTOLIC BLOOD PRESSURE: 114 MMHG

## 2023-12-13 DIAGNOSIS — F41.1 GAD (GENERALIZED ANXIETY DISORDER): Chronic | ICD-10-CM

## 2023-12-13 DIAGNOSIS — J40 BRONCHITIS: Chronic | ICD-10-CM

## 2023-12-13 DIAGNOSIS — R06.83 SNORES: Chronic | ICD-10-CM

## 2023-12-13 DIAGNOSIS — U09.9 COVID-19 LONG HAULER: Chronic | ICD-10-CM

## 2023-12-13 DIAGNOSIS — R11.0 NAUSEA: ICD-10-CM

## 2023-12-13 DIAGNOSIS — R53.82 CHRONIC FATIGUE: Chronic | ICD-10-CM

## 2023-12-13 DIAGNOSIS — F33.9 RECURRENT MAJOR DEPRESSIVE DISORDER, REMISSION STATUS UNSPECIFIED: Primary | Chronic | ICD-10-CM

## 2023-12-13 PROBLEM — J32.9 SINUSITIS: Chronic | Status: RESOLVED | Noted: 2023-09-12 | Resolved: 2023-12-13

## 2023-12-13 PROBLEM — Z79.899 ON DEEP VEIN THROMBOSIS (DVT) PROPHYLAXIS: Status: RESOLVED | Noted: 2021-08-27 | Resolved: 2023-12-13

## 2023-12-13 PROBLEM — K63.5 POLYP OF DESCENDING COLON: Status: RESOLVED | Noted: 2021-06-25 | Resolved: 2023-12-13

## 2023-12-13 PROBLEM — U07.1 COVID-19 VIRUS INFECTION: Status: RESOLVED | Noted: 2021-08-27 | Resolved: 2023-12-13

## 2023-12-13 PROBLEM — K63.5 POLYP OF TRANSVERSE COLON: Status: RESOLVED | Noted: 2021-06-25 | Resolved: 2023-12-13

## 2023-12-13 PROCEDURE — 3079F DIAST BP 80-89 MM HG: CPT | Mod: ,,, | Performed by: FAMILY MEDICINE

## 2023-12-13 PROCEDURE — 99214 PR OFFICE/OUTPT VISIT, EST, LEVL IV, 30-39 MIN: ICD-10-PCS | Mod: ,,, | Performed by: FAMILY MEDICINE

## 2023-12-13 PROCEDURE — 3074F SYST BP LT 130 MM HG: CPT | Mod: ,,, | Performed by: FAMILY MEDICINE

## 2023-12-13 PROCEDURE — 3074F PR MOST RECENT SYSTOLIC BLOOD PRESSURE < 130 MM HG: ICD-10-PCS | Mod: ,,, | Performed by: FAMILY MEDICINE

## 2023-12-13 PROCEDURE — 3079F PR MOST RECENT DIASTOLIC BLOOD PRESSURE 80-89 MM HG: ICD-10-PCS | Mod: ,,, | Performed by: FAMILY MEDICINE

## 2023-12-13 PROCEDURE — 3008F BODY MASS INDEX DOCD: CPT | Mod: ,,, | Performed by: FAMILY MEDICINE

## 2023-12-13 PROCEDURE — 99214 OFFICE O/P EST MOD 30 MIN: CPT | Mod: ,,, | Performed by: FAMILY MEDICINE

## 2023-12-13 PROCEDURE — 3008F PR BODY MASS INDEX (BMI) DOCUMENTED: ICD-10-PCS | Mod: ,,, | Performed by: FAMILY MEDICINE

## 2023-12-13 RX ORDER — FLUOXETINE HYDROCHLORIDE 40 MG/1
40 CAPSULE ORAL DAILY
Qty: 90 CAPSULE | Refills: 2 | Status: SHIPPED | OUTPATIENT
Start: 2023-12-13

## 2023-12-13 RX ORDER — ONDANSETRON 4 MG/1
4 TABLET, ORALLY DISINTEGRATING ORAL EVERY 8 HOURS PRN
Qty: 20 TABLET | Refills: 0 | Status: SHIPPED | OUTPATIENT
Start: 2023-12-13

## 2023-12-13 RX ORDER — ALBUTEROL SULFATE 90 UG/1
2 AEROSOL, METERED RESPIRATORY (INHALATION) EVERY 6 HOURS PRN
Qty: 18 G | Refills: 2 | Status: SHIPPED | OUTPATIENT
Start: 2023-12-13 | End: 2024-12-12

## 2023-12-13 RX ORDER — ALPRAZOLAM 1 MG/1
TABLET ORAL
Qty: 90 TABLET | Refills: 2 | Status: SHIPPED | OUTPATIENT
Start: 2023-12-13 | End: 2024-03-19 | Stop reason: SDUPTHER

## 2023-12-13 NOTE — PROGRESS NOTES
"   Heath Doshi MD   [facility]  MEDICAL GROUP [address] [place] [address]  [phone]      PATIENT NAME: Merissa Ochoa  : 1970  DATE: 23  MRN: [id number]      Billing Provider: Heath Doshi MD  Level of Service:   Patient PCP Information       Provider PCP Type    Heath Doshi MD General            Reason for Visit / Chief Complaint: Medication Refill, Fatigue, and Memory Loss (Brain Fog)       Update PCP  Update Chief Complaint         History of Present Illness / Problem Focused Workflow     Merissa Ochoa presents to the clinic with Medication Refill, Fatigue, and Memory Loss (Brain Fog)       54 yo WF here for follow up MDD, EAMON and SOB.  Has had chronic fatigue and SOB/wheezing ever since having been hospitalized in 2021 for COVID-19 pneumonia.  Is also complaining of memory problems.  She does report that she snores.  Is unaware of any witnessed apneic spells.  Has never had a sleep study done.  She says that she stays tired "all of the time" no matter how much sleep she gets.  This was not a problem prior to having COVID.          Medication Refill  Associated symptoms include coughing and fatigue.   Fatigue  Associated symptoms include coughing and fatigue.       Review of Systems     Review of Systems   Constitutional:  Positive for fatigue.   Respiratory:  Positive for cough, shortness of breath and wheezing.    Psychiatric/Behavioral:  Positive for decreased concentration.         Medical / Social / Family History     Past Medical History:   Diagnosis Date    Anxiety attack     Diverticula, colon 2021    Family history of colonic polyps 2021    Polyp of descending colon 2021    Polyp of transverse colon 2021    Positive colorectal cancer screening using Cologuard test 2021       Past Surgical History:   Procedure Laterality Date    CHOLECYSTECTOMY      HYSTERECTOMY      TUBAL " LIGATION         Social History    reports that she has been smoking cigarettes. She has never been exposed to tobacco smoke. She has never used smokeless tobacco. She reports that she does not currently use alcohol. She reports that she does not currently use drugs.   Social History     Tobacco Use    Smoking status: Every Day     Types: Cigarettes     Passive exposure: Never    Smokeless tobacco: Never   Substance Use Topics    Alcohol use: Not Currently    Drug use: Not Currently       Family History  Family History   Problem Relation Age of Onset    Diabetes Mellitus Other     Heart disease Other     Hypertension Other     Mental illness Other     Ovarian cancer Other        Medications and Allergies     Medications  Outpatient Medications Marked as Taking for the 12/13/23 encounter (Office Visit) with Heath Doshi MD   Medication Sig Dispense Refill    famotidine (PEPCID) 20 MG tablet Take 1 tablet (20 mg total) by mouth 2 (two) times daily. 60 tablet 2    fluticasone propionate (FLONASE) 50 mcg/actuation nasal spray 2 sprays (100 mcg total) by Each Nostril route once daily. 16 g 2    [DISCONTINUED] albuterol (PROAIR HFA) 90 mcg/actuation inhaler Inhale 2 puffs into the lungs every 6 (six) hours as needed for Wheezing. Rescue 18 g 0    [DISCONTINUED] ALPRAZolam (XANAX) 1 MG tablet TAKE 1 TABLET BY MOUTH 3 TIMES DAILY *CAUSES DROWSINESS-AVOID ALCOHOL!! 90 tablet 2    [DISCONTINUED] FLUoxetine 40 MG capsule Take 1 capsule (40 mg total) by mouth once daily. 90 capsule 0       Allergies  Review of patient's allergies indicates:   Allergen Reactions    Betadine [povidone-iodine]     Propranolol     Iodinated contrast media Nausea And Vomiting    Lexapro [escitalopram] Nausea And Vomiting       Physical Examination     Vitals:    12/13/23 1448   BP: 114/83   Pulse: 81     Physical Exam  Vitals reviewed.   Constitutional:       General: She is not in acute distress.     Appearance: Normal appearance.   HENT:       Head: Normocephalic and atraumatic.   Eyes:      Extraocular Movements: Extraocular movements intact.      Conjunctiva/sclera: Conjunctivae normal.      Pupils: Pupils are equal, round, and reactive to light.   Cardiovascular:      Rate and Rhythm: Normal rate and regular rhythm.      Heart sounds: Normal heart sounds.   Pulmonary:      Effort: Pulmonary effort is normal. No respiratory distress.      Breath sounds: Wheezing (left lung fields) present. No rhonchi or rales.   Musculoskeletal:         General: Normal range of motion.      Cervical back: Normal range of motion.   Skin:     General: Skin is warm and dry.   Neurological:      General: No focal deficit present.      Mental Status: She is alert and oriented to person, place, and time.   Psychiatric:         Mood and Affect: Mood normal.         Behavior: Behavior normal.          Assessment and Plan (including Health Maintenance)      Problem List  Smart [employer]  Document Outside HM   :    Plan: needs sleep study to evaluate for SALVATORE        Health Maintenance Due   Topic Date Due    Hepatitis C Screening  Never done    COVID-19 Vaccine (1) Never done    TETANUS VACCINE  Never done    Mammogram  Never done    Hemoglobin A1c (Diabetic Prevention Screening)  Never done    Shingles Vaccine (1 of 2) Never done       Problem List Items Addressed This Visit          Psychiatric    EAMON (generalized anxiety disorder) (Chronic)    Relevant Medications    ALPRAZolam (XANAX) 1 MG tablet    Recurrent major depressive disorder - Primary (Chronic)    Relevant Medications    FLUoxetine 40 MG capsule       Pulmonary    Bronchitis (Chronic)    Relevant Medications    albuterol (PROAIR HFA) 90 mcg/actuation inhaler       ID    COVID-19 long hauler    Relevant Medications    albuterol (PROAIR HFA) 90 mcg/actuation inhaler       Other    Chronic fatigue (Chronic)    Relevant Orders    Ambulatory referral/consult to Sleep Disorders     Other Visit Diagnoses       Snores  (Chronic)        Relevant Orders    Ambulatory referral/consult to Sleep Disorders    Nausea        Relevant Medications    ondansetron (ZOFRAN-ODT) 4 MG TbDL            Health Maintenance Topics with due status: Not Due       Topic Last Completion Date    Colorectal Cancer Screening 06/25/2021    Lipid Panel 03/11/2022       No future appointments.         Signature:  MD PALOMO Abel [facility]  MEDICAL GROUP [address] [address]  [phone]    Date of encounter: 12/13/23

## 2024-03-19 DIAGNOSIS — F41.1 GAD (GENERALIZED ANXIETY DISORDER): Chronic | ICD-10-CM

## 2024-03-19 RX ORDER — ALPRAZOLAM 1 MG/1
TABLET ORAL
Qty: 90 TABLET | Refills: 0 | Status: SHIPPED | OUTPATIENT
Start: 2024-03-19 | End: 2024-03-19

## 2024-03-19 RX ORDER — ALPRAZOLAM 1 MG/1
1 TABLET ORAL 3 TIMES DAILY PRN
Qty: 90 TABLET | Refills: 0 | Status: SHIPPED | OUTPATIENT
Start: 2024-03-19 | End: 2024-04-19 | Stop reason: SDUPTHER

## 2024-04-19 ENCOUNTER — OFFICE VISIT (OUTPATIENT)
Dept: FAMILY MEDICINE | Facility: CLINIC | Age: 54
End: 2024-04-19
Payer: COMMERCIAL

## 2024-04-19 VITALS
WEIGHT: 154.81 LBS | DIASTOLIC BLOOD PRESSURE: 90 MMHG | SYSTOLIC BLOOD PRESSURE: 135 MMHG | HEART RATE: 76 BPM | BODY MASS INDEX: 27.43 KG/M2 | HEIGHT: 63 IN

## 2024-04-19 DIAGNOSIS — F41.1 GAD (GENERALIZED ANXIETY DISORDER): Chronic | ICD-10-CM

## 2024-04-19 DIAGNOSIS — R11.0 NAUSEA: Chronic | ICD-10-CM

## 2024-04-19 DIAGNOSIS — F33.9 RECURRENT MAJOR DEPRESSIVE DISORDER, REMISSION STATUS UNSPECIFIED: Primary | Chronic | ICD-10-CM

## 2024-04-19 DIAGNOSIS — R53.82 CHRONIC FATIGUE: Chronic | ICD-10-CM

## 2024-04-19 DIAGNOSIS — R79.9 ABNORMAL BLOOD CHEMISTRY: ICD-10-CM

## 2024-04-19 DIAGNOSIS — Z12.31 SCREENING MAMMOGRAM FOR BREAST CANCER: ICD-10-CM

## 2024-04-19 DIAGNOSIS — J40 BRONCHITIS: Chronic | ICD-10-CM

## 2024-04-19 DIAGNOSIS — Z79.899 LONG-TERM USE OF HIGH-RISK MEDICATION: ICD-10-CM

## 2024-04-19 DIAGNOSIS — U09.9 COVID-19 LONG HAULER: Chronic | ICD-10-CM

## 2024-04-19 PROCEDURE — 99214 OFFICE O/P EST MOD 30 MIN: CPT | Mod: ,,, | Performed by: FAMILY MEDICINE

## 2024-04-19 PROCEDURE — 3075F SYST BP GE 130 - 139MM HG: CPT | Mod: ,,, | Performed by: FAMILY MEDICINE

## 2024-04-19 PROCEDURE — 83036 HEMOGLOBIN GLYCOSYLATED A1C: CPT | Mod: ,,, | Performed by: CLINICAL MEDICAL LABORATORY

## 2024-04-19 PROCEDURE — 80050 GENERAL HEALTH PANEL: CPT | Mod: ,,, | Performed by: CLINICAL MEDICAL LABORATORY

## 2024-04-19 PROCEDURE — 1160F RVW MEDS BY RX/DR IN RCRD: CPT | Mod: ,,, | Performed by: FAMILY MEDICINE

## 2024-04-19 PROCEDURE — 3080F DIAST BP >= 90 MM HG: CPT | Mod: ,,, | Performed by: FAMILY MEDICINE

## 2024-04-19 PROCEDURE — 3008F BODY MASS INDEX DOCD: CPT | Mod: ,,, | Performed by: FAMILY MEDICINE

## 2024-04-19 PROCEDURE — 1159F MED LIST DOCD IN RCRD: CPT | Mod: ,,, | Performed by: FAMILY MEDICINE

## 2024-04-19 RX ORDER — ALPRAZOLAM 1 MG/1
1 TABLET ORAL 3 TIMES DAILY PRN
Qty: 90 TABLET | Refills: 1 | Status: SHIPPED | OUTPATIENT
Start: 2024-04-19 | End: 2024-06-19 | Stop reason: SDUPTHER

## 2024-04-19 RX ORDER — ONDANSETRON 4 MG/1
4 TABLET, ORALLY DISINTEGRATING ORAL EVERY 8 HOURS PRN
Qty: 30 TABLET | Refills: 1 | Status: SHIPPED | OUTPATIENT
Start: 2024-04-19

## 2024-04-19 RX ORDER — ALBUTEROL SULFATE 90 UG/1
2 AEROSOL, METERED RESPIRATORY (INHALATION) EVERY 6 HOURS PRN
Qty: 18 G | Refills: 2 | Status: SHIPPED | OUTPATIENT
Start: 2024-04-19 | End: 2025-04-19

## 2024-04-19 RX ORDER — FLUOXETINE HYDROCHLORIDE 40 MG/1
40 CAPSULE ORAL DAILY
Qty: 90 CAPSULE | Refills: 2 | Status: SHIPPED | OUTPATIENT
Start: 2024-04-19 | End: 2024-06-19 | Stop reason: SDUPTHER

## 2024-04-19 NOTE — PROGRESS NOTES
Heath Doshi MD   [facility]  MEDICAL GROUP [facility]  [address] [place] [id number]  [phone]      PATIENT NAME: Merissa Ochoa  : 1970  DATE: 24  MRN: [id number]      Billing Provider: Heath Doshi MD  Level of Service:   Patient PCP Information       Provider PCP Type    Heath Doshi MD General            Reason for Visit / Chief Complaint: Medication Refill       Update PCP  Update Chief Complaint         History of Present Illness / Problem Focused Workflow     Merissa Ochoa presents to the clinic with Medication Refill       Follow up EAMON, MDD, long Covid and bronchitis.  She is requesting medication refills today.  Chronic conditions are stable.  Continues to have fatigue and SOB.      Medication Refill  Associated symptoms include fatigue (chronic post COVID). Pertinent negatives include no abdominal pain, chest pain, chills, coughing, fever, sore throat or weakness.       Review of Systems     Review of Systems   Constitutional:  Positive for fatigue (chronic post COVID). Negative for activity change, chills and fever.   HENT:  Negative for sore throat.    Eyes:  Negative for pain.   Respiratory:  Positive for shortness of breath (chronic post COVID) and wheezing (chronic post COVID). Negative for cough and chest tightness.    Cardiovascular:  Negative for chest pain and palpitations.   Gastrointestinal:  Negative for abdominal pain.   Musculoskeletal:  Positive for back pain.   Neurological:  Negative for dizziness, syncope and weakness.   Psychiatric/Behavioral:  Negative for confusion. The patient is nervous/anxious.         Medical / Social / Family History     Past Medical History:   Diagnosis Date    Anxiety attack     COVID-19 virus infection 2021    Diverticula, colon 2021    Family history of colonic polyps 2021    Polyp of descending colon 2021    Polyp of descending colon 2021     Polyp of transverse colon 06/25/2021    Polyp of transverse colon 06/25/2021    Positive colorectal cancer screening using Cologuard test 06/25/2021    Sinusitis 09/12/2023       Past Surgical History:   Procedure Laterality Date    CHOLECYSTECTOMY      HYSTERECTOMY      TUBAL LIGATION         Social History    reports that she has been smoking cigarettes. She has never been exposed to tobacco smoke. She has never used smokeless tobacco. She reports that she does not currently use alcohol. She reports that she does not currently use drugs.   Social History     Tobacco Use    Smoking status: Every Day     Types: Cigarettes     Passive exposure: Never    Smokeless tobacco: Never   Substance Use Topics    Alcohol use: Not Currently    Drug use: Not Currently       Family History  Family History   Problem Relation Name Age of Onset    Diabetes Mellitus Other Positive Family Hx     Heart disease Other Positive Family Hx     Hypertension Other Positive Family Hx     Mental illness Other Positive Family Hx     Ovarian cancer Other Positive Family Hx        Medications and Allergies     Medications  Current Outpatient Medications   Medication Sig Dispense Refill    albuterol (PROAIR HFA) 90 mcg/actuation inhaler Inhale 2 puffs into the lungs every 6 (six) hours as needed for Wheezing. Rescue 18 g 2    ALPRAZolam (XANAX) 1 MG tablet Take 1 tablet (1 mg total) by mouth 3 (three) times daily as needed for Anxiety. Clinic visit needed for additional refills 90 tablet 1    famotidine (PEPCID) 20 MG tablet Take 1 tablet (20 mg total) by mouth 2 (two) times daily. 60 tablet 2    FLUoxetine 40 MG capsule Take 1 capsule (40 mg total) by mouth once daily. 90 capsule 2    fluticasone propionate (FLONASE) 50 mcg/actuation nasal spray 2 sprays (100 mcg total) by Each Nostril route once daily. 16 g 2    ondansetron (ZOFRAN-ODT) 4 MG TbDL Take 1 tablet (4 mg total) by mouth every 8 (eight) hours as needed (nausea). 30 tablet 1     No  current facility-administered medications for this visit.       Allergies  Review of patient's allergies indicates:   Allergen Reactions    Betadine [povidone-iodine]     Propranolol     Iodinated contrast media Nausea And Vomiting    Lexapro [escitalopram] Nausea And Vomiting       Physical Examination     Vitals:    04/19/24 1309   BP: (!) 135/90   Pulse: 76     Physical Exam  Vitals and nursing note reviewed.   Constitutional:       General: She is not in acute distress.     Appearance: Normal appearance.   HENT:      Head: Normocephalic.   Eyes:      Conjunctiva/sclera: Conjunctivae normal.   Neck:      Thyroid: No thyromegaly.      Trachea: Trachea normal.   Cardiovascular:      Rate and Rhythm: Normal rate and regular rhythm.      Pulses: Normal pulses.      Heart sounds: Normal heart sounds.   Pulmonary:      Effort: Pulmonary effort is normal. No respiratory distress.      Breath sounds: Wheezing (faint wheeze) present.   Musculoskeletal:      Cervical back: Neck supple.   Skin:     General: Skin is warm and dry.   Neurological:      General: No focal deficit present.      Mental Status: She is alert and oriented to person, place, and time.   Psychiatric:         Mood and Affect: Mood normal.         Behavior: Behavior normal.          Assessment and Plan (including Health Maintenance)      Problem List  Smart Sets  Document Outside HM   :    Plan:         Health Maintenance Due   Topic Date Due    Hepatitis C Screening  Never done    TETANUS VACCINE  Never done    Mammogram  Never done    Hemoglobin A1c (Diabetic Prevention Screening)  Never done    Shingles Vaccine (1 of 2) Never done    COVID-19 Vaccine (1 - 2023-24 season) Never done       Problem List Items Addressed This Visit          Psychiatric    EAMON (generalized anxiety disorder) (Chronic)    Relevant Medications    ALPRAZolam (XANAX) 1 MG tablet    Other Relevant Orders    TSH    Recurrent major depressive disorder - Primary (Chronic)    Relevant  Medications    FLUoxetine 40 MG capsule    Other Relevant Orders    TSH       Pulmonary    Bronchitis (Chronic)    Relevant Medications    albuterol (PROAIR HFA) 90 mcg/actuation inhaler       ID    COVID-19 long hauler    Relevant Medications    albuterol (PROAIR HFA) 90 mcg/actuation inhaler       Other    Chronic fatigue (Chronic)    Relevant Orders    TSH     Other Visit Diagnoses       Nausea  (Chronic)       Relevant Medications    ondansetron (ZOFRAN-ODT) 4 MG TbDL    Abnormal blood chemistry        Relevant Orders    Hemoglobin A1C    Long-term use of high-risk medication  (Chronic)       Relevant Orders    CBC Auto Differential    Comprehensive Metabolic Panel    Screening mammogram for breast cancer        Relevant Orders    Mammo Digital Screening Bilat w/ Juan            Health Maintenance Topics with due status: Not Due       Topic Last Completion Date    Colorectal Cancer Screening 06/25/2021    Lipid Panel 03/11/2022       Future Appointments   Date Time Provider Department Center   6/25/2024  9:00 AM Heath Doshi MD [facility]            Signature:  [facility]  MEDICAL GROUP [facility]  [address] [place] [id number]  [phone]    Date of encounter: 4/19/24

## 2024-04-20 LAB
ALBUMIN SERPL BCP-MCNC: 3.7 G/DL (ref 3.5–5)
ALBUMIN/GLOB SERPL: 1.1 {RATIO}
ALP SERPL-CCNC: 101 U/L (ref 41–108)
ALT SERPL W P-5'-P-CCNC: 13 U/L (ref 13–56)
ANION GAP SERPL CALCULATED.3IONS-SCNC: 8 MMOL/L (ref 7–16)
AST SERPL W P-5'-P-CCNC: 15 U/L (ref 15–37)
BASOPHILS # BLD AUTO: 0.04 K/UL (ref 0–0.2)
BASOPHILS NFR BLD AUTO: 0.6 % (ref 0–1)
BILIRUB SERPL-MCNC: 0.3 MG/DL (ref ?–1.2)
BUN SERPL-MCNC: 10 MG/DL (ref 7–18)
BUN/CREAT SERPL: 11 (ref 6–20)
CALCIUM SERPL-MCNC: 9.3 MG/DL (ref 8.5–10.1)
CHLORIDE SERPL-SCNC: 110 MMOL/L (ref 98–107)
CO2 SERPL-SCNC: 25 MMOL/L (ref 21–32)
CREAT SERPL-MCNC: 0.9 MG/DL (ref 0.55–1.02)
DIFFERENTIAL METHOD BLD: ABNORMAL
EGFR (NO RACE VARIABLE) (RUSH/TITUS): 77 ML/MIN/1.73M2
EOSINOPHIL # BLD AUTO: 0.48 K/UL (ref 0–0.5)
EOSINOPHIL NFR BLD AUTO: 7.3 % (ref 1–4)
ERYTHROCYTE [DISTWIDTH] IN BLOOD BY AUTOMATED COUNT: 14.3 % (ref 11.5–14.5)
EST. AVERAGE GLUCOSE BLD GHB EST-MCNC: 105 MG/DL
GLOBULIN SER-MCNC: 3.5 G/DL (ref 2–4)
GLUCOSE SERPL-MCNC: 83 MG/DL (ref 74–106)
HBA1C MFR BLD HPLC: 5.3 % (ref 4.5–6.6)
HCT VFR BLD AUTO: 45.7 % (ref 38–47)
HGB BLD-MCNC: 14.5 G/DL (ref 12–16)
IMM GRANULOCYTES # BLD AUTO: 0.02 K/UL (ref 0–0.04)
IMM GRANULOCYTES NFR BLD: 0.3 % (ref 0–0.4)
LYMPHOCYTES # BLD AUTO: 1.59 K/UL (ref 1–4.8)
LYMPHOCYTES NFR BLD AUTO: 24.2 % (ref 27–41)
MCH RBC QN AUTO: 29.2 PG (ref 27–31)
MCHC RBC AUTO-ENTMCNC: 31.7 G/DL (ref 32–36)
MCV RBC AUTO: 92 FL (ref 80–96)
MONOCYTES # BLD AUTO: 0.54 K/UL (ref 0–0.8)
MONOCYTES NFR BLD AUTO: 8.2 % (ref 2–6)
MPC BLD CALC-MCNC: 10.9 FL (ref 9.4–12.4)
NEUTROPHILS # BLD AUTO: 3.9 K/UL (ref 1.8–7.7)
NEUTROPHILS NFR BLD AUTO: 59.4 % (ref 53–65)
NRBC # BLD AUTO: 0 X10E3/UL
NRBC, AUTO (.00): 0 %
PLATELET # BLD AUTO: 276 K/UL (ref 150–400)
POTASSIUM SERPL-SCNC: 3.9 MMOL/L (ref 3.5–5.1)
PROT SERPL-MCNC: 7.2 G/DL (ref 6.4–8.2)
RBC # BLD AUTO: 4.97 M/UL (ref 4.2–5.4)
SODIUM SERPL-SCNC: 139 MMOL/L (ref 136–145)
TSH SERPL DL<=0.005 MIU/L-ACNC: 0.84 UIU/ML (ref 0.36–3.74)
WBC # BLD AUTO: 6.57 K/UL (ref 4.5–11)

## 2024-04-30 ENCOUNTER — HOSPITAL ENCOUNTER (EMERGENCY)
Facility: HOSPITAL | Age: 54
Discharge: HOME | End: 2024-04-30
Payer: COMMERCIAL

## 2024-04-30 VITALS
OXYGEN SATURATION: 96 % | HEIGHT: 63 IN | TEMPERATURE: 98 F | WEIGHT: 150 LBS | SYSTOLIC BLOOD PRESSURE: 112 MMHG | BODY MASS INDEX: 26.58 KG/M2 | DIASTOLIC BLOOD PRESSURE: 78 MMHG | RESPIRATION RATE: 16 BRPM | HEART RATE: 89 BPM

## 2024-04-30 DIAGNOSIS — R06.02 SHORTNESS OF BREATH: ICD-10-CM

## 2024-04-30 DIAGNOSIS — K59.00 CONSTIPATION, UNSPECIFIED CONSTIPATION TYPE: Primary | ICD-10-CM

## 2024-04-30 LAB
ALBUMIN SERPL BCP-MCNC: 3.7 G/DL (ref 3.5–5)
ALBUMIN/GLOB SERPL: 1 {RATIO}
ALP SERPL-CCNC: 114 U/L (ref 41–108)
ALT SERPL W P-5'-P-CCNC: 23 U/L (ref 13–56)
ANION GAP SERPL CALCULATED.3IONS-SCNC: 8 MMOL/L (ref 7–16)
AST SERPL W P-5'-P-CCNC: 29 U/L (ref 15–37)
BASOPHILS # BLD AUTO: 0.04 K/UL (ref 0–0.2)
BASOPHILS NFR BLD AUTO: 0.4 % (ref 0–1)
BILIRUB SERPL-MCNC: 0.3 MG/DL (ref ?–1.2)
BILIRUB UR QL STRIP: NEGATIVE
BUN SERPL-MCNC: 10 MG/DL (ref 7–18)
BUN/CREAT SERPL: 9 (ref 6–20)
CALCIUM SERPL-MCNC: 9.4 MG/DL (ref 8.5–10.1)
CHLORIDE SERPL-SCNC: 110 MMOL/L (ref 98–107)
CLARITY UR: CLEAR
CO2 SERPL-SCNC: 26 MMOL/L (ref 21–32)
COLOR UR: ABNORMAL
CREAT SERPL-MCNC: 1.08 MG/DL (ref 0.55–1.02)
DIFFERENTIAL METHOD BLD: ABNORMAL
EGFR (NO RACE VARIABLE) (RUSH/TITUS): 62 ML/MIN/1.73M2
EOSINOPHIL # BLD AUTO: 0.46 K/UL (ref 0–0.5)
EOSINOPHIL NFR BLD AUTO: 4.5 % (ref 1–4)
ERYTHROCYTE [DISTWIDTH] IN BLOOD BY AUTOMATED COUNT: 13.8 % (ref 11.5–14.5)
GLOBULIN SER-MCNC: 3.8 G/DL (ref 2–4)
GLUCOSE SERPL-MCNC: 101 MG/DL (ref 74–106)
GLUCOSE UR STRIP-MCNC: NORMAL MG/DL
HCT VFR BLD AUTO: 44 % (ref 38–47)
HGB BLD-MCNC: 14.4 G/DL (ref 12–16)
IMM GRANULOCYTES # BLD AUTO: 0.03 K/UL (ref 0–0.04)
IMM GRANULOCYTES NFR BLD: 0.3 % (ref 0–0.4)
INFLUENZA A MOLECULAR (OHS): NEGATIVE
INFLUENZA B MOLECULAR (OHS): NEGATIVE
INR BLD: 0.99
KETONES UR STRIP-SCNC: ABNORMAL MG/DL
LEUKOCYTE ESTERASE UR QL STRIP: NEGATIVE
LYMPHOCYTES # BLD AUTO: 1.7 K/UL (ref 1–4.8)
LYMPHOCYTES NFR BLD AUTO: 16.8 % (ref 27–41)
MAGNESIUM SERPL-MCNC: 2.2 MG/DL (ref 1.7–2.3)
MCH RBC QN AUTO: 29.1 PG (ref 27–31)
MCHC RBC AUTO-ENTMCNC: 32.7 G/DL (ref 32–36)
MCV RBC AUTO: 89.1 FL (ref 80–96)
MONOCYTES # BLD AUTO: 0.57 K/UL (ref 0–0.8)
MONOCYTES NFR BLD AUTO: 5.6 % (ref 2–6)
MPC BLD CALC-MCNC: 10.6 FL (ref 9.4–12.4)
MUCOUS, UA: ABNORMAL /LPF
NEUTROPHILS # BLD AUTO: 7.34 K/UL (ref 1.8–7.7)
NEUTROPHILS NFR BLD AUTO: 72.4 % (ref 53–65)
NITRITE UR QL STRIP: NEGATIVE
NRBC # BLD AUTO: 0 X10E3/UL
NRBC, AUTO (.00): 0 %
PH UR STRIP: 5.5 PH UNITS
PLATELET # BLD AUTO: 286 K/UL (ref 150–400)
POTASSIUM SERPL-SCNC: 3.8 MMOL/L (ref 3.5–5.1)
PROT SERPL-MCNC: 7.5 G/DL (ref 6.4–8.2)
PROT UR QL STRIP: 10
PROTHROMBIN TIME: 13 SECONDS (ref 11.7–14.7)
RBC # BLD AUTO: 4.94 M/UL (ref 4.2–5.4)
RBC # UR STRIP: ABNORMAL /UL
RBC #/AREA URNS HPF: 3 /HPF
SARS-COV-2 RDRP RESP QL NAA+PROBE: NEGATIVE
SODIUM SERPL-SCNC: 140 MMOL/L (ref 136–145)
SP GR UR STRIP: 1.02
SQUAMOUS #/AREA URNS LPF: ABNORMAL /HPF
TROPONIN I SERPL DL<=0.01 NG/ML-MCNC: 27.9 PG/ML
UROBILINOGEN UR STRIP-ACNC: 3 MG/DL
WBC # BLD AUTO: 10.14 K/UL (ref 4.5–11)
WBC #/AREA URNS HPF: 1 /HPF

## 2024-04-30 PROCEDURE — 87635 SARS-COV-2 COVID-19 AMP PRB: CPT | Performed by: NURSE PRACTITIONER

## 2024-04-30 PROCEDURE — 99285 EMERGENCY DEPT VISIT HI MDM: CPT | Mod: 25

## 2024-04-30 PROCEDURE — 93005 ELECTROCARDIOGRAM TRACING: CPT

## 2024-04-30 PROCEDURE — 87502 INFLUENZA DNA AMP PROBE: CPT | Performed by: NURSE PRACTITIONER

## 2024-04-30 PROCEDURE — 85610 PROTHROMBIN TIME: CPT | Performed by: NURSE PRACTITIONER

## 2024-04-30 PROCEDURE — 83735 ASSAY OF MAGNESIUM: CPT | Performed by: NURSE PRACTITIONER

## 2024-04-30 PROCEDURE — 36415 COLL VENOUS BLD VENIPUNCTURE: CPT | Performed by: NURSE PRACTITIONER

## 2024-04-30 PROCEDURE — 25000003 PHARM REV CODE 250: Performed by: NURSE PRACTITIONER

## 2024-04-30 PROCEDURE — 85025 COMPLETE CBC W/AUTO DIFF WBC: CPT | Performed by: NURSE PRACTITIONER

## 2024-04-30 PROCEDURE — 84484 ASSAY OF TROPONIN QUANT: CPT | Performed by: NURSE PRACTITIONER

## 2024-04-30 PROCEDURE — 99284 EMERGENCY DEPT VISIT MOD MDM: CPT | Mod: ,,, | Performed by: NURSE PRACTITIONER

## 2024-04-30 PROCEDURE — 81003 URINALYSIS AUTO W/O SCOPE: CPT | Performed by: NURSE PRACTITIONER

## 2024-04-30 PROCEDURE — 80053 COMPREHEN METABOLIC PANEL: CPT | Performed by: NURSE PRACTITIONER

## 2024-04-30 PROCEDURE — 93010 ELECTROCARDIOGRAM REPORT: CPT | Mod: ,,, | Performed by: HOSPITALIST

## 2024-04-30 RX ORDER — PROMETHAZINE HYDROCHLORIDE 25 MG/1
25 TABLET ORAL EVERY 6 HOURS PRN
Qty: 15 TABLET | Refills: 0 | Status: SHIPPED | OUTPATIENT
Start: 2024-04-30

## 2024-04-30 RX ORDER — ONDANSETRON 4 MG/1
4 TABLET, ORALLY DISINTEGRATING ORAL
Status: COMPLETED | OUTPATIENT
Start: 2024-04-30 | End: 2024-04-30

## 2024-04-30 RX ORDER — POLYETHYLENE GLYCOL 3350 17 G/17G
17 POWDER, FOR SOLUTION ORAL DAILY
Qty: 30 EACH | Refills: 0 | Status: SHIPPED | OUTPATIENT
Start: 2024-04-30 | End: 2024-05-30

## 2024-04-30 RX ORDER — ONDANSETRON 4 MG/1
4 TABLET, ORALLY DISINTEGRATING ORAL EVERY 8 HOURS PRN
Qty: 15 TABLET | Refills: 0 | Status: SHIPPED | OUTPATIENT
Start: 2024-04-30 | End: 2024-06-19 | Stop reason: SDUPTHER

## 2024-04-30 RX ORDER — BISACODYL 5 MG
10 TABLET, DELAYED RELEASE (ENTERIC COATED) ORAL
Status: COMPLETED | OUTPATIENT
Start: 2024-04-30 | End: 2024-04-30

## 2024-04-30 RX ADMIN — ONDANSETRON 4 MG: 4 TABLET, ORALLY DISINTEGRATING ORAL at 09:04

## 2024-04-30 RX ADMIN — BISACODYL 10 MG: 5 TABLET, COATED ORAL at 10:04

## 2024-05-01 LAB
OHS QRS DURATION: 90 MS
OHS QTC CALCULATION: 459 MS

## 2024-05-01 NOTE — ED PROVIDER NOTES
Encounter Date: 4/30/2024       History     Chief Complaint   Patient presents with    General Illness     Pt presents to ed with c/o having chest congestion, cough, runny nose, fatigue and then started vomiting PTA     52 y/o WF presents to the emergency department with c/o congestion, cough, fatigue, abd cramping and vomiting. She states she initially started feeling badly on yesterday and progressed through today. She states she has vomited about three times this evening. She has had no fever or chills. She denies dysuria or hematuria.She states she had some mild shortness of breath but she does have hx of asthma. She states she got hot and took a shower and then vomited. She has had nothing for her symptoms. There are no particular exacerbating or remitting factors. She states she does smoke cigarettes daily. She denies alcohol or illicit drug use.    The history is provided by the patient.     Review of patient's allergies indicates:   Allergen Reactions    Betadine [povidone-iodine]     Propranolol     Iodinated contrast media Nausea And Vomiting    Lexapro [escitalopram] Nausea And Vomiting     Past Medical History:   Diagnosis Date    Anxiety attack     COVID-19 virus infection 08/27/2021    Diverticula, colon 06/25/2021    Family history of colonic polyps 06/25/2021    Polyp of descending colon 06/25/2021    Polyp of descending colon 06/25/2021    Polyp of transverse colon 06/25/2021    Polyp of transverse colon 06/25/2021    Positive colorectal cancer screening using Cologuard test 06/25/2021    Sinusitis 09/12/2023     Past Surgical History:   Procedure Laterality Date    CHOLECYSTECTOMY      HYSTERECTOMY      TUBAL LIGATION       Family History   Problem Relation Name Age of Onset    Diabetes Mellitus Other Positive Family Hx     Heart disease Other Positive Family Hx     Hypertension Other Positive Family Hx     Mental illness Other Positive Family Hx     Ovarian cancer Other Positive Family Hx       Social History     Tobacco Use    Smoking status: Every Day     Types: Cigarettes     Passive exposure: Never    Smokeless tobacco: Never   Substance Use Topics    Alcohol use: Not Currently    Drug use: Not Currently     Review of Systems   All other systems reviewed and are negative.      Physical Exam     Initial Vitals [04/30/24 2004]   BP Pulse Resp Temp SpO2   107/84 102 18 98.6 °F (37 °C) 95 %      MAP       --         Physical Exam    Constitutional: She appears well-developed and well-nourished. She is cooperative.  Non-toxic appearance.   HENT:   Nose: Nose normal.   Mouth/Throat: Oropharynx is clear and moist and mucous membranes are normal.   Cardiovascular:  Normal rate, regular rhythm and normal heart sounds.           Pulmonary/Chest: Effort normal and breath sounds normal.   Abdominal: Abdomen is soft. Bowel sounds are decreased. There is no abdominal tenderness.     Neurological: She is alert and oriented to person, place, and time.   Skin: Skin is warm, dry and intact. Capillary refill takes less than 2 seconds.         Medical Screening Exam   See Full Note    ED Course   Procedures  Labs Reviewed   URINALYSIS, REFLEX TO URINE CULTURE - Abnormal; Notable for the following components:       Result Value    Protein, UA 10 (*)     Urobilinogen, UA 3 (*)     Blood, UA Small (*)     All other components within normal limits   CBC WITH DIFFERENTIAL - Abnormal; Notable for the following components:    Neutrophils % 72.4 (*)     Lymphocytes % 16.8 (*)     Eosinophils % 4.5 (*)     All other components within normal limits   COMPREHENSIVE METABOLIC PANEL - Abnormal; Notable for the following components:    Chloride 110 (*)     Creatinine 1.08 (*)     Alk Phos 114 (*)     All other components within normal limits   URINALYSIS, MICROSCOPIC - Abnormal; Notable for the following components:    Squamous Epithelial Cells, UA Occasional (*)     Mucous Occasional (*)     All other components within normal  limits   INFLUENZA A & B BY MOLECULAR - Normal   TROPONIN I - Normal   MAGNESIUM - Normal   PROTIME-INR - Normal   SARS-COV-2 RNA AMPLIFICATION, QUAL - Normal    Narrative:     Negative SARS-CoV results should not be used as the sole basis for treatment or patient management decisions; negative results should be considered in the context of a patient's recent exposures, history and the presene of clinical signs and symptoms consistent with COVID-19.  Negative results should be treated as presumptive and confirmed by molecular assay, if necessary for patient management.   CBC W/ AUTO DIFFERENTIAL    Narrative:     The following orders were created for panel order CBC auto differential.  Procedure                               Abnormality         Status                     ---------                               -----------         ------                     CBC with Differential[[id number]]       Abnormal            Final result                 Please view results for these tests on the individual orders.   EXTRA TUBES    Narrative:     The following orders were created for panel order EXTRA TUBES.  Procedure                               Abnormality         Status                     ---------                               -----------         ------                     Gold Top Hold[[id number]]                                   In process                   Please view results for these tests on the individual orders.   GOLD TOP HOLD          Imaging Results              XR ABDOMEN, ACUTE 2 OR MORE VIEWS WITH CHEST (Final result)  Result time 04/30/24 21:17:05      Final result by Noah Hammond MD (04/30/24 21:17:05)                   Impression:      No acute radiographic abnormality in the abdomen.    A mild degree of fecal stasis/constipation is suspected.    Place of service: [facility]      Electronically signed by: Noah Hammond  Date:    04/30/2024  Time:    21:17               Narrative:     EXAMINATION:  XR ABDOMEN ACUTE 2 OR MORE VIEWS WITH CHEST    CLINICAL HISTORY:  nausea, vomiting, shortness of breath;    TECHNIQUE:  Abdomen flat and upright    COMPARISON:  Prior radiograph 05/07/2021    FINDINGS:  Bowel gas pattern is nonspecific and within normal limits. No abnormally dilated small bowel loops to suggest obstruction. There is no free air within the abdomen. There is a moderate amount of stool seen throughout the colon.  Cholecystectomy clips noted in the right upper quadrant    No abnormal calcific densities project within the abdomen.    Lung bases are predominantly clear. There is no acute osseous abnormality.                                       Medications   bisacodyL EC tablet 10 mg (has no administration in time range)   ondansetron disintegrating tablet 4 mg (4 mg Oral Given 4/30/24 2129)     Medical Decision Making  52 y/o WF presents to the emergency department with c/o congestion, cough, fatigue, abd cramping and vomiting. She states she initially started feeling badly on yesterday and progressed through today. She states she has vomited about three times this evening. She has had no fever or chills. She denies dysuria or hematuria.She states she had some mild shortness of breath but she does have hx of asthma. She states she got hot and took a shower and then vomited. She has had nothing for her symptoms. There are no particular exacerbating or remitting factors. She states she does smoke cigarettes daily. She denies alcohol or illicit drug use.    Problems Addressed:  Constipation, unspecified constipation type:     Details: Dulcolax tablets given. Rx for Miralax and antiemetics. Counseled on use and supportive measures. Follow up instructions given. Warning s/s discussed and return precautions given; the patient has v/u.    Shortness of breath:     Details: CXR negative. EKG with no acute ST or T wave abnormalities, reviewed by ED attending as well. Troponin negative. WBC count  is normal. COVID, Influenza negative. Patient in no acute distress and oxygenating well.     Amount and/or Complexity of Data Reviewed  Labs: ordered.  Radiology: ordered.    Risk  OTC drugs.  Prescription drug management.                                      Clinical Impression:   Final diagnoses:  [R06.02] Shortness of breath  [K59.00] Constipation, unspecified constipation type (Primary)        ED Disposition Condition    Discharge Stable          ED Prescriptions       Medication Sig Dispense Start Date End Date Auth. Provider    polyethylene glycol (GLYCOLAX) 17 gram PwPk Take 17 g by mouth once daily. 30 each 4/30/2024 5/30/2024 Katya Adrian FNP    ondansetron (ZOFRAN-ODT) 4 MG TbDL Take 1 tablet (4 mg total) by mouth every 8 (eight) hours as needed (nausea, vomiting). 15 tablet 4/30/2024 -- Katya Adrian FNP    promethazine (PHENERGAN) 25 MG tablet Take 1 tablet (25 mg total) by mouth every 6 (six) hours as needed for Nausea. 15 tablet 4/30/2024 -- Katya Adrian FNP          Follow-up Information       Follow up With Specialties Details Why Contact Info    Heath Doshi MD Family Medicine  As needed [id number] [place] [id number]  [phone]               Katya Adrian FNP  04/30/24 [id number]

## 2024-05-01 NOTE — ED TRIAGE NOTES
Chief Complaint   Patient presents with    General Illness     Pt presents to ed with c/o having chest congestion, cough, runny nose, fatigue and then started vomiting PTA

## 2024-05-01 NOTE — DISCHARGE INSTRUCTIONS
Use prescriptions as directed. Ensure you are drinking plenty of fluids. Ensure you are eating plenty of fresh fruits and vegetables. Follow up with your primary care provider if no improvement or otherwise as needed. Return to the ED for worsening signs and symptoms or otherwise as needed.

## 2024-05-06 ENCOUNTER — HOSPITAL ENCOUNTER (EMERGENCY)
Facility: HOSPITAL | Age: 54
Discharge: HOME | End: 2024-05-06
Payer: COMMERCIAL

## 2024-05-06 VITALS
HEART RATE: 81 BPM | HEIGHT: 63 IN | DIASTOLIC BLOOD PRESSURE: 83 MMHG | SYSTOLIC BLOOD PRESSURE: 127 MMHG | RESPIRATION RATE: 16 BRPM | TEMPERATURE: 98 F | WEIGHT: 154 LBS | OXYGEN SATURATION: 99 % | BODY MASS INDEX: 27.29 KG/M2

## 2024-05-06 DIAGNOSIS — R06.02 SHORTNESS OF BREATH: ICD-10-CM

## 2024-05-06 DIAGNOSIS — J40 BRONCHITIS: Primary | ICD-10-CM

## 2024-05-06 LAB
ALBUMIN SERPL BCP-MCNC: 3.3 G/DL (ref 3.5–5)
ALBUMIN/GLOB SERPL: 0.9 {RATIO}
ALP SERPL-CCNC: 97 U/L (ref 41–108)
ALT SERPL W P-5'-P-CCNC: 15 U/L (ref 13–56)
ANION GAP SERPL CALCULATED.3IONS-SCNC: 13 MMOL/L (ref 7–16)
AST SERPL W P-5'-P-CCNC: 17 U/L (ref 15–37)
BASOPHILS # BLD AUTO: 0.03 K/UL (ref 0–0.2)
BASOPHILS NFR BLD AUTO: 0.3 % (ref 0–1)
BILIRUB SERPL-MCNC: 0.4 MG/DL (ref ?–1.2)
BUN SERPL-MCNC: 9 MG/DL (ref 7–18)
BUN/CREAT SERPL: 10 (ref 6–20)
CALCIUM SERPL-MCNC: 9.1 MG/DL (ref 8.5–10.1)
CHLORIDE SERPL-SCNC: 101 MMOL/L (ref 98–107)
CO2 SERPL-SCNC: 27 MMOL/L (ref 21–32)
CREAT SERPL-MCNC: 0.92 MG/DL (ref 0.55–1.02)
DIFFERENTIAL METHOD BLD: ABNORMAL
EGFR (NO RACE VARIABLE) (RUSH/TITUS): 75 ML/MIN/1.73M2
EOSINOPHIL # BLD AUTO: 0.43 K/UL (ref 0–0.5)
EOSINOPHIL NFR BLD AUTO: 5 % (ref 1–4)
ERYTHROCYTE [DISTWIDTH] IN BLOOD BY AUTOMATED COUNT: 13.2 % (ref 11.5–14.5)
GLOBULIN SER-MCNC: 3.8 G/DL (ref 2–4)
GLUCOSE SERPL-MCNC: 82 MG/DL (ref 74–106)
HCT VFR BLD AUTO: 40.4 % (ref 38–47)
HGB BLD-MCNC: 13.7 G/DL (ref 12–16)
LYMPHOCYTES # BLD AUTO: 1.58 K/UL (ref 1–4.8)
LYMPHOCYTES NFR BLD AUTO: 18.4 % (ref 27–41)
MCH RBC QN AUTO: 29.6 PG (ref 27–31)
MCHC RBC AUTO-ENTMCNC: 33.9 G/DL (ref 32–36)
MCV RBC AUTO: 87.3 FL (ref 80–96)
MONOCYTES # BLD AUTO: 0.76 K/UL (ref 0–0.8)
MONOCYTES NFR BLD AUTO: 8.8 % (ref 2–6)
MPC BLD CALC-MCNC: 9.8 FL (ref 9.4–12.4)
NEUTROPHILS # BLD AUTO: 5.79 K/UL (ref 1.8–7.7)
NEUTROPHILS NFR BLD AUTO: 67.5 % (ref 53–65)
OHS QRS DURATION: 96 MS
OHS QTC CALCULATION: 442 MS
PLATELET # BLD AUTO: 280 K/UL (ref 150–400)
POTASSIUM SERPL-SCNC: 3.9 MMOL/L (ref 3.5–5.1)
PROT SERPL-MCNC: 7.1 G/DL (ref 6.4–8.2)
RBC # BLD AUTO: 4.63 M/UL (ref 4.2–5.4)
SODIUM SERPL-SCNC: 137 MMOL/L (ref 136–145)
TROPONIN I SERPL DL<=0.01 NG/ML-MCNC: 16.3 PG/ML
WBC # BLD AUTO: 8.59 K/UL (ref 4.5–11)

## 2024-05-06 PROCEDURE — 96372 THER/PROPH/DIAG INJ SC/IM: CPT | Performed by: NURSE PRACTITIONER

## 2024-05-06 PROCEDURE — 84484 ASSAY OF TROPONIN QUANT: CPT | Performed by: NURSE PRACTITIONER

## 2024-05-06 PROCEDURE — 99284 EMERGENCY DEPT VISIT MOD MDM: CPT | Mod: ,,, | Performed by: NURSE PRACTITIONER

## 2024-05-06 PROCEDURE — 93005 ELECTROCARDIOGRAM TRACING: CPT

## 2024-05-06 PROCEDURE — 94761 N-INVAS EAR/PLS OXIMETRY MLT: CPT

## 2024-05-06 PROCEDURE — 85025 COMPLETE CBC W/AUTO DIFF WBC: CPT | Performed by: NURSE PRACTITIONER

## 2024-05-06 PROCEDURE — 80053 COMPREHEN METABOLIC PANEL: CPT | Performed by: NURSE PRACTITIONER

## 2024-05-06 PROCEDURE — 93010 ELECTROCARDIOGRAM REPORT: CPT | Mod: ,,, | Performed by: HOSPITALIST

## 2024-05-06 PROCEDURE — 63600175 PHARM REV CODE 636 W HCPCS: Performed by: NURSE PRACTITIONER

## 2024-05-06 PROCEDURE — 99285 EMERGENCY DEPT VISIT HI MDM: CPT | Mod: 25

## 2024-05-06 RX ORDER — AZITHROMYCIN 250 MG/1
250 TABLET, FILM COATED ORAL DAILY
Qty: 6 TABLET | Refills: 0 | OUTPATIENT
Start: 2024-05-06 | End: 2024-05-06

## 2024-05-06 RX ORDER — GUAIFENESIN AND DEXTROMETHORPHAN HYDROBROMIDE 10; 100 MG/5ML; MG/5ML
5 SYRUP ORAL EVERY 4 HOURS PRN
Qty: 118 ML | Refills: 0 | Status: SHIPPED | OUTPATIENT
Start: 2024-05-06 | End: 2024-05-16

## 2024-05-06 RX ORDER — AZITHROMYCIN 250 MG/1
250 TABLET, FILM COATED ORAL DAILY
Qty: 6 TABLET | Refills: 0 | Status: SHIPPED | OUTPATIENT
Start: 2024-05-06

## 2024-05-06 RX ORDER — GUAIFENESIN AND DEXTROMETHORPHAN HYDROBROMIDE 10; 100 MG/5ML; MG/5ML
5 SYRUP ORAL EVERY 4 HOURS PRN
Qty: 118 ML | Refills: 0 | OUTPATIENT
Start: 2024-05-06 | End: 2024-05-06

## 2024-05-06 RX ORDER — GUAIFENESIN AND DEXTROMETHORPHAN HYDROBROMIDE 10; 100 MG/5ML; MG/5ML
5 SYRUP ORAL EVERY 4 HOURS PRN
Start: 2024-05-06 | End: 2024-05-06

## 2024-05-06 RX ORDER — DEXAMETHASONE SODIUM PHOSPHATE 4 MG/ML
4 INJECTION, SOLUTION INTRA-ARTICULAR; INTRALESIONAL; INTRAMUSCULAR; INTRAVENOUS; SOFT TISSUE
Status: COMPLETED | OUTPATIENT
Start: 2024-05-06 | End: 2024-05-06

## 2024-05-06 RX ADMIN — DEXAMETHASONE SODIUM PHOSPHATE 4 MG: 4 INJECTION, SOLUTION INTRA-ARTICULAR; INTRALESIONAL; INTRAMUSCULAR; INTRAVENOUS; SOFT TISSUE at 10:05

## 2024-05-06 NOTE — ED PROVIDER NOTES
Encounter Date: 5/6/2024       History     Chief Complaint   Patient presents with    Cough     Coughing x1 month     Patient presents to the ED with complaints of shortness of breath for one month. Reports she has been seen in the ED for this and was told she has bronchitis but has not improved. Patient reports she has not been treated with antibiotics, does admit to smoking. Denies any fever, reports cough is worse at night.     The history is provided by the patient.     Review of patient's allergies indicates:   Allergen Reactions    Betadine [povidone-iodine]     Propranolol     Iodinated contrast media Nausea And Vomiting    Lexapro [escitalopram] Nausea And Vomiting     Past Medical History:   Diagnosis Date    Anxiety attack     COVID-19 virus infection 08/27/2021    Diverticula, colon 06/25/2021    Family history of colonic polyps 06/25/2021    Polyp of descending colon 06/25/2021    Polyp of descending colon 06/25/2021    Polyp of transverse colon 06/25/2021    Polyp of transverse colon 06/25/2021    Positive colorectal cancer screening using Cologuard test 06/25/2021    Sinusitis 09/12/2023     Past Surgical History:   Procedure Laterality Date    CHOLECYSTECTOMY      HYSTERECTOMY      TUBAL LIGATION       Family History   Problem Relation Name Age of Onset    Diabetes Mellitus Other Positive Family Hx     Heart disease Other Positive Family Hx     Hypertension Other Positive Family Hx     Mental illness Other Positive Family Hx     Ovarian cancer Other Positive Family Hx      Social History     Tobacco Use    Smoking status: Every Day     Types: Cigarettes     Passive exposure: Never    Smokeless tobacco: Never   Substance Use Topics    Alcohol use: Not Currently    Drug use: Not Currently     Review of Systems   Constitutional: Negative.    HENT: Negative.     Respiratory:  Positive for cough and shortness of breath.    Cardiovascular: Negative.    Gastrointestinal: Negative.    Musculoskeletal:  Negative.    Neurological: Negative.    Psychiatric/Behavioral: Negative.     All other systems reviewed and are negative.      Physical Exam     Initial Vitals [05/06/24 0853]   BP Pulse Resp Temp SpO2   (!) 130/90 88 18 98.1 °F (36.7 °C) 98 %      MAP       --         Physical Exam    Vitals reviewed.  Constitutional: She appears well-developed and well-nourished.   HENT:   Head: Normocephalic and atraumatic.   Cardiovascular:  Normal rate, regular rhythm, normal heart sounds and intact distal pulses.           Pulmonary/Chest: Breath sounds normal.   Musculoskeletal:         General: Normal range of motion.     Neurological: She is alert and oriented to person, place, and time. She has normal strength. GCS score is 15. GCS eye subscore is 4. GCS verbal subscore is 5. GCS motor subscore is 6.   Skin: Skin is warm and dry. Capillary refill takes less than 2 seconds.   Psychiatric: She has a normal mood and affect. Her behavior is normal. Judgment and thought content normal.         Medical Screening Exam   See Full Note    ED Course   Procedures  Labs Reviewed   COMPREHENSIVE METABOLIC PANEL - Abnormal; Notable for the following components:       Result Value    Albumin 3.3 (*)     All other components within normal limits   CBC WITH DIFFERENTIAL - Abnormal; Notable for the following components:    Neutrophils % 67.5 (*)     Lymphocytes % 18.4 (*)     Monocytes % 8.8 (*)     Eosinophils % 5.0 (*)     All other components within normal limits   TROPONIN I - Normal   CBC W/ AUTO DIFFERENTIAL    Narrative:     The following orders were created for panel order CBC Auto Differential.  Procedure                               Abnormality         Status                     ---------                               -----------         ------                     CBC with Differential[[id number]]       Abnormal            Final result                 Please view results for these tests on the individual orders.          Imaging  Results              X-Ray Chest 1 View (Final result)  Result time 05/06/24 09:28:54      Final result by Aneesh Buckner DO (05/06/24 09:28:54)                   Impression:      No acute cardiopulmonary process demonstrated.    Point of Service: [facility]      Electronically signed by: Aneesh Buckner  Date:    05/06/2024  Time:    09:28               Narrative:    EXAMINATION:  XR CHEST 1 VIEW    CLINICAL HISTORY:  shortness of breath;    COMPARISON:  Chest x-ray November 3, 2022    TECHNIQUE:  Frontal view/views of the chest.    FINDINGS:  Heart size appears within normal limits.  No focal consolidation, pleural effusion, or pneumothorax.  Visualized osseous and surrounding soft tissue structures demonstrate no acute abnormality.                                       Medications   dexAMETHasone injection 4 mg (4 mg Intramuscular Given 5/6/24 1000)     Medical Decision Making  MDM    Patient presents for emergent evaluation of acute shortness of breath that poses a threat to life and/or bodily function.    In the ED patient found to have acute bronchitis.    I ordered labs and personally reviewed them.  Labs significant for WBC 8.59, H/H 13.7/40.4, Platelets 280, Na 137, K 3.9, Cl 101, BUN 9, Creat 0.92, Troponin 16.3  I ordered X-rays and personally reviewed them and reviewed the radiologist interpretation.  Chest Xray significant for no acute process.    I ordered EKG and personally reviewed it.  EKG significant for NSR with Right BBB.      Discharge MDM  I discussed the treatment and discharge plan with the patient.   Patient was managed in the ED with IM Decadron.    The response to treatment was good.    Patient was discharged in stable condition.  Detailed return precautions discussed.    Amount and/or Complexity of Data Reviewed  Labs: ordered.  Radiology: ordered.    Risk  OTC drugs.  Prescription drug management.                                      Clinical Impression:   Final  diagnoses:  [R06.02] Shortness of breath  [J40] Bronchitis (Primary)        ED Disposition Condition    Discharge Stable          ED Prescriptions       Medication Sig Dispense Start Date End Date Auth. Provider    dextromethorphan-guaiFENesin  mg/5 ml (ROBITUSSIN-DM)  mg/5 mL liquid  (Status: Discontinued) Take 5 mLs by mouth every 4 (four) hours as needed. -- 5/6/2024 5/6/2024 Lindsay Gage FNP    azithromycin (ZITHROMAX Z-CHARLIE) 250 MG tablet  (Status: Discontinued) Take 1 tablet (250 mg total) by mouth once daily. Take 500 mg initially then 250 mg daily. 6 tablet 5/6/2024 5/6/2024 Lindsay Gage FNP    azithromycin (ZITHROMAX Z-CHARLIE) 250 MG tablet  (Status: Discontinued) Take 1 tablet (250 mg total) by mouth once daily. Take 500 mg initially then 250 mg daily. 6 tablet 5/6/2024 5/6/2024 Lindsay Gage FNP dextromethorphan-guaiFENesin  mg/5 ml (ROBITUSSIN-DM)  mg/5 mL liquid  (Status: Discontinued) Take 5 mLs by mouth every 4 (four) hours as needed. 118 mL 5/6/2024 5/6/2024 Lindsay Gage FNP    azithromycin (ZITHROMAX Z-CHARLIE) 250 MG tablet Take 1 tablet (250 mg total) by mouth once daily. Take 500 mg initially then 250 mg daily. 6 tablet 5/6/2024 -- Lindsay Gage FNP    dextromethorphan-guaiFENesin  mg/5 ml (ROBITUSSIN-DM)  mg/5 mL liquid Take 5 mLs by mouth every 4 (four) hours as needed. 118 mL 5/6/2024 5/16/2024 Lindsay Gage FNP          Follow-up Information       Follow up With Specialties Details Why Contact Info    Heath Doshi MD Family Medicine In 1 week  [address] [place] [id number]  [phone]               Lindsay Gage FNP  05/06/24 9859

## 2024-05-07 RX ORDER — DEXAMETHASONE 0.75 MG/1
TABLET ORAL
Qty: 21 TABLET | Refills: 0 | Status: SHIPPED | OUTPATIENT
Start: 2024-05-07

## 2024-06-19 ENCOUNTER — OFFICE VISIT (OUTPATIENT)
Dept: FAMILY MEDICINE | Facility: CLINIC | Age: 54
End: 2024-06-19
Payer: COMMERCIAL

## 2024-06-19 VITALS
SYSTOLIC BLOOD PRESSURE: 109 MMHG | DIASTOLIC BLOOD PRESSURE: 77 MMHG | BODY MASS INDEX: 27.23 KG/M2 | HEART RATE: 80 BPM | WEIGHT: 153.69 LBS

## 2024-06-19 DIAGNOSIS — F41.1 GAD (GENERALIZED ANXIETY DISORDER): Chronic | ICD-10-CM

## 2024-06-19 DIAGNOSIS — F33.9 RECURRENT MAJOR DEPRESSIVE DISORDER, REMISSION STATUS UNSPECIFIED: Chronic | ICD-10-CM

## 2024-06-19 DIAGNOSIS — L29.9 ITCHING: ICD-10-CM

## 2024-06-19 DIAGNOSIS — R11.0 NAUSEA: Primary | ICD-10-CM

## 2024-06-19 PROCEDURE — 1160F RVW MEDS BY RX/DR IN RCRD: CPT | Mod: ,,, | Performed by: FAMILY MEDICINE

## 2024-06-19 PROCEDURE — 3044F HG A1C LEVEL LT 7.0%: CPT | Mod: ,,, | Performed by: FAMILY MEDICINE

## 2024-06-19 PROCEDURE — 3078F DIAST BP <80 MM HG: CPT | Mod: ,,, | Performed by: FAMILY MEDICINE

## 2024-06-19 PROCEDURE — 3074F SYST BP LT 130 MM HG: CPT | Mod: ,,, | Performed by: FAMILY MEDICINE

## 2024-06-19 PROCEDURE — 1159F MED LIST DOCD IN RCRD: CPT | Mod: ,,, | Performed by: FAMILY MEDICINE

## 2024-06-19 PROCEDURE — 99214 OFFICE O/P EST MOD 30 MIN: CPT | Mod: ,,, | Performed by: FAMILY MEDICINE

## 2024-06-19 PROCEDURE — 3008F BODY MASS INDEX DOCD: CPT | Mod: ,,, | Performed by: FAMILY MEDICINE

## 2024-06-19 RX ORDER — ALPRAZOLAM 1 MG/1
1 TABLET ORAL 3 TIMES DAILY PRN
Qty: 90 TABLET | Refills: 2 | Status: SHIPPED | OUTPATIENT
Start: 2024-06-19

## 2024-06-19 RX ORDER — FLUOXETINE HYDROCHLORIDE 40 MG/1
40 CAPSULE ORAL DAILY
Qty: 90 CAPSULE | Refills: 2 | Status: SHIPPED | OUTPATIENT
Start: 2024-06-19

## 2024-06-19 RX ORDER — ONDANSETRON 4 MG/1
4 TABLET, ORALLY DISINTEGRATING ORAL EVERY 8 HOURS PRN
Qty: 60 TABLET | Refills: 1 | Status: SHIPPED | OUTPATIENT
Start: 2024-06-19

## 2024-06-19 RX ORDER — DEXAMETHASONE 0.75 MG/1
TABLET ORAL
Qty: 15 TABLET | Refills: 0 | Status: SHIPPED | OUTPATIENT
Start: 2024-06-19

## 2024-06-19 NOTE — PROGRESS NOTES
Heath Doshi MD   [facility]  MEDICAL GROUP [facility]  [address] [place] [address] [place] [id number]  [phone]      PATIENT NAME: Merissa Ochoa  : 1970  DATE: 24  MRN: [id number]      Billing Provider: Heath Doshi MD  Level of Service:   Patient PCP Information       Provider PCP Type    Heath Doshi MD General            Reason for Visit / Chief Complaint: Medication Refill and Rash (Itches, all over)       Update PCP  Update Chief Complaint         History of Present Illness / Problem Focused Workflow     Merissa Ochoa presents to the clinic with Medication Refill and Rash (Itches, all over)       52 yo WF here for follow up MDD and EAMON.  Needs meds refilled.  Says that she is doing OK but having some grief due to her father dying a couple of weeks ago.  Has been working out in [place] and thinks that she came into contact with either poison ivy or oak.  Has itchy rash primarily on BUE.        Review of Systems     Review of Systems   Constitutional:  Negative for activity change, chills and fever.   HENT:  Negative for sore throat.    Eyes:  Negative for pain.   Respiratory:  Negative for cough, chest tightness and shortness of breath.    Cardiovascular:  Negative for chest pain and palpitations.   Gastrointestinal:  Negative for abdominal pain.   Integumentary:  Positive for rash.   Allergic/Immunologic: Positive for environmental allergies.   Neurological:  Negative for dizziness, syncope and weakness.   Psychiatric/Behavioral:  Negative for confusion.         Medical / Social / Family History     Past Medical History:   Diagnosis Date    Anxiety attack     COVID-19 virus infection 2021    Diverticula, colon 2021    Family history of colonic polyps 2021    Polyp of descending colon 2021    Polyp of descending colon 2021    Polyp of transverse colon 2021    Polyp of transverse colon 2021     Positive colorectal cancer screening using Cologuard test 06/25/2021    Sinusitis 09/12/2023       Past Surgical History:   Procedure Laterality Date    CHOLECYSTECTOMY      HYSTERECTOMY      TUBAL LIGATION         Social History    reports that she has been smoking cigarettes. She has never been exposed to tobacco smoke. She has never used smokeless tobacco. She reports that she does not currently use alcohol. She reports that she does not currently use drugs.   Social History     Tobacco Use    Smoking status: Every Day     Types: Cigarettes     Passive exposure: Never    Smokeless tobacco: Never   Substance Use Topics    Alcohol use: Not Currently    Drug use: Not Currently       Family History  Family History   Problem Relation Name Age of Onset    Diabetes Mellitus Other Positive Family Hx     Heart disease Other Positive Family Hx     Hypertension Other Positive Family Hx     Mental illness Other Positive Family Hx     Ovarian cancer Other Positive Family Hx        Medications and Allergies     Medications  Outpatient Medications Marked as Taking for the 6/19/24 encounter (Office Visit) with Heath Doshi MD   Medication Sig Dispense Refill    albuterol (PROAIR HFA) 90 mcg/actuation inhaler Inhale 2 puffs into the lungs every 6 (six) hours as needed for Wheezing. Rescue 18 g 2    famotidine (PEPCID) 20 MG tablet Take 1 tablet (20 mg total) by mouth 2 (two) times daily. 60 tablet 2    [DISCONTINUED] ALPRAZolam (XANAX) 1 MG tablet Take 1 tablet (1 mg total) by mouth 3 (three) times daily as needed for Anxiety. Clinic visit needed for additional refills 90 tablet 1    [DISCONTINUED] FLUoxetine 40 MG capsule Take 1 capsule (40 mg total) by mouth once daily. 90 capsule 2    [DISCONTINUED] ondansetron (ZOFRAN-ODT) 4 MG TbDL Take 1 tablet (4 mg total) by mouth every 8 (eight) hours as needed (nausea, vomiting). 15 tablet 0       Allergies  Review of patient's allergies indicates:   Allergen Reactions     Betadine [povidone-iodine]     Propranolol     Iodinated contrast media Nausea And Vomiting    Lexapro [escitalopram] Nausea And Vomiting       Physical Examination     Vitals:    06/19/24 1325   BP: 109/77   Pulse: 80     Physical Exam  Vitals reviewed.   Constitutional:       Appearance: Normal appearance.   HENT:      Head: Normocephalic and atraumatic.   Eyes:      Extraocular Movements: Extraocular movements intact.      Conjunctiva/sclera: Conjunctivae normal.      Pupils: Pupils are equal, round, and reactive to light.   Cardiovascular:      Rate and Rhythm: Normal rate and regular rhythm.      Heart sounds: Normal heart sounds.   Pulmonary:      Effort: Pulmonary effort is normal.      Breath sounds: Normal breath sounds.   Musculoskeletal:         General: Normal range of motion.      Cervical back: Normal range of motion.   Skin:     General: Skin is warm and dry.      Findings: Rash (erythematous maculopapular rash on both arms and hands) present.   Neurological:      General: No focal deficit present.      Mental Status: She is alert and oriented to person, place, and time.   Psychiatric:         Mood and Affect: Mood normal.         Behavior: Behavior normal.          Assessment and Plan (including Health Maintenance)      Problem List  Smart Sets  Document Outside HM   :    Plan: meds as prescribed and care as per ed handout.          Health Maintenance Due   Topic Date Due    Hepatitis C Screening  Never done    TETANUS VACCINE  Never done    Mammogram  Never done    Shingles Vaccine (1 of 2) Never done    COVID-19 Vaccine (1 - 2023-24 season) Never done       Problem List Items Addressed This Visit          Psychiatric    EAMON (generalized anxiety disorder) (Chronic)    Relevant Medications    ALPRAZolam (XANAX) 1 MG tablet    Recurrent major depressive disorder (Chronic)    Relevant Medications    FLUoxetine 40 MG capsule     Other Visit Diagnoses       Nausea    -  Primary    Relevant Medications     ondansetron (ZOFRAN-ODT) 4 MG TbDL    Itching        Relevant Medications    dexAMETHasone (DECADRON) 0.75 MG Tab            Health Maintenance Topics with due status: Not Due       Topic Last Completion Date    Colorectal Cancer Screening 06/25/2021    Lipid Panel 03/11/2022    Hemoglobin A1c (Diabetic Prevention Screening) 04/19/2024       Future Appointments   Date Time Provider Department Center   6/25/2024  9:00 AM Heath Doshi MD [facility]   7/31/2024  3:20 PM [facility]            Signature:  Heath Doshi MD  [facility]  MEDICAL GROUP [facility] - FAMILY MEDICINE  [address] [place] [id number]  [phone]    Date of encounter: 6/19/24

## 2024-06-26 ENCOUNTER — PATIENT OUTREACH (OUTPATIENT)
Facility: HOSPITAL | Age: 54
End: 2024-06-26
Payer: COMMERCIAL

## 2024-06-26 NOTE — PROGRESS NOTES
Population Health Chart Review & Patient Outreach Details      Further Action Needed If Patient Returns Outreach:            Updates Requested / Reviewed:     []  Care Everywhere    []     []  External Sources ([facility], [facility], [facility], etc.)    [] [facility]   [] [facility]   [] Other:    []  Care Team Updated   []  Removed  or Duplicate Orders   []  Immunization Reconciliation Completed / Queried    [] [place]   [] [place]   [] [place]   [] [place]      Health Maintenance Topics Addressed and Outreach Outcomes / Actions Taken:             Breast Cancer Screening []  Mammogram Order Placed    []  Mammogram Screening Scheduled    []  External Records Requested & Care Team Updated if Applicable    []  External Records Uploaded & Care Team Updated if Applicable    []  Pt Declined Scheduling Mammogram    []  Pt Will Schedule with External Provider / Order Routed & Care Team Updated if Applicable              Cervical Cancer Screening []  Pap Smear Scheduled in Primary Care or OBGYN    []  External Records Requested & Care Team Updated if Applicable       []  External Records Uploaded, Care Team Updated, & History Updated if Applicable    []  Patient Declined Scheduling Pap Smear    []  Patient Will Schedule with External Provider & Care Team Updated if Applicable                  Colorectal Cancer Screening []  Colonoscopy Case Request / Referral / Home Test Order Placed    []  External Records Requested & Care Team Updated if Applicable    []  External Records Uploaded, Care Team Updated, & History Updated if Applicable    []  Patient Declined Completing Colon Cancer Screening    []  Patient Will Schedule with External Provider & Care Team Updated if Applicable    []  Fit Kit Mailed (add the SmartPhrase under additional notes)    []  Reminded Patient to Complete Home Test                Diabetic Eye Exam []  Eye Exam Screening Order Placed    []  Eye Camera Scheduled or Optometry/Ophthalmology Referral  Placed    []  External Records Requested & Care Team Updated if Applicable    []  External Records Uploaded, Care Team Updated, & History Updated if Applicable    []  Patient Declined Scheduling Eye Exam    []  Patient Will Schedule with External Provider & Care Team Updated if Applicable             Blood Pressure Control []  Primary Care Follow Up Visit Scheduled     []  Remote Blood Pressure Reading Captured    []  Patient Declined Remote Reading or Scheduling Appt - Escalated to PCP    []  Patient Will Call Back or Send Portal Message with Reading                 HbA1c & Other Labs []  Overdue Lab(s) Ordered    []  Overdue Lab(s) Scheduled    []  External Records Uploaded & Care Team Updated if Applicable    []  Primary Care Follow Up Visit Scheduled     []  Reminded Patient to Complete A1c Home Test    []  Patient Declined Scheduling Labs or Will Call Back to Schedule    []  Patient Will Schedule with External Provider / Order Routed, & Care Team Updated if Applicable           Primary Care Appointment []  Primary Care Appt Scheduled    []  Patient Declined Scheduling or Will Call Back to Schedule    []  Pt Established with External Provider, Updated Care Team, & Informed Pt to Notify Payor if Applicable           Medication Adherence /    Statin Use []  Primary Care Appointment Scheduled    []  Patient Reminded to  Prescription    []  Patient Declined, Provider Notified if Needed    []  Sent Provider Message to Review to Evaluate Pt for Statin, Add Exclusion Dx Codes, Document   Exclusion in Problem List, Change Statin Intensity Level to Moderate or High Intensity if Applicable                Osteoporosis Screening []  Dexa Order Placed    []  Dexa Appointment Scheduled    []  External Records Requested & Care Team Updated    []  External Records Uploaded, Care Team Updated, & History Updated if Applicable    []  Patient Declined Scheduling Dexa or Will Call Back to Schedule    []  Patient Will Schedule  with External Provider / Order Routed & Care Team Updated if Applicable       Additional Notes:.  Pt needs appt for missed HY 6/25/24 sent to PES to schedule via staff message.

## 2024-10-02 ENCOUNTER — OFFICE VISIT (OUTPATIENT)
Dept: FAMILY MEDICINE | Facility: CLINIC | Age: 54
End: 2024-10-02
Payer: COMMERCIAL

## 2024-10-02 ENCOUNTER — HOSPITAL ENCOUNTER (OUTPATIENT)
Dept: RADIOLOGY | Facility: HOSPITAL | Age: 54
Discharge: HOME | End: 2024-10-02
Attending: FAMILY MEDICINE
Payer: COMMERCIAL

## 2024-10-02 VITALS
BODY MASS INDEX: 27.2 KG/M2 | HEIGHT: 63 IN | WEIGHT: 153.5 LBS | HEART RATE: 63 BPM | OXYGEN SATURATION: 96 % | SYSTOLIC BLOOD PRESSURE: 154 MMHG | DIASTOLIC BLOOD PRESSURE: 96 MMHG

## 2024-10-02 DIAGNOSIS — J40 BRONCHITIS: Chronic | ICD-10-CM

## 2024-10-02 DIAGNOSIS — U09.9 COVID-19 LONG HAULER: Chronic | ICD-10-CM

## 2024-10-02 DIAGNOSIS — F33.9 RECURRENT MAJOR DEPRESSIVE DISORDER, REMISSION STATUS UNSPECIFIED: Chronic | ICD-10-CM

## 2024-10-02 DIAGNOSIS — R22.9 SUBCUTANEOUS NODULE: ICD-10-CM

## 2024-10-02 DIAGNOSIS — R03.0 ELEVATED BLOOD PRESSURE READING: ICD-10-CM

## 2024-10-02 DIAGNOSIS — R22.9 SUBCUTANEOUS NODULE: Primary | ICD-10-CM

## 2024-10-02 DIAGNOSIS — Z12.39 ENCOUNTER FOR SCREENING FOR MALIGNANT NEOPLASM OF BREAST, UNSPECIFIED SCREENING MODALITY: ICD-10-CM

## 2024-10-02 DIAGNOSIS — R11.0 NAUSEA: Chronic | ICD-10-CM

## 2024-10-02 DIAGNOSIS — F41.1 GAD (GENERALIZED ANXIETY DISORDER): Chronic | ICD-10-CM

## 2024-10-02 PROCEDURE — 99214 OFFICE O/P EST MOD 30 MIN: CPT | Mod: ,,, | Performed by: FAMILY MEDICINE

## 2024-10-02 PROCEDURE — 76705 ECHO EXAM OF ABDOMEN: CPT | Mod: TC

## 2024-10-02 PROCEDURE — 3080F DIAST BP >= 90 MM HG: CPT | Mod: ,,, | Performed by: FAMILY MEDICINE

## 2024-10-02 PROCEDURE — 3077F SYST BP >= 140 MM HG: CPT | Mod: ,,, | Performed by: FAMILY MEDICINE

## 2024-10-02 PROCEDURE — 3044F HG A1C LEVEL LT 7.0%: CPT | Mod: ,,, | Performed by: FAMILY MEDICINE

## 2024-10-02 PROCEDURE — 3008F BODY MASS INDEX DOCD: CPT | Mod: ,,, | Performed by: FAMILY MEDICINE

## 2024-10-02 RX ORDER — FLUOXETINE HYDROCHLORIDE 40 MG/1
40 CAPSULE ORAL DAILY
Qty: 90 CAPSULE | Refills: 2 | Status: SHIPPED | OUTPATIENT
Start: 2024-10-02

## 2024-10-02 RX ORDER — ALPRAZOLAM 1 MG/1
1 TABLET ORAL 3 TIMES DAILY PRN
Qty: 90 TABLET | Refills: 2 | Status: SHIPPED | OUTPATIENT
Start: 2024-10-02

## 2024-10-02 RX ORDER — ONDANSETRON 4 MG/1
4 TABLET, ORALLY DISINTEGRATING ORAL EVERY 8 HOURS PRN
Qty: 60 TABLET | Refills: 1 | Status: SHIPPED | OUTPATIENT
Start: 2024-10-02

## 2024-10-02 RX ORDER — ALBUTEROL SULFATE 90 UG/1
2 INHALANT RESPIRATORY (INHALATION) EVERY 6 HOURS PRN
Qty: 18 G | Refills: 2 | Status: SHIPPED | OUTPATIENT
Start: 2024-10-02 | End: 2025-10-02

## 2024-10-02 NOTE — PROGRESS NOTES
Heath Doshi MD   [facility]  MEDICAL GROUP [facility] - FAMILY MEDICINE  [address] [place] [id number]  [phone]      PATIENT NAME: Merissa Ochoa  : 1970  DATE: 10/2/24  MRN: [id number]      Billing Provider: Heath Doshi MD  Level of Service: HI OFFICE/OUTPT VISIT, EST, LEVL IV, 30-39 MIN  Patient PCP Information       Provider PCP Type    Heath Doshi MD General            Reason for Visit / Chief Complaint: Medication Refill and knot right breast       Update PCP  Update Chief Complaint         History of Present Illness / Problem Focused Workflow     Merissa Ochoa presents to the clinic with Medication Refill and knot right breast       Missed earlier mammogram due to death of father.  Needs to be rescheduled.  Has noticed a knot on her flank, not really breast or axilla.      Needs meds refilled for MDD, EAMON and long COVID.    Medication Refill  Associated symptoms include coughing. Pertinent negatives include no abdominal pain, chest pain, chills, fever, rash, sore throat or weakness.       Review of Systems     Review of Systems   Constitutional:  Negative for activity change, chills and fever.   HENT:  Negative for sore throat.    Eyes:  Negative for pain.   Respiratory:  Positive for cough. Negative for chest tightness and shortness of breath.    Cardiovascular:  Negative for chest pain and palpitations.   Gastrointestinal:  Negative for abdominal pain.   Integumentary:  Positive for mole/lesion (subcutaneous nodule, firm, approx 2.5 cm, right flank). Negative for rash.   Allergic/Immunologic: Positive for environmental allergies.   Neurological:  Negative for dizziness, syncope and weakness.   Psychiatric/Behavioral:  Negative for confusion.         Medical / Social / Family History     Past Medical History:   Diagnosis Date    Anxiety attack     COVID-19 virus infection 2021    Diverticula, colon 2021    Family history of colonic  polyps 06/25/2021    Polyp of descending colon 06/25/2021    Polyp of descending colon 06/25/2021    Polyp of transverse colon 06/25/2021    Polyp of transverse colon 06/25/2021    Positive colorectal cancer screening using Cologuard test 06/25/2021    Sinusitis 09/12/2023       Past Surgical History:   Procedure Laterality Date    CHOLECYSTECTOMY      HYSTERECTOMY      TUBAL LIGATION         Social History    reports that she has been smoking cigarettes. She started smoking about 35 years ago. She has a 35.8 pack-year smoking history. She has never been exposed to tobacco smoke. She has never used smokeless tobacco. She reports that she does not currently use alcohol. She reports current drug use. Drug: Marijuana.   Social History     Tobacco Use    Smoking status: Every Day     Current packs/day: 1.00     Average packs/day: 1 pack/day for 35.8 years (35.8 ttl pk-yrs)     Types: Cigarettes     Start date: 1989     Passive exposure: Never    Smokeless tobacco: Never   Substance Use Topics    Alcohol use: Not Currently    Drug use: Yes     Types: Marijuana     Comment: medical card       Family History  Family History   Problem Relation Name Age of Onset    Diabetes Mellitus Other Positive Family Hx     Heart disease Other Positive Family Hx     Hypertension Other Positive Family Hx     Mental illness Other Positive Family Hx     Ovarian cancer Other Positive Family Hx        Medications and Allergies     Medications  Outpatient Medications Marked as Taking for the 10/2/24 encounter (Office Visit) with Heath Doshi MD   Medication Sig Dispense Refill    [DISCONTINUED] albuterol (PROAIR HFA) 90 mcg/actuation inhaler Inhale 2 puffs into the lungs every 6 (six) hours as needed for Wheezing. Rescue 18 g 2    [DISCONTINUED] ALPRAZolam (XANAX) 1 MG tablet Take 1 tablet (1 mg total) by mouth 3 (three) times daily as needed for Anxiety. Clinic visit needed for additional refills 90 tablet 2    [DISCONTINUED] FLUoxetine  40 MG capsule Take 1 capsule (40 mg total) by mouth once daily. 90 capsule 2    [DISCONTINUED] ondansetron (ZOFRAN-ODT) 4 MG TbDL Take 1 tablet (4 mg total) by mouth every 8 (eight) hours as needed (nausea, vomiting). 60 tablet 1       Allergies  Review of patient's allergies indicates:   Allergen Reactions    Betadine [povidone-iodine]     Propranolol     Iodinated contrast media Nausea And Vomiting    Lexapro [escitalopram] Nausea And Vomiting       Physical Examination     Vitals:    10/02/24 1100   BP: (!) 154/96   Pulse: 63     Physical Exam  Vitals reviewed.   Constitutional:       Appearance: Normal appearance.   HENT:      Head: Normocephalic and atraumatic.   Eyes:      Extraocular Movements: Extraocular movements intact.      Conjunctiva/sclera: Conjunctivae normal.      Pupils: Pupils are equal, round, and reactive to light.   Cardiovascular:      Rate and Rhythm: Normal rate and regular rhythm.      Heart sounds: Normal heart sounds.   Pulmonary:      Effort: Pulmonary effort is normal.      Breath sounds: Normal breath sounds.   Musculoskeletal:         General: Normal range of motion.      Cervical back: Normal range of motion.   Skin:     General: Skin is warm and dry.      Findings: Lesion (there is a subcutaneous nodule on flank at  9 o'clock in relation to breast) present.   Neurological:      General: No focal deficit present.      Mental Status: She is alert and oriented to person, place, and time.   Psychiatric:         Mood and Affect: Mood normal.         Behavior: Behavior normal.          Assessment and Plan (including Health Maintenance)      Problem List  Smart Sets  Document Outside HM   :    Plan: she had missed mammogram in July due to death in family.  We will get rescheduled.  She will monitor BP at home and we will call to get readings.        Health Maintenance Due   Topic Date Due    Hepatitis C Screening  Never done    TETANUS VACCINE  Never done    Mammogram  Never done     Shingles Vaccine (1 of 2) Never done    LDCT Lung Screen  08/26/2022    COVID-19 Vaccine (1 - 2024-25 season) Never done       Problem List Items Addressed This Visit          Psychiatric    EAMON (generalized anxiety disorder) (Chronic)    Relevant Medications    ALPRAZolam (XANAX) 1 MG tablet    Recurrent major depressive disorder (Chronic)    Relevant Medications    FLUoxetine 40 MG capsule       Pulmonary    Bronchitis (Chronic)    Relevant Medications    albuterol (PROAIR HFA) 90 mcg/actuation inhaler       ID    COVID-19 long hauler    Relevant Medications    albuterol (PROAIR HFA) 90 mcg/actuation inhaler     Other Visit Diagnoses       Subcutaneous nodule    -  Primary    right flank    Relevant Orders    US Soft Tissue Abdomen (Completed)    Nausea  (Chronic)       Relevant Medications    ondansetron (ZOFRAN-ODT) 4 MG TbDL    Encounter for screening for malignant neoplasm of breast, unspecified screening modality        Relevant Orders    Mammo Digital Screening Bilat w/ Juan    Elevated blood pressure reading        Relevant Medications    hydroCHLOROthiazide (HYDRODIURIL) 12.5 MG Tab            Health Maintenance Topics with due status: Not Due       Topic Last Completion Date    Colorectal Cancer Screening 06/25/2021    Lipid Panel 03/11/2022    Hemoglobin A1c (Diabetic Prevention Screening) 04/19/2024    RSV Vaccine (Age 60+ and Pregnant patients) Not Due       Future Appointments   Date Time Provider Department Center   12/3/2024  9:00 AM Heath Doshi MD [facility]   3/19/2025  3:20 PM [facility]            Signature:  Heath Doshi MD  [facility]  MEDICAL GROUP [facility] - FAMILY MEDICINE  [address] [place] [id number]  [phone]    Date of encounter: 10/2/24

## 2024-10-03 ENCOUNTER — TELEPHONE (OUTPATIENT)
Dept: FAMILY MEDICINE | Facility: CLINIC | Age: 54
End: 2024-10-03
Payer: COMMERCIAL

## 2024-10-03 RX ORDER — HYDROCHLOROTHIAZIDE 12.5 MG/1
12.5 TABLET ORAL DAILY
Qty: 30 TABLET | Refills: 2 | Status: SHIPPED | OUTPATIENT
Start: 2024-10-03

## 2024-10-03 NOTE — TELEPHONE ENCOUNTER
Pt called stated bp at home 147/111.  Symptoms included headache and feeling hot.  Pt would like to start bp med.  Told pt to go to ER if bp gets higher or if started having more symptoms

## 2024-10-04 ENCOUNTER — HOSPITAL ENCOUNTER (EMERGENCY)
Facility: HOSPITAL | Age: 54
Discharge: HOME | End: 2024-10-04
Payer: COMMERCIAL

## 2024-10-04 VITALS
DIASTOLIC BLOOD PRESSURE: 88 MMHG | WEIGHT: 153 LBS | TEMPERATURE: 98 F | RESPIRATION RATE: 17 BRPM | HEART RATE: 56 BPM | BODY MASS INDEX: 27.11 KG/M2 | HEIGHT: 63 IN | SYSTOLIC BLOOD PRESSURE: 134 MMHG | OXYGEN SATURATION: 96 %

## 2024-10-04 DIAGNOSIS — I10 HYPERTENSION, UNSPECIFIED TYPE: Primary | ICD-10-CM

## 2024-10-04 DIAGNOSIS — R07.89 CHEST DISCOMFORT: ICD-10-CM

## 2024-10-04 LAB
ANION GAP SERPL CALCULATED.3IONS-SCNC: 8 MMOL/L (ref 7–16)
APTT PPP: 28.3 SECONDS (ref 25.2–37.3)
BASOPHILS # BLD AUTO: 0.04 K/UL (ref 0–0.2)
BASOPHILS NFR BLD AUTO: 0.6 % (ref 0–1)
BUN SERPL-MCNC: 11 MG/DL (ref 7–18)
BUN/CREAT SERPL: 11 (ref 6–20)
CALCIUM SERPL-MCNC: 10.4 MG/DL (ref 8.5–10.1)
CHLORIDE SERPL-SCNC: 101 MMOL/L (ref 98–107)
CO2 SERPL-SCNC: 31 MMOL/L (ref 21–32)
CREAT SERPL-MCNC: 1.04 MG/DL (ref 0.55–1.02)
D DIMER PPP FEU-MCNC: <0.27 ΜG/ML (ref 0–0.47)
DIFFERENTIAL METHOD BLD: ABNORMAL
EGFR (NO RACE VARIABLE) (RUSH/TITUS): 64 ML/MIN/1.73M2
EOSINOPHIL # BLD AUTO: 0.46 K/UL (ref 0–0.5)
EOSINOPHIL NFR BLD AUTO: 7 % (ref 1–4)
ERYTHROCYTE [DISTWIDTH] IN BLOOD BY AUTOMATED COUNT: 13.5 % (ref 11.5–14.5)
GLUCOSE SERPL-MCNC: 101 MG/DL (ref 74–106)
HCT VFR BLD AUTO: 45.3 % (ref 38–47)
HGB BLD-MCNC: 15.1 G/DL (ref 12–16)
IMM GRANULOCYTES # BLD AUTO: 0.02 K/UL (ref 0–0.04)
IMM GRANULOCYTES NFR BLD: 0.3 % (ref 0–0.4)
INR BLD: 0.88
LYMPHOCYTES # BLD AUTO: 1.14 K/UL (ref 1–4.8)
LYMPHOCYTES NFR BLD AUTO: 17.4 % (ref 27–41)
MAGNESIUM SERPL-MCNC: 2.3 MG/DL (ref 1.7–2.3)
MCH RBC QN AUTO: 29.3 PG (ref 27–31)
MCHC RBC AUTO-ENTMCNC: 33.3 G/DL (ref 32–36)
MCV RBC AUTO: 88 FL (ref 80–96)
MONOCYTES # BLD AUTO: 0.41 K/UL (ref 0–0.8)
MONOCYTES NFR BLD AUTO: 6.3 % (ref 2–6)
MPC BLD CALC-MCNC: 10.4 FL (ref 9.4–12.4)
NEUTROPHILS # BLD AUTO: 4.49 K/UL (ref 1.8–7.7)
NEUTROPHILS NFR BLD AUTO: 68.4 % (ref 53–65)
NRBC # BLD AUTO: 0 X10E3/UL
NRBC, AUTO (.00): 0 %
OHS QRS DURATION: 90 MS
OHS QTC CALCULATION: 448 MS
PLATELET # BLD AUTO: 263 K/UL (ref 150–400)
POTASSIUM SERPL-SCNC: 3.9 MMOL/L (ref 3.5–5.1)
PROTHROMBIN TIME: 11.9 SECONDS (ref 11.7–14.7)
RBC # BLD AUTO: 5.15 M/UL (ref 4.2–5.4)
SODIUM SERPL-SCNC: 136 MMOL/L (ref 136–145)
TROPONIN I SERPL DL<=0.01 NG/ML-MCNC: 30.9 PG/ML
TROPONIN I SERPL DL<=0.01 NG/ML-MCNC: 34 PG/ML
WBC # BLD AUTO: 6.56 K/UL (ref 4.5–11)

## 2024-10-04 PROCEDURE — 84484 ASSAY OF TROPONIN QUANT: CPT | Performed by: NURSE PRACTITIONER

## 2024-10-04 PROCEDURE — 83735 ASSAY OF MAGNESIUM: CPT | Performed by: NURSE PRACTITIONER

## 2024-10-04 PROCEDURE — 80048 BASIC METABOLIC PNL TOTAL CA: CPT | Performed by: NURSE PRACTITIONER

## 2024-10-04 PROCEDURE — 85730 THROMBOPLASTIN TIME PARTIAL: CPT | Performed by: NURSE PRACTITIONER

## 2024-10-04 PROCEDURE — 85025 COMPLETE CBC W/AUTO DIFF WBC: CPT | Performed by: NURSE PRACTITIONER

## 2024-10-04 PROCEDURE — 85610 PROTHROMBIN TIME: CPT | Performed by: NURSE PRACTITIONER

## 2024-10-04 PROCEDURE — 93005 ELECTROCARDIOGRAM TRACING: CPT

## 2024-10-04 PROCEDURE — 99285 EMERGENCY DEPT VISIT HI MDM: CPT | Mod: 25

## 2024-10-04 PROCEDURE — 93010 ELECTROCARDIOGRAM REPORT: CPT | Mod: ,,, | Performed by: INTERNAL MEDICINE

## 2024-10-04 PROCEDURE — 36415 COLL VENOUS BLD VENIPUNCTURE: CPT | Performed by: NURSE PRACTITIONER

## 2024-10-04 PROCEDURE — 85379 FIBRIN DEGRADATION QUANT: CPT | Performed by: NURSE PRACTITIONER

## 2024-10-04 NOTE — ED PROVIDER NOTES
Encounter Date: 10/4/2024       History     Chief Complaint   Patient presents with    Hypertension     Pt reports having high blood pressure at home. She reports 167/112 at home around 0500       53-year-old female presents to the emergency department to be evaluated for elevated blood pressure reading.  She said that she has also had some chest discomfort over the last several days.  She has also had some mild shortness of breath with exertion.    The history is provided by the patient.   Hypertension   This is a chronic problem. Associated symptoms include chest pain. Pertinent negatives include no orthopnea, no palpitations, no PND, no anxiety, no confusion, no malaise/fatigue, no blurred vision, no headaches, no tinnitus, no neck pain, no peripheral edema, no dizziness and no shortness of breath.     Review of patient's allergies indicates:   Allergen Reactions    Betadine [povidone-iodine]     Propranolol     Iodinated contrast media Nausea And Vomiting    Lexapro [escitalopram] Nausea And Vomiting     Past Medical History:   Diagnosis Date    Anxiety attack     COVID-19 virus infection 08/27/2021    Diverticula, colon 06/25/2021    Family history of colonic polyps 06/25/2021    Polyp of descending colon 06/25/2021    Polyp of descending colon 06/25/2021    Polyp of transverse colon 06/25/2021    Polyp of transverse colon 06/25/2021    Positive colorectal cancer screening using Cologuard test 06/25/2021    Sinusitis 09/12/2023     Past Surgical History:   Procedure Laterality Date    CHOLECYSTECTOMY      HYSTERECTOMY      TUBAL LIGATION       Family History   Problem Relation Name Age of Onset    Diabetes Mellitus Other Positive Family Hx     Heart disease Other Positive Family Hx     Hypertension Other Positive Family Hx     Mental illness Other Positive Family Hx     Ovarian cancer Other Positive Family Hx      Social History     Tobacco Use    Smoking status: Every Day     Current packs/day: 1.00      Average packs/day: 1 pack/day for 35.8 years (35.8 ttl pk-yrs)     Types: Cigarettes     Start date: 1989     Passive exposure: Never    Smokeless tobacco: Never   Substance Use Topics    Alcohol use: Not Currently    Drug use: Yes     Types: Marijuana     Comment: medical card     Review of Systems   Constitutional:  Negative for chills, fatigue, fever and malaise/fatigue.   HENT:  Negative for tinnitus.    Eyes:  Negative for blurred vision.   Respiratory:  Negative for shortness of breath.    Cardiovascular:  Positive for chest pain. Negative for palpitations, orthopnea and PND.   Musculoskeletal:  Negative for neck pain.   Neurological:  Negative for dizziness and headaches.   Psychiatric/Behavioral:  Negative for confusion.    All other systems reviewed and are negative.      Physical Exam     Initial Vitals [10/04/24 0813]   BP Pulse Resp Temp SpO2   131/85 69 17 98.1 °F (36.7 °C) 97 %      MAP       --         Physical Exam    Vitals reviewed.  Constitutional: She appears well-developed and well-nourished.   Neck: Neck supple.   Cardiovascular:  Normal rate and regular rhythm.           Pulmonary/Chest: Breath sounds normal.   Abdominal: Abdomen is soft. Bowel sounds are normal. She exhibits no distension and no mass. There is no abdominal tenderness. There is no rebound and no guarding.   Musculoskeletal:         General: No tenderness or edema. Normal range of motion.      Cervical back: Neck supple.     Neurological: She is alert and oriented to person, place, and time. She has normal strength. GCS score is 15. GCS eye subscore is 4. GCS verbal subscore is 5. GCS motor subscore is 6.   Skin: Skin is warm and dry. Capillary refill takes less than 2 seconds.   Psychiatric: She has a normal mood and affect.         Medical Screening Exam   See Full Note    ED Course   Procedures  Labs Reviewed   BASIC METABOLIC PANEL - Abnormal       Result Value    Sodium 136      Potassium 3.9      Chloride 101      CO2 31       Anion Gap 8      Glucose 101      BUN 11      Creatinine 1.04 (*)     BUN/Creatinine Ratio 11      Calcium 10.4 (*)     eGFR 64     CBC WITH DIFFERENTIAL - Abnormal    WBC 6.56      RBC 5.15      Hemoglobin 15.1      Hematocrit 45.3      MCV 88.0      MCH 29.3      MCHC 33.3      RDW 13.5      Platelet Count 263      MPV 10.4      Neutrophils % 68.4 (*)     Lymphocytes % 17.4 (*)     Monocytes % 6.3 (*)     Eosinophils % 7.0 (*)     Basophils % 0.6      Immature Granulocytes % 0.3      nRBC, Auto 0.0      Neutrophils, Abs 4.49      Lymphocytes, Absolute 1.14      Monocytes, Absolute 0.41      Eosinophils, Absolute 0.46      Basophils, Absolute 0.04      Immature Granulocytes, Absolute 0.02      nRBC, Absolute 0.00      Diff Type Auto     TROPONIN I - Normal    Troponin I High Sensitivity 34.0     PROTIME-INR - Normal    PT 11.9      INR 0.88     APTT - Normal    PTT 28.3     MAGNESIUM - Normal    Magnesium 2.3     D DIMER, QUANTITATIVE - Normal    D-Dimer <0.27     TROPONIN I - Normal    Troponin I High Sensitivity 30.9     CBC W/ AUTO DIFFERENTIAL    Narrative:     The following orders were created for panel order CBC Auto Differential.  Procedure                               Abnormality         Status                     ---------                               -----------         ------                     CBC with Differential[[id number]]       Abnormal            Final result                 Please view results for these tests on the individual orders.          Imaging Results              X-Ray Chest 1 View (Final result)  Result time 10/04/24 08:53:45      Final result by Rivas Mays MD (10/04/24 08:53:45)                   Impression:      No convincing evidence of acute detrimental change relative prior chest radiograph performed 05/06/2024, 08:16 hours.      Electronically signed by: Rivas Mays  Date:    10/04/2024  Time:    08:53               Narrative:    EXAMINATION:  XR CHEST 1  VIEW    CLINICAL HISTORY:  Other chest pain    TECHNIQUE:  Single frontal view of the chest was performed.    COMPARISON:  Chest radiograph performed 05/06/2024, 08:16 hours.    FINDINGS:  Cardiomediastinal contours appear grossly unchanged.  Atherosclerosis of the aorta.    Chronic interstitial coarsening, relatively similar prior examination performed 05/06/2024, 08:16 hours.    No definite acute appearing focal airspace consolidation identified.    No acute findings the visualized abdomen.  Osseous sequela project in the right upper quadrant.    Osseous and soft tissue structures appear without definite acute change.                                       Medications - No data to display  Medical Decision Making  53-year-old female presents to the emergency department to be evaluated for elevated blood pressure reading.  She said that she has also had some chest discomfort over the last several days.  She has also had some mild shortness of breath with exertion.  EKG ordered, reviewed by Dr. Read, significant for rate of 61, sinus rhythm, no STEMI  X-ray ordered, films reviewed as well as radiologist's interpretation significant for no acute process  Labs ordered and reviewed  Diagnosis: Chest discomfort, hypertension    Amount and/or Complexity of Data Reviewed  Labs: ordered.  Radiology: ordered.                                      Clinical Impression:   Final diagnoses:  [R07.89] Chest discomfort  [I10] Hypertension, unspecified type (Primary)        ED Disposition Condition    Discharge Stable          ED Prescriptions    None       Follow-up Information    None          Sharmila Herbert, ROSALIE  10/04/24 1134

## 2024-10-04 NOTE — DISCHARGE INSTRUCTIONS
Follow up with your primary care provider in 2 days. Return to the emergency department for any exertional pain, change in the nature of your pain, increase in symptoms or for any other new or worrisome symptoms.  Keep a log of your blood pressure readings and take them to your primary care provider

## 2024-10-10 DIAGNOSIS — R22.9 SUBCUTANEOUS NODULE: Primary | ICD-10-CM

## 2024-10-16 ENCOUNTER — OFFICE VISIT (OUTPATIENT)
Dept: SURGERY | Facility: CLINIC | Age: 54
End: 2024-10-16
Attending: SURGERY
Payer: COMMERCIAL

## 2024-10-16 VITALS — HEIGHT: 63 IN | WEIGHT: 153 LBS | BODY MASS INDEX: 27.11 KG/M2

## 2024-10-16 DIAGNOSIS — D17.1 LIPOMA OF TORSO: Primary | ICD-10-CM

## 2024-10-16 PROCEDURE — 88305 TISSUE EXAM BY PATHOLOGIST: CPT | Mod: TC,SUR | Performed by: SURGERY

## 2024-10-16 PROCEDURE — 99999 PR PBB SHADOW E&M-EST. PATIENT-LVL III: CPT | Mod: PBBFAC,,, | Performed by: SURGERY

## 2024-10-16 PROCEDURE — 22902 EXC ABD LES SC < 3 CM: CPT | Mod: PBBFAC | Performed by: SURGERY

## 2024-10-16 PROCEDURE — 99213 OFFICE O/P EST LOW 20 MIN: CPT | Mod: PBBFAC | Performed by: SURGERY

## 2024-10-16 NOTE — PROCEDURES
Preoperative diagnosis: Soft tissue lesion of abdominal wall  Postoperative diagnosis: as above  Procedure Performed: Excision of soft tissue mass of abdominal wall    Surgeon: Dr. Kevin Chavis  Anesthesia: Local 1% lidocaine with epinephrine  Blood loss: Minimal  Complications: None  Specimen: Soft tissue mass of abdominal wall.  2.5by 1 and half by 1 cm  After verbal time-out site confirmation  Procedure in detail:  After informed consent patient's abdominal region was prepped and draped in the usual sterile fashion.  Injected local in and around the area.  We made an incision over the soft tissue mass and  we then dissected out this mass.  We excised it completely with gross negative margins.  We then ensured hemostasis and closed this with some interrupted 3-0 chromic sutures.  Area was dressed and patient tolerated the procedure well.

## 2024-10-16 NOTE — PROGRESS NOTES
General Surgery History and Physical      Patient ID: Merissa Ochoa is a 53 y.o. female.    Chief Complaint: Pre-op Exam (Nodule of (R) side )      HPI:  53-year-old female with a few month history of this tender nodule in the right upper abdominal region.  Never had this anywhere else in her body before.  Maybe getting a little larger.  Occasional pain.  No bleeding no nausea no vomiting.  Ultrasound showed possible lipoma    Review of Systems   Constitutional:  Negative for activity change, appetite change, fatigue and fever.   HENT:  Negative for trouble swallowing.    Respiratory:  Negative for cough and shortness of breath.    Cardiovascular:  Negative for chest pain and palpitations.   Gastrointestinal:  Negative for abdominal distention, abdominal pain, blood in stool, constipation and diarrhea.   Genitourinary:  Negative for flank pain.   Musculoskeletal:  Negative for neck pain and neck stiffness.   Neurological:  Negative for weakness.       Current Outpatient Medications   Medication Sig Dispense Refill    albuterol (PROAIR HFA) 90 mcg/actuation inhaler Inhale 2 puffs into the lungs every 6 (six) hours as needed for Wheezing. Rescue 18 g 2    ALPRAZolam (XANAX) 1 MG tablet Take 1 tablet (1 mg total) by mouth 3 (three) times daily as needed for Anxiety. Clinic visit needed for additional refills 90 tablet 2    azithromycin (ZITHROMAX Z-CHARLIE) 250 MG tablet Take 1 tablet (250 mg total) by mouth once daily. Take 500 mg initially then 250 mg daily. (Patient not taking: Reported on 10/2/2024) 6 tablet 0    dexAMETHasone (DECADRON) 0.75 MG Tab 2 tablets PO in AM and 1 tablet PO in PM (Patient not taking: Reported on 10/2/2024) 21 tablet 0    dexAMETHasone (DECADRON) 0.75 MG Tab 1 tablet PO BID (Patient not taking: Reported on 10/2/2024) 15 tablet 0    famotidine (PEPCID) 20 MG tablet Take 1 tablet (20 mg total) by mouth 2  (two) times daily. 60 tablet 2    FLUoxetine 40 MG capsule Take 1 capsule (40 mg total) by mouth once daily. 90 capsule 2    fluticasone propionate (FLONASE) 50 mcg/actuation nasal spray 2 sprays (100 mcg total) by Each Nostril route once daily. (Patient not taking: Reported on 10/2/2024) 16 g 2    hydroCHLOROthiazide (HYDRODIURIL) 12.5 MG Tab Take 1 tablet (12.5 mg total) by mouth once daily. For blood pressure 30 tablet 2    ondansetron (ZOFRAN-ODT) 4 MG TbDL Take 1 tablet (4 mg total) by mouth every 8 (eight) hours as needed (nausea). (Patient not taking: Reported on 10/2/2024) 30 tablet 1    ondansetron (ZOFRAN-ODT) 4 MG TbDL Take 1 tablet (4 mg total) by mouth every 8 (eight) hours as needed (nausea, vomiting). 60 tablet 1    promethazine (PHENERGAN) 25 MG tablet Take 1 tablet (25 mg total) by mouth every 6 (six) hours as needed for Nausea. (Patient not taking: Reported on 10/2/2024) 15 tablet 0     No current facility-administered medications for this visit.       Review of patient's allergies indicates:   Allergen Reactions    Betadine [povidone-iodine]     Propranolol     Iodinated contrast media Nausea And Vomiting    Lexapro [escitalopram] Nausea And Vomiting       Past Medical History:   Diagnosis Date    Anxiety attack     COVID-19 virus infection 08/27/2021    Diverticula, colon 06/25/2021    Family history of colonic polyps 06/25/2021    Polyp of descending colon 06/25/2021    Polyp of descending colon 06/25/2021    Polyp of transverse colon 06/25/2021    Polyp of transverse colon 06/25/2021    Positive colorectal cancer screening using Cologuard test 06/25/2021    Sinusitis 09/12/2023       Past Surgical History:   Procedure Laterality Date    CHOLECYSTECTOMY      HYSTERECTOMY      TUBAL LIGATION         Family History   Problem Relation Name Age of Onset    Diabetes Mellitus Other Positive Family Hx     Heart disease Other Positive Family Hx     Hypertension Other Positive Family Hx     Mental  illness Other Positive Family Hx     Ovarian cancer Other Positive Family Hx        Social History     Socioeconomic History    Marital status:    Tobacco Use    Smoking status: Every Day     Current packs/day: 1.00     Average packs/day: 1 pack/day for 35.8 years (35.8 ttl pk-yrs)     Types: Cigarettes     Start date: 1989     Passive exposure: Never    Smokeless tobacco: Never   Substance and Sexual Activity    Alcohol use: Not Currently    Drug use: Yes     Types: Marijuana     Comment: medical card       There were no vitals filed for this visit.    Physical Exam  Constitutional:       General: She is not in acute distress.  HENT:      Head: Normocephalic.   Cardiovascular:      Rate and Rhythm: Normal rate and regular rhythm.      Pulses: Normal pulses.   Pulmonary:      Effort: Pulmonary effort is normal. No respiratory distress.      Breath sounds: Normal breath sounds.   Abdominal:      General: Abdomen is flat. There is no distension.      Palpations: Abdomen is soft.      Tenderness: There is no abdominal tenderness.   Musculoskeletal:         General: Normal range of motion.   Skin:     General: Skin is warm.      Findings: Lesion (Two and half by 1 x 1 and 0.5 cm soft tissue nodule right abdominal area consistent with where the ultrasound showed set with a lipoma) present.   Neurological:      General: No focal deficit present.      Mental Status: She is oriented to person, place, and time.     2 Result Notes  Details    Reading Physician Reading Date Result Priority   Carolin Mtz MD  [phone] 10/4/2024 Routine     Narrative & Impression  EXAMINATION:  US SOFT TISSUE, ABDOMEN     CLINICAL HISTORY:  nodule;  Localized swelling, mass and lump, unspecified     TECHNIQUE:  Transverse and sagittal images were obtained of the area of interest over the right upper chest wall     COMPARISON:  None     FINDINGS:  There is an echogenic defined lesion identified in the area of interest  measuring 1.7 cm cephalocaudal by 2.6 cm transverse by 1.2 cm AP.  Color Doppler demonstrates minimal internal color flow.  There is mild shadowing posterior to the lesion.     Impression:     Echogenic well-defined lesion identified within the chest wall area of interest which is of higher echogenicity than that of the surrounding tissues.  Differential diagnosis includes a benign lipoma.  Cannot differentiate by ultrasound a more ominous but less likely fatty lesion such as a liposarcoma.  Correlate with clinical history of interval change on physical exam and follow-up to confirm stability as needed.        Electronically signed by:Carolin Mtz  Date:                                            10/04/2024  Time:                                           18:13       Assessment & Plan:    Lipoma of torso  -     Ambulatory referral/consult to General Surgery  -     Surgical Pathology        Patient will go to the minor room for excision of the soft tissue mass.  Risks and benefits explained.  All questions answered.

## 2024-10-18 LAB
ESTROGEN SERPL-MCNC: NORMAL PG/ML
INSULIN SERPL-ACNC: NORMAL U[IU]/ML
LAB AP CLINICAL INFORMATION: NORMAL
LAB AP GROSS DESCRIPTION: NORMAL
LAB AP LABORATORY NOTES: NORMAL
T3RU NFR SERPL: NORMAL %

## 2024-12-03 ENCOUNTER — OFFICE VISIT (OUTPATIENT)
Dept: FAMILY MEDICINE | Facility: CLINIC | Age: 54
End: 2024-12-03
Payer: COMMERCIAL

## 2024-12-03 VITALS
SYSTOLIC BLOOD PRESSURE: 126 MMHG | OXYGEN SATURATION: 98 % | DIASTOLIC BLOOD PRESSURE: 87 MMHG | HEIGHT: 63 IN | BODY MASS INDEX: 26.9 KG/M2 | WEIGHT: 151.81 LBS | HEART RATE: 66 BPM

## 2024-12-03 DIAGNOSIS — F41.1 GAD (GENERALIZED ANXIETY DISORDER): Chronic | ICD-10-CM

## 2024-12-03 DIAGNOSIS — Z13.220 SCREENING FOR LIPOID DISORDERS: ICD-10-CM

## 2024-12-03 DIAGNOSIS — Z13.1 SCREENING FOR DIABETES MELLITUS: ICD-10-CM

## 2024-12-03 DIAGNOSIS — Z00.00 ROUTINE GENERAL MEDICAL EXAMINATION AT A HEALTH CARE FACILITY: Primary | ICD-10-CM

## 2024-12-03 DIAGNOSIS — R03.0 ELEVATED BLOOD PRESSURE READING: ICD-10-CM

## 2024-12-03 LAB
CHOLEST SERPL-MCNC: 226 MG/DL
CHOLEST/HDLC SERPL: 4.6 {RATIO}
GLUCOSE SERPL-MCNC: 90 MG/DL (ref 74–100)
HDLC SERPL-MCNC: 49 MG/DL (ref 35–60)
LDLC SERPL CALC-MCNC: 152 MG/DL
LDLC/HDLC SERPL: 3.1 {RATIO}
NONHDLC SERPL-MCNC: 177 MG/DL
TRIGL SERPL-MCNC: 125 MG/DL (ref 37–140)
VLDLC SERPL-MCNC: 25 MG/DL

## 2024-12-03 PROCEDURE — 3079F DIAST BP 80-89 MM HG: CPT | Mod: ,,, | Performed by: FAMILY MEDICINE

## 2024-12-03 PROCEDURE — 99396 PREV VISIT EST AGE 40-64: CPT | Mod: ,,, | Performed by: FAMILY MEDICINE

## 2024-12-03 PROCEDURE — 80061 LIPID PANEL: CPT | Mod: ,,, | Performed by: CLINICAL MEDICAL LABORATORY

## 2024-12-03 PROCEDURE — 1160F RVW MEDS BY RX/DR IN RCRD: CPT | Mod: ,,, | Performed by: FAMILY MEDICINE

## 2024-12-03 PROCEDURE — 3044F HG A1C LEVEL LT 7.0%: CPT | Mod: ,,, | Performed by: FAMILY MEDICINE

## 2024-12-03 PROCEDURE — 1159F MED LIST DOCD IN RCRD: CPT | Mod: ,,, | Performed by: FAMILY MEDICINE

## 2024-12-03 PROCEDURE — 82947 ASSAY GLUCOSE BLOOD QUANT: CPT | Mod: ,,, | Performed by: CLINICAL MEDICAL LABORATORY

## 2024-12-03 PROCEDURE — 3008F BODY MASS INDEX DOCD: CPT | Mod: ,,, | Performed by: FAMILY MEDICINE

## 2024-12-03 PROCEDURE — 3074F SYST BP LT 130 MM HG: CPT | Mod: ,,, | Performed by: FAMILY MEDICINE

## 2024-12-03 RX ORDER — HYDROCHLOROTHIAZIDE 12.5 MG/1
12.5 TABLET ORAL DAILY
Qty: 30 TABLET | Refills: 2 | Status: SHIPPED | OUTPATIENT
Start: 2024-12-03

## 2024-12-03 RX ORDER — ALPRAZOLAM 1 MG/1
1 TABLET ORAL 3 TIMES DAILY PRN
Qty: 90 TABLET | Refills: 2 | Status: SHIPPED | OUTPATIENT
Start: 2024-12-03

## 2024-12-03 NOTE — PATIENT INSTRUCTIONS
"Patient Education       Diet and Health   The Basics   Written by the doctors and editors at [facility]   Why is it important to eat a healthy diet? -- It's important to eat a healthy diet because eating the right foods can keep you healthy now and later on in life.  Which foods are especially healthy? -- Foods that are especially healthy include:  Fruits and vegetables - Eating a diet with lots of fruits and vegetables can help prevent heart disease and strokes. It might also help prevent certain types of cancers. Try to eat fruits and vegetables at each meal and also for snacks. If you don't have fresh fruits and vegetables available, you can eat frozen or canned ones instead. Doctors recommend eating at least 2 1/2 servings of vegetables and 2 servings of fruits each day.  Foods with fiber - Eating foods with a lot of fiber can help prevent heart disease and strokes. If you have type 2 diabetes, it can also help control your blood sugar. Foods that have a lot of fiber include vegetables, fruits, beans, nuts, oatmeal, and whole grain breads and cereals. You can tell how much fiber is in a food by reading the nutrition label (figure 1). Doctors recommend eating 25 to 36 grams of fiber each day.  Foods with folate (also called folic acid) - Folate is a vitamin that is important for pregnant people, since it helps prevent certain birth defects. Anyone who could get pregnant should get at least 400 micrograms of folic acid daily, whether or not they are actively trying to get pregnant. Folate is found in many breakfast cereals, oranges, orange juice, and green leafy vegetables.  Foods with calcium and vitamin D - Babies, children, and adults need calcium and vitamin D to help keep their bones strong. Adults also need calcium and vitamin D to help prevent osteoporosis. Osteoporosis is a condition that causes bones to get "thin" and break more easily than usual. Different foods and drinks have calcium and vitamin D in " "them (figure 2). People who don't get enough calcium and vitamin D in their diet might need to take a supplement.  Foods with protein - Protein helps your muscles stay strong. Healthy foods with a lot of protein include chicken, fish, eggs, beans, nuts, and soy products. Red meat also has a lot of protein, but it also contains fats, which can be unhealthy.  Some experts recommend a "Mediterranean diet." This involves eating a lot of fruits, vegetables, nuts, whole grains, and olive oil. It also includes some fish, poultry, and dairy products, but not a lot of red meat. Eating this way can help your overall health, and might even lower your risk of having a stroke.  What foods should I avoid or limit? -- To eat a healthy diet, there are some things you should avoid or limit. They include:   Fats - There are different types of fats. Some types of fats are better for your body than others.  Trans fats are especially unhealthy. They are found in margarines, many fast foods, and some store-bought baked goods. Trans fats can raise your cholesterol level and your increase your chance of getting heart disease. You should avoid eating foods with these types of fats.  The type of "polyunsaturated" fats found in fish seems to be healthy and can reduce your chance of getting heart disease. Other polyunsaturated fats might also be good for your health. When you cook, it's best to use oils with healthier fats, such as olive oil and canola oil.  Sugar - To have a healthy diet, it's important to limit or avoid sugar, sweets, and refined grains. Refined grains are found in white bread, white rice, most forms of pasta, and most packaged "snack" foods. Whole grains, such as whole-wheat bread and brown rice, have more fiber and are better for your health.  Avoiding sugar-sweetened beverages, like soda and sports drinks, can also help improve your health.  Red meat - Studies have shown that eating a lot of red meat can increase your " "risk of certain health problems, including heart disease and cancer. You should limit the amount of red meat that you eat.  Can I drink alcohol as part of a healthy diet? -- People who drink a small amount of alcohol each day might have a lower chance of getting heart disease. But drinking alcohol can lead to problems. For example, it can raise a person's chances of getting liver disease and certain types of cancers. In women, even 1 drink a day can increase the risk of getting breast cancer.  Most doctors recommend that adult women not have more than 1 drink a day and that adult men not have more than 2 drinks a day. The limits are different because most women's bodies take longer to break down alcohol.  How many calories do I need each day? -- The number of calories you need each day depends on your weight, height, age, sex, and how active you are.  Your doctor or nurse can tell you how many calories you should eat each day. If you are trying to lose weight, you should eat fewer calories each day.  What if I have questions? -- If you have questions about which foods you should or should not eat, ask your doctor or nurse. The right diet for you will depend, in part, on your health and any medical conditions you have.  All topics are updated as new evidence becomes available and our peer review process is complete.  This topic retrieved from [employer] on: Sep 21, 2021.  Topic 25102 Version 20.0  Release: 29.4.2 - C29.263  © 2021 UpToDate, Inc. and/or its affiliates. All rights reserved.  figure 1: Nutrition label - fiber     This is an example of a nutrition label. To figure out how much fiber is in a food, look for the line that says "Dietary Fiber." It's also important to look at the serving size. This food has 7 grams of fiber in each serving, and each serving is 1 cup.  Graphic 57224 Version 7.0    figure 2: Foods and drinks with calcium and vitamin D     Foods rich in calcium include ice cream, soy milk, breads, " "kale, broccoli, milk, cheese, cottage cheese, almonds, yogurt, ready-to-eat cereals, beans, and tofu. Foods rich in vitamin D include milk, fortified plant-based "milks" (soy, almond), canned tuna fish, cod liver oil, yogurt, ready-to-eat-cereals, cooked salmon, canned sardines, mackerel, and eggs. Some of these foods are rich in both.  Graphic 70872 Version 4.0    Consumer Information Use and Disclaimer   This information is not specific medical advice and does not replace information you receive from your health care provider. This is only a brief summary of general information. It does NOT include all information about conditions, illnesses, injuries, tests, procedures, treatments, therapies, discharge instructions or life-style choices that may apply to you. You must talk with your health care provider for complete information about your health and treatment options. This information should not be used to decide whether or not to accept your health care provider's advice, instructions or recommendations. Only your health care provider has the knowledge and training to provide advice that is right for you. The use of this information is governed by the [employer] End User License Agreement, available at [email or web address].The use of [employer] content is governed by the [employer] Terms of Use. ©2021 UpToDate, Inc. All rights reserved.  Copyright   © 2021 UpToDate, Inc. and/or its affiliates. All rights reserved.    "

## 2024-12-03 NOTE — PROGRESS NOTES
Subjective     Patient ID: Merissa Ochoa is a 54 y.o. female.    Chief Complaint: Healthy You    53 yo WF here for [employer] HY visit.  She is scheduled for mammogram in March.  Is UTD on CRC screening.  Had colonoscopy done in 2021 with repeat due in 2026.  She quit smoking 2 months ago.      Review of Systems   Constitutional:  Negative for activity change, chills and fever.   HENT:  Negative for sore throat.    Eyes:  Negative for pain.   Respiratory:  Negative for cough and chest tightness.    Cardiovascular:  Negative for chest pain and palpitations.   Gastrointestinal:  Negative for abdominal pain.   Neurological:  Negative for dizziness, syncope and weakness.   Psychiatric/Behavioral:  Negative for confusion.        Tobacco Use: Medium Risk (12/3/2024)    Patient History     Smoking Tobacco Use: Former     Smokeless Tobacco Use: Never     Passive Exposure: Never     Review of patient's allergies indicates:   Allergen Reactions    Betadine [povidone-iodine]     Propranolol     Iodinated contrast media Nausea And Vomiting    Lexapro [escitalopram] Nausea And Vomiting     Current Outpatient Medications   Medication Instructions    albuterol (PROAIR HFA) 90 mcg/actuation inhaler 2 puffs, Inhalation, Every 6 hours PRN, Rescue    ALPRAZolam (XANAX) 1 mg, Oral, 3 times daily PRN, Clinic visit needed for additional refills    azithromycin (ZITHROMAX Z-CHARLIE) 250 mg, Oral, Daily, Take 500 mg initially then 250 mg daily.    dexAMETHasone (DECADRON) 0.75 MG Tab 2 tablets PO in AM and 1 tablet PO in PM    dexAMETHasone (DECADRON) 0.75 MG Tab 1 tablet PO BID    famotidine (PEPCID) 20 mg, Oral, 2 times daily    FLUoxetine 40 mg, Oral, Daily    fluticasone propionate (FLONASE) 100 mcg, Each Nostril, Daily    hydroCHLOROthiazide (HYDRODIURIL) 12.5 mg, Oral, Daily, For blood pressure    ondansetron (ZOFRAN-ODT) 4 mg, Oral, Every 8 hours PRN    ondansetron (ZOFRAN-ODT) 4 mg, Oral, Every 8 hours PRN    promethazine (PHENERGAN) 25 mg,  "Oral, Every 6 hours PRN     Medications Discontinued During This Encounter   Medication Reason    ALPRAZolam (XANAX) 1 MG tablet Reorder    hydroCHLOROthiazide (HYDRODIURIL) 12.5 MG Tab Reorder       Past Medical History:   Diagnosis Date    Anxiety attack     COVID-19 virus infection 08/27/2021    Diverticula, colon 06/25/2021    Family history of colonic polyps 06/25/2021    Polyp of descending colon 06/25/2021    Polyp of descending colon 06/25/2021    Polyp of transverse colon 06/25/2021    Polyp of transverse colon 06/25/2021    Positive colorectal cancer screening using Cologuard test 06/25/2021    Sinusitis 09/12/2023     Health Maintenance Topics with due status: Not Due       Topic Last Completion Date    Colorectal Cancer Screening 06/25/2021    Lipid Panel 03/11/2022    Hemoglobin A1c (Diabetic Prevention Screening) 04/19/2024    RSV Vaccine (Age 60+ and Pregnant patients) Not Due       There is no immunization history on file for this patient.    Objective     Body mass index is 26.89 kg/m².  Wt Readings from Last 3 Encounters:   12/03/24 68.9 kg (151 lb 12.8 oz)   10/16/24 69.4 kg (153 lb)   10/04/24 69.4 kg (153 lb)     Ht Readings from Last 3 Encounters:   12/03/24 5' 3" (1.6 m)   10/16/24 5' 3" (1.6 m)   10/04/24 5' 3" (1.6 m)     BP Readings from Last 3 Encounters:   12/03/24 126/87   10/04/24 134/88   10/02/24 (!) 154/96     Temp Readings from Last 3 Encounters:   10/04/24 97.9 °F (36.6 °C) (Oral)   05/06/24 98.1 °F (36.7 °C) (Oral)   04/30/24 98.3 °F (36.8 °C) (Oral)     Pulse Readings from Last 3 Encounters:   12/03/24 66   10/04/24 (!) 56   10/02/24 63     Resp Readings from Last 3 Encounters:   10/04/24 17   05/06/24 16   04/30/24 16     PF Readings from Last 3 Encounters:   No data found for PF       Physical Exam  Vitals reviewed.   Constitutional:       Appearance: Normal appearance.   HENT:      Head: Normocephalic and atraumatic.   Eyes:      Extraocular Movements: Extraocular movements " intact.      Conjunctiva/sclera: Conjunctivae normal.      Pupils: Pupils are equal, round, and reactive to light.   Cardiovascular:      Rate and Rhythm: Normal rate and regular rhythm.      Heart sounds: Normal heart sounds.   Pulmonary:      Effort: Pulmonary effort is normal.      Breath sounds: Normal breath sounds.   Musculoskeletal:         General: Normal range of motion.      Cervical back: Normal range of motion.   Skin:     General: Skin is warm and dry.   Neurological:      General: No focal deficit present.      Mental Status: She is alert and oriented to person, place, and time.   Psychiatric:         Mood and Affect: Mood normal.         Behavior: Behavior normal.       Assessment and Plan     Problem List Items Addressed This Visit          Psychiatric    EAMON (generalized anxiety disorder) (Chronic)    Relevant Medications    ALPRAZolam (XANAX) 1 MG tablet     Other Visit Diagnoses       Screening for lipoid disorders    -  Primary    Relevant Orders    Lipid Panel    Screening for diabetes mellitus        Relevant Orders    Glucose, Random    Elevated blood pressure reading        Relevant Medications    hydroCHLOROthiazide (HYDRODIURIL) 12.5 MG Tab    Routine general medical examination at a health care facility                Plan: eat a well balanced, healthy diet low in sodium, saturated/trans fats, refined carbs and processed foods.  Get regular exercise at least 30 minutes 4-5 x week.  Maintain a healthy weight.  Stay up to date with immunizations and preventive screenings.  Take all medications as prescribed and attend regular follow up visits.  Get annual wellness exam.          I have reviewed the medications, allergies, and problem list.     Goal Actions:    What type of visit is the patient here for today?: Healthy You  Does the patient consent to enroll in Color Me Healthy?: Yes  Is this a Wellness Follow Up?: No  What is your overall wellness goal? (select at least one): Improve overall  health  Choose 3: Lifestyle, Nutrition, Exercise  Lifestyle Actions : Develop good support system  Nurtrition Actions: Eat a well-balanced diet  Exercise Actions: Recommend physical activity 30 minutes per day 3-5 times/week

## 2025-01-24 ENCOUNTER — HOSPITAL ENCOUNTER (EMERGENCY)
Facility: HOSPITAL | Age: 55
Discharge: HOME | End: 2025-01-24
Payer: COMMERCIAL

## 2025-01-24 VITALS
OXYGEN SATURATION: 97 % | SYSTOLIC BLOOD PRESSURE: 109 MMHG | TEMPERATURE: 98 F | RESPIRATION RATE: 17 BRPM | HEART RATE: 78 BPM | HEIGHT: 63 IN | BODY MASS INDEX: 28.35 KG/M2 | WEIGHT: 160 LBS | DIASTOLIC BLOOD PRESSURE: 79 MMHG

## 2025-01-24 DIAGNOSIS — M54.31 SCIATICA OF RIGHT SIDE: Primary | ICD-10-CM

## 2025-01-24 PROCEDURE — 99284 EMERGENCY DEPT VISIT MOD MDM: CPT | Mod: 25

## 2025-01-24 PROCEDURE — 63600175 PHARM REV CODE 636 W HCPCS: Mod: JZ,TB

## 2025-01-24 PROCEDURE — 96372 THER/PROPH/DIAG INJ SC/IM: CPT

## 2025-01-24 RX ORDER — KETOROLAC TROMETHAMINE 30 MG/ML
30 INJECTION, SOLUTION INTRAMUSCULAR; INTRAVENOUS
Status: COMPLETED | OUTPATIENT
Start: 2025-01-24 | End: 2025-01-24

## 2025-01-24 RX ORDER — ORPHENADRINE CITRATE 30 MG/ML
60 INJECTION INTRAMUSCULAR; INTRAVENOUS
Status: COMPLETED | OUTPATIENT
Start: 2025-01-24 | End: 2025-01-24

## 2025-01-24 RX ADMIN — ORPHENADRINE CITRATE 60 MG: 60 INJECTION INTRAMUSCULAR; INTRAVENOUS at 01:01

## 2025-01-24 RX ADMIN — KETOROLAC TROMETHAMINE 30 MG: 30 INJECTION, SOLUTION INTRAMUSCULAR at 01:01

## 2025-01-24 NOTE — DISCHARGE INSTRUCTIONS
Follow-up with your primary care provider. Return to Emergency Department for any new or worsening symptoms.

## 2025-01-24 NOTE — ED PROVIDER NOTES
Encounter Date: 1/24/2025       History     Chief Complaint   Patient presents with    Back Pain     Pt reports sciatic nerve pain in the right side of her back and leg.      54-year-old WF presents to emergency department with complaints of sciatica nerve pain.  Symptoms are chronic.  Pain is on the right side.  She reports chronic back pain.  Denies any new injury or trauma.  States symptoms feel the same as her previous flares with her sciatica.    The history is provided by the patient.     Review of patient's allergies indicates:   Allergen Reactions    Betadine [povidone-iodine]     Propranolol     Iodinated contrast media Nausea And Vomiting    Lexapro [escitalopram] Nausea And Vomiting     Past Medical History:   Diagnosis Date    Anxiety attack     COVID-19 virus infection 08/27/2021    Diverticula, colon 06/25/2021    Family history of colonic polyps 06/25/2021    Polyp of descending colon 06/25/2021    Polyp of descending colon 06/25/2021    Polyp of transverse colon 06/25/2021    Polyp of transverse colon 06/25/2021    Positive colorectal cancer screening using Cologuard test 06/25/2021    Sinusitis 09/12/2023     Past Surgical History:   Procedure Laterality Date    CHOLECYSTECTOMY      HYSTERECTOMY      TUBAL LIGATION       Family History   Problem Relation Name Age of Onset    Diabetes Mellitus Other Positive Family Hx     Heart disease Other Positive Family Hx     Hypertension Other Positive Family Hx     Mental illness Other Positive Family Hx     Ovarian cancer Other Positive Family Hx      Social History     Tobacco Use    Smoking status: Former     Current packs/day: 1.00     Average packs/day: 1 pack/day for 36.1 years (36.1 ttl pk-yrs)     Types: Cigarettes     Start date: 1989     Passive exposure: Never    Smokeless tobacco: Never   Substance Use Topics    Alcohol use: Not Currently    Drug use: Yes     Types: Marijuana     Comment: medical card     Review of Systems   Constitutional:   Negative for chills and fever.   Respiratory:  Negative for shortness of breath.    Cardiovascular:  Negative for chest pain.   Gastrointestinal:  Negative for abdominal pain.   Musculoskeletal:  Positive for back pain. Negative for gait problem.       Physical Exam     Initial Vitals   BP Pulse Resp Temp SpO2   01/24/25 1331 01/24/25 1328 01/24/25 1328 01/24/25 1328 01/24/25 1328   109/79 78 17 97.9 °F (36.6 °C) 97 %      MAP       --                Physical Exam    Constitutional: She appears well-developed and well-nourished. No distress.   HENT:   Head: Normocephalic.   Eyes: Pupils are equal, round, and reactive to light.   Neck:   Normal range of motion.  Cardiovascular:  Normal rate, regular rhythm and normal heart sounds.           Pulmonary/Chest: Breath sounds normal. No respiratory distress.   Abdominal: Abdomen is soft. Bowel sounds are normal. There is no abdominal tenderness.   Musculoskeletal:         General: No tenderness or edema. Normal range of motion.      Cervical back: Normal range of motion.     Neurological: She is alert and oriented to person, place, and time.   Skin: Skin is warm and dry.         Medical Screening Exam   See Full Note    ED Course   Procedures  Labs Reviewed - No data to display       Imaging Results    None          Medications   ketorolac injection 30 mg (30 mg Intramuscular Given 1/24/25 1346)   orphenadrine injection 60 mg (60 mg Intramuscular Given 1/24/25 1346)     Medical Decision Making  53 yo WF presents with acute flare of chronic right-sided sciatica pain.  No trauma or injury.  Treated in the ED with IM Toradol and Norflex  Follow up with PCP    Risk  Prescription drug management.                                      Clinical Impression:   Final diagnoses:  [M54.31] Sciatica of right side (Primary)        ED Disposition Condition    Discharge Stable          ED Prescriptions    None       Follow-up Information       Follow up With Specialties Details Why  Contact Info    Heath Doshi MD Family Medicine On 1/27/2025  [address] [place] [id number]  [phone]               Sinai Toro, ROSALIE  01/24/25 1438

## 2025-03-19 ENCOUNTER — HOSPITAL ENCOUNTER (OUTPATIENT)
Dept: RADIOLOGY | Facility: HOSPITAL | Age: 55
Discharge: HOME | End: 2025-03-19
Attending: FAMILY MEDICINE
Payer: COMMERCIAL

## 2025-03-19 DIAGNOSIS — Z12.39 ENCOUNTER FOR SCREENING FOR MALIGNANT NEOPLASM OF BREAST, UNSPECIFIED SCREENING MODALITY: ICD-10-CM

## 2025-03-19 PROCEDURE — 77067 SCR MAMMO BI INCL CAD: CPT | Mod: TC

## 2025-03-19 PROCEDURE — 77067 SCR MAMMO BI INCL CAD: CPT | Mod: 26,,, | Performed by: RADIOLOGY

## 2025-03-19 PROCEDURE — 77063 BREAST TOMOSYNTHESIS BI: CPT | Mod: 26,,, | Performed by: RADIOLOGY

## 2025-03-28 DIAGNOSIS — F41.1 GAD (GENERALIZED ANXIETY DISORDER): Chronic | ICD-10-CM

## 2025-03-28 RX ORDER — ALPRAZOLAM 1 MG/1
1 TABLET ORAL 3 TIMES DAILY PRN
Qty: 90 TABLET | Refills: 0 | Status: SHIPPED | OUTPATIENT
Start: 2025-03-28

## 2025-05-01 DIAGNOSIS — R03.0 ELEVATED BLOOD PRESSURE READING: ICD-10-CM

## 2025-05-01 DIAGNOSIS — F41.1 GAD (GENERALIZED ANXIETY DISORDER): Chronic | ICD-10-CM

## 2025-05-01 RX ORDER — HYDROCHLOROTHIAZIDE 12.5 MG/1
12.5 TABLET ORAL DAILY
Qty: 30 TABLET | Refills: 2 | Status: SHIPPED | OUTPATIENT
Start: 2025-05-01

## 2025-05-01 RX ORDER — ALPRAZOLAM 1 MG/1
1 TABLET ORAL 3 TIMES DAILY PRN
Qty: 90 TABLET | Refills: 0 | Status: SHIPPED | OUTPATIENT
Start: 2025-05-01

## 2025-05-30 ENCOUNTER — HOSPITAL ENCOUNTER (EMERGENCY)
Facility: HOSPITAL | Age: 55
Discharge: HOME | End: 2025-05-30
Payer: COMMERCIAL

## 2025-05-30 VITALS
WEIGHT: 150 LBS | RESPIRATION RATE: 18 BRPM | OXYGEN SATURATION: 96 % | BODY MASS INDEX: 26.58 KG/M2 | HEART RATE: 73 BPM | TEMPERATURE: 98 F | DIASTOLIC BLOOD PRESSURE: 89 MMHG | SYSTOLIC BLOOD PRESSURE: 133 MMHG | HEIGHT: 63 IN

## 2025-05-30 DIAGNOSIS — J00 COMMON COLD VIRUS: Primary | ICD-10-CM

## 2025-05-30 LAB
AMPHET UR QL SCN: NEGATIVE
BACTERIA #/AREA URNS HPF: ABNORMAL /HPF
BARBITURATES UR QL SCN: NEGATIVE
BENZODIAZ METAB UR QL SCN: POSITIVE
BILIRUB UR QL STRIP: NEGATIVE
CANNABINOIDS UR QL SCN: NEGATIVE
CLARITY UR: ABNORMAL
COCAINE UR QL SCN: NEGATIVE
COLOR UR: YELLOW
GLUCOSE UR STRIP-MCNC: NEGATIVE MG/DL
KETONES UR STRIP-SCNC: NEGATIVE MG/DL
LEUKOCYTE ESTERASE UR QL STRIP: ABNORMAL
NITRITE UR QL STRIP: NEGATIVE
OPIATES UR QL SCN: NEGATIVE
PCP UR QL SCN: NEGATIVE
PH UR STRIP: 6 PH UNITS
PROT UR QL STRIP: NEGATIVE
RBC # UR STRIP: ABNORMAL /UL
RBC #/AREA URNS HPF: ABNORMAL /HPF
SARS-COV-2 RDRP RESP QL NAA+PROBE: NEGATIVE
SP GR UR STRIP: 1.01
SQUAMOUS #/AREA URNS LPF: ABNORMAL /LPF
UROBILINOGEN UR STRIP-ACNC: 0.2 MG/DL
WBC #/AREA URNS HPF: ABNORMAL /HPF

## 2025-05-30 PROCEDURE — 81003 URINALYSIS AUTO W/O SCOPE: CPT | Performed by: NURSE PRACTITIONER

## 2025-05-30 PROCEDURE — 80307 DRUG TEST PRSMV CHEM ANLYZR: CPT | Performed by: NURSE PRACTITIONER

## 2025-05-30 PROCEDURE — 99284 EMERGENCY DEPT VISIT MOD MDM: CPT | Mod: ,,, | Performed by: NURSE PRACTITIONER

## 2025-05-30 PROCEDURE — 87635 SARS-COV-2 COVID-19 AMP PRB: CPT | Performed by: NURSE PRACTITIONER

## 2025-05-30 PROCEDURE — 99282 EMERGENCY DEPT VISIT SF MDM: CPT

## 2025-06-05 ENCOUNTER — OFFICE VISIT (OUTPATIENT)
Dept: FAMILY MEDICINE | Facility: CLINIC | Age: 55
End: 2025-06-05
Payer: COMMERCIAL

## 2025-06-05 VITALS
WEIGHT: 153 LBS | BODY MASS INDEX: 27.11 KG/M2 | SYSTOLIC BLOOD PRESSURE: 135 MMHG | DIASTOLIC BLOOD PRESSURE: 80 MMHG | HEART RATE: 75 BPM | HEIGHT: 63 IN

## 2025-06-05 DIAGNOSIS — F41.1 GAD (GENERALIZED ANXIETY DISORDER): Chronic | ICD-10-CM

## 2025-06-05 PROCEDURE — 1159F MED LIST DOCD IN RCRD: CPT | Mod: ,,,

## 2025-06-05 PROCEDURE — 3008F BODY MASS INDEX DOCD: CPT | Mod: ,,,

## 2025-06-05 PROCEDURE — 3075F SYST BP GE 130 - 139MM HG: CPT | Mod: ,,,

## 2025-06-05 PROCEDURE — 99213 OFFICE O/P EST LOW 20 MIN: CPT | Mod: ,,,

## 2025-06-05 PROCEDURE — 3079F DIAST BP 80-89 MM HG: CPT | Mod: ,,,

## 2025-06-05 RX ORDER — ALPRAZOLAM 1 MG/1
1 TABLET ORAL 3 TIMES DAILY PRN
Qty: 90 TABLET | Refills: 0 | Status: SHIPPED | OUTPATIENT
Start: 2025-06-05 | End: 2025-07-03 | Stop reason: SDUPTHER

## 2025-07-03 DIAGNOSIS — F41.1 GAD (GENERALIZED ANXIETY DISORDER): Chronic | ICD-10-CM

## 2025-07-03 RX ORDER — ALPRAZOLAM 1 MG/1
1 TABLET ORAL 3 TIMES DAILY PRN
Qty: 90 TABLET | Refills: 1 | Status: SHIPPED | OUTPATIENT
Start: 2025-07-03

## 2025-07-03 NOTE — PROGRESS NOTES
Subjective     Patient ID: Merissa Ochoa is a 54 y.o. female.    Chief Complaint: Medication Refill and Health Maintenance (Hepatitis C Screening Never done/TETANUS VACCINE Never done/Shingles Vaccine(1 of 2) Never done/Pneumococcal Vaccines (Age 50+)(1 of 1 - PCV) Never done/COVID-19 Vaccine(1 - 2024-25 season) Never done/Pt will address with PCP)    History of Present Illness    CHIEF COMPLAINT:  Patient presents today for medication refills.    RECENT EMERGENCY ROOM VISIT:  She reports an ER visit last week due to experiencing chills despite being hot-natured, difficulty lifting feet, and feeling drugged-like symptoms. She experienced vomiting the following morning. Drug screen only showed presence of Xanax.    ANXIETY AND STRESSORS:  She has a diagnosis of generalized anxiety disorder. She attended therapy once in the past where she was advised to work on coping mechanisms. Current significant stressors include the loss of her father nearly one year ago and continuous caregiving responsibilities, which interfere with personal activities including bathing and using the bathroom.    MEDICATIONS:  She reports good compliance with Xanax, with 7 tablets remaining from prescription filled on May 1st.    SOCIAL HISTORY:  She recently resumed smoking after a previous quit attempt due to increased stress. She plans to use a nicotine patch to aid in cessation.            Review of Systems   Constitutional:  Negative for fatigue and unexpected weight change.   Respiratory:  Negative for cough, chest tightness, shortness of breath and wheezing.    Cardiovascular:  Negative for chest pain, palpitations and leg swelling.   Gastrointestinal:  Negative for abdominal pain, blood in stool, constipation, diarrhea, nausea and vomiting.   Genitourinary:  Negative for dysuria.   Skin:  Negative for color change and rash.   Neurological:  Negative for dizziness, weakness, light-headedness and headaches.   Hematological:  Does not  "bruise/bleed easily.   Psychiatric/Behavioral:  Positive for decreased concentration. The patient is nervous/anxious.          Vital Signs  Pulse: 75  BP: 135/80  Pain Score: 0-No pain  Height and Weight  Height: 5' 3" (160 cm)  Weight: 69.4 kg (153 lb)  BSA (Calculated - sq m): 1.76 sq meters  BMI (Calculated): 27.1  Weight in (lb) to have BMI = 25: 140.8]    Physical Exam  Vitals and nursing note reviewed.   Constitutional:       Appearance: Normal appearance. She is normal weight.   Cardiovascular:      Rate and Rhythm: Normal rate and regular rhythm.      Pulses: Normal pulses.      Heart sounds: Normal heart sounds.   Pulmonary:      Effort: Pulmonary effort is normal.      Breath sounds: Normal breath sounds.   Musculoskeletal:         General: Normal range of motion.      Cervical back: Normal range of motion and neck supple.   Skin:     General: Skin is warm and dry.      Capillary Refill: Capillary refill takes less than 2 seconds.   Neurological:      General: No focal deficit present.      Mental Status: She is alert and oriented to person, place, and time.   Psychiatric:         Mood and Affect: Mood normal.         Behavior: Behavior normal.         Thought Content: Thought content normal.         Judgment: Judgment normal.            Assessment and Plan     1. EAMON (generalized anxiety disorder)  -     ALPRAZolam (XANAX) 1 MG tablet; Take 1 tablet (1 mg total) by mouth 3 (three) times daily as needed for Anxiety. Clinic visit needed for additional refills  Dispense: 90 tablet; Refill: 0      Assessment & Plan    F41.1 Generalized anxiety disorder  F17.210 Nicotine dependence, cigarettes, uncomplicated  R11.10 Vomiting, unspecified  R68.83 Chills (without fever)  Z63.0 Problems in relationship with spouse or partner    GENERALIZED ANXIETY DISORDER:  - Assessed anxiety symptoms and current medication regimen.  - Determined refill of Xanax appropriate based on reported adherence and ongoing symptoms " "related to generalized anxiety disorder.  - Discussed importance of therapy in managing anxiety and recommended finding a therapist that is a good fit.  - Provided educational materials on benzodiazepine use, emphasizing proper usage and potential risks.    NICOTINE DEPENDENCE:  - Noted the patient has resumed smoking due to stress after previously quitting.  - Discussed smoking cessation strategies and supported the patient's plan to use a nicotine patch to quit smoking again.    VOMITING AND CHILLS:  - Documented the patient experienced emesis the morning after feeling chills and other symptoms that made them feel "as if drugged," leading to an emergency department visit.  - Assessed for dehydration and evaluated for possible underlying causes of these symptoms.    RELATIONSHIP PROBLEMS AND STRESS MANAGEMENT:  - Observed the patient feels stressed due to personal circumstances, including family issues, recent bereavement, and caregiving responsibilities.  - Provided supportive counseling regarding interpersonal challenges and recommended stress management techniques.    FOLLOW-UP:  - Follow up in 4 weeks.        reviewed           Follow up in about 4 weeks (around 7/3/2025).    This note was generated with the assistance of ambient listening technology. Verbal consent was obtained by the patient and accompanying visitor(s) for the recording of patient appointment to facilitate this note. I attest to having reviewed and edited the generated note for accuracy, though some syntax or spelling errors may persist. Please contact the author of this note for any clarification.         I spent a total of 15 minutes on the day of the visit.This includes face to face time and non-face to face time preparing to see the patient (eg, review of tests), obtaining and/or reviewing separately obtained history, documenting clinical information in the electronic or other health record, independently interpreting results and " communicating results to the patient/family/caregiver, or care coordinator.    ROSALIE Monk-C